# Patient Record
Sex: MALE | Race: WHITE | NOT HISPANIC OR LATINO | Employment: FULL TIME | URBAN - METROPOLITAN AREA
[De-identification: names, ages, dates, MRNs, and addresses within clinical notes are randomized per-mention and may not be internally consistent; named-entity substitution may affect disease eponyms.]

---

## 2019-06-18 ENCOUNTER — NURSE TRIAGE (OUTPATIENT)
Dept: FAMILY MEDICINE | Facility: CLINIC | Age: 49
End: 2019-06-18

## 2019-06-18 NOTE — TELEPHONE ENCOUNTER
"Patient is scheduled to see RITO Johns PA-C, on 6/20/19 for \"chest pains.\"    Triage asked to contact patient regarding symptoms.    Patient's last office visit: 3/23/15.    Left message to call back and ask to speak with an available triage nurse.  LENNOX BernalN, RN            "

## 2019-07-05 NOTE — PROGRESS NOTES
"Subjective     Fito Frye is a 48 year old male who presents to clinic today for the following health issues:    HPI   Chest Pain      Onset: x 2 months    Description (location/character/radiation/duration): right upper chest, intermittent pains last 1 minute, squeezing feeling    Accompanying signs and symptoms:        Shortness of breath: YES - with sense of fatigue but SOB does not occur with CP episodes       Sweating: no        Nausea/vomitting: no        Palpitations: no        Other (fevers/chills/cough/heartburn/lightheadedness): YES- fatigue    History (similar episodes/previous evaluation): Patient states he had a similar pain years ago related to overuse of right arm at work (no longer has this job) so he thinks it could be muscular    Precipitating or alleviating factors:       Worse with exertion: no        Worse with breathing: no        Related to eating: no        Better with burping: no     Therapies tried and outcome: None      He describes this as a \"gripping\" pain   Often occurs at rest - often when lying in bed  Occurs many times per day - up to a dozen times  He is very active; he exercises a lot and 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  He swims across [facility].  Use of his upper extremities does not exacerbate his pain.  No neck pain.  Occasional blurry vision.    Denies dizziness but states he feels very tired - almost to the point of dizzines  No syncope.  He notes a lot of stress at home.  His 15-year-old son has been having behavior issues.  Answers for HPI/ROS submitted by the patient on 7/11/2019   If you checked off any problems, how difficult have these problems made it for you to do your work, take care of things at home, or get along with other people?: Somewhat difficult  PHQ9 TOTAL SCORE: 4  HORTENSIA 7 TOTAL SCORE: 0    He notes that his blood pressure has been slightly elevated the last few times he's had it taken.    He had " normal stress echo in 2007.    CV Risks:   + FHX, male gender, elevated BP  Unknown lipid status  Nonsmoker (never smoker)    BP Readings from Last 3 Encounters:   07/11/19 (!) 141/96   04/24/15 134/87   03/27/15 137/81    Wt Readings from Last 3 Encounters:   07/11/19 100.7 kg (222 lb)   04/24/15 95.8 kg (211 lb 3.2 oz)   03/27/15 93.2 kg (205 lb 6.4 oz)               Reviewed and updated as needed this visit by Provider  Meds  Problems         Review of Systems   ROS COMP: Constitutional, HEENT, cardiovascular, pulmonary, GI, , musculoskeletal, neuro, skin, endocrine and psych systems are negative, except as otherwise noted.      Objective    BP (!) 141/96 (BP Location: Right arm, Patient Position: Sitting, Cuff Size: Adult Large)   Pulse 51   Temp 98.3  F (36.8  C) (Oral)   Resp 12   Wt 100.7 kg (222 lb)   SpO2 99%   BMI 26.85 kg/m    Body mass index is 26.85 kg/m .  Physical Exam   GEN:  no apparent distress   EYES: sclerae and conjunctivae clear with no discharge  NECK:  Supple without adenopathy, mass, or thyromegaly  LUNGS:  normal respiratory effort, and lungs clear to auscultation bilaterally - no rales, rhonchi or wheezes  CV: regular rate and rhythm, normal S1 S2, no S3 or S4 and no murmur, click or rub  ABD:  soft, nontender, no mass, no hepatosplenomegaly, no hernias   SHOULDER:  ROM of right shoulder is full and painless.        Diagnostic Test Results:  EKG: appears normal, NSR with normal axis, incomplete RBBB, LVH, no acute ST/T changes c/w ischemia by my interpretation     Results for orders placed or performed in visit on 07/11/19 (from the past 24 hour(s))   CBC with platelets differential   Result Value Ref Range    WBC 4.1 4.0 - 11.0 10e9/L    RBC Count 5.10 4.4 - 5.9 10e12/L    Hemoglobin 15.4 13.3 - 17.7 g/dL    Hematocrit 46.4 40.0 - 53.0 %    MCV 91 78 - 100 fl    MCH 30.2 26.5 - 33.0 pg    MCHC 33.2 31.5 - 36.5 g/dL    RDW 13.0 10.0 - 15.0 %    Platelet Count 250 150 - 450 10e9/L     % Neutrophils 48.8 %    % Lymphocytes 30.5 %    % Monocytes 11.7 %    % Eosinophils 8.3 %    % Basophils 0.7 %    Absolute Neutrophil 2.0 1.6 - 8.3 10e9/L    Absolute Lymphocytes 1.3 0.8 - 5.3 10e9/L    Absolute Monocytes 0.5 0.0 - 1.3 10e9/L    Absolute Eosinophils 0.3 0.0 - 0.7 10e9/L    Absolute Basophils 0.0 0.0 - 0.2 10e9/L    Diff Method Automated Method            Assessment & Plan     1. Fatigue, unspecified type  2. Right-sided chest pain  Unclear etiology/diagnosis with uncertain prognosis requiring further workup.  Ddx considered includes: coronary disease, valve disease (aortic stenosis or insufficiency), cardiomyopathy, pericarditis, pulmonary hypertension, aortic dissection, PE, pneumonia, pleuritis, ptx, lung tumor, GERD, esophageal spasm or rupture, PUD, Inna-Mendez tear, pancreatitis, biliary disease, costochondritis, thoracic radiculopathy, zoster, and anxiety. Also considered anemia, sleep disorder, mood disorder, endocrine disease (thyroid, diabetes), liver disease, kidney disease.   His chest pain is very atypical in terms of location, nonexertional pattern, and brief duration.  MSK etiology does seem somewhat likely given the extent of his upper extremity use (lifting carts at work, swimming, etc) although he reports no shoulder pain. Stress/anxiety may also be contributing.  His family history of CV disease is worrisome - I recommended lipid testing today for further risk stratification and he is fasting.    - Lipid panel reflex to direct LDL Fasting  - Comprehensive metabolic panel  - CBC with platelets differential  - TSH with free T4 reflex  - EKG 12-lead complete w/read - Clinics        3. Elevated blood pressure reading without diagnosis of hypertension  Given his report of elevated BP readings outside of clinic and the LVH on EKG he likely has HTN.  Discussed relevant anatomy, pathophysiology, and sequelae of this condition.  Discussed the importance of controlling blood pressure to  prevent chronic damage to arteries and subsequent arterial disease including risk of heart attack and stroke.  Gave patient education handout on hypertension and dietary recommendations for BP control.  He does not want to start medication at this time; rather he'd prefer to work on diet.     I also recommended formal eye exam.      Patient Instructions     Let us know if your chest pains worsen or become associated with exertion or other symptoms (like shortness of breath, dizziness, sweats, nausea).    For any chest pain that lasts more than 5-10 minutes go to the ER.        Return in about 2 months (around 9/11/2019) for follow-up elevated blood pressure and chest pain.    Mona Ervin MD  [facility]

## 2019-07-11 ENCOUNTER — OFFICE VISIT (OUTPATIENT)
Dept: FAMILY MEDICINE | Facility: CLINIC | Age: 49
End: 2019-07-11

## 2019-07-11 VITALS
DIASTOLIC BLOOD PRESSURE: 96 MMHG | HEART RATE: 51 BPM | WEIGHT: 222 LBS | SYSTOLIC BLOOD PRESSURE: 141 MMHG | OXYGEN SATURATION: 99 % | BODY MASS INDEX: 26.85 KG/M2 | RESPIRATION RATE: 12 BRPM | TEMPERATURE: 98.3 F

## 2019-07-11 DIAGNOSIS — R03.0 ELEVATED BLOOD PRESSURE READING WITHOUT DIAGNOSIS OF HYPERTENSION: ICD-10-CM

## 2019-07-11 DIAGNOSIS — R53.83 FATIGUE, UNSPECIFIED TYPE: Primary | ICD-10-CM

## 2019-07-11 DIAGNOSIS — R07.9 RIGHT-SIDED CHEST PAIN: ICD-10-CM

## 2019-07-11 LAB
ALBUMIN SERPL-MCNC: 4 G/DL (ref 3.4–5)
ALP SERPL-CCNC: 75 U/L (ref 40–150)
ALT SERPL W P-5'-P-CCNC: 27 U/L (ref 0–70)
ANION GAP SERPL CALCULATED.3IONS-SCNC: 5 MMOL/L (ref 3–14)
AST SERPL W P-5'-P-CCNC: 16 U/L (ref 0–45)
BASOPHILS # BLD AUTO: 0 10E9/L (ref 0–0.2)
BASOPHILS NFR BLD AUTO: 0.7 %
BILIRUB SERPL-MCNC: 0.8 MG/DL (ref 0.2–1.3)
BUN SERPL-MCNC: 17 MG/DL (ref 7–30)
CALCIUM SERPL-MCNC: 8.9 MG/DL (ref 8.5–10.1)
CHLORIDE SERPL-SCNC: 107 MMOL/L (ref 94–109)
CHOLEST SERPL-MCNC: 209 MG/DL
CO2 SERPL-SCNC: 28 MMOL/L (ref 20–32)
CREAT SERPL-MCNC: 0.89 MG/DL (ref 0.66–1.25)
DIFFERENTIAL METHOD BLD: NORMAL
EOSINOPHIL # BLD AUTO: 0.3 10E9/L (ref 0–0.7)
EOSINOPHIL NFR BLD AUTO: 8.3 %
ERYTHROCYTE [DISTWIDTH] IN BLOOD BY AUTOMATED COUNT: 13 % (ref 10–15)
GFR SERPL CREATININE-BSD FRML MDRD: >90 ML/MIN/{1.73_M2}
GLUCOSE SERPL-MCNC: 90 MG/DL (ref 70–99)
HCT VFR BLD AUTO: 46.4 % (ref 40–53)
HDLC SERPL-MCNC: 64 MG/DL
HGB BLD-MCNC: 15.4 G/DL (ref 13.3–17.7)
LDLC SERPL CALC-MCNC: 128 MG/DL
LYMPHOCYTES # BLD AUTO: 1.3 10E9/L (ref 0.8–5.3)
LYMPHOCYTES NFR BLD AUTO: 30.5 %
MCH RBC QN AUTO: 30.2 PG (ref 26.5–33)
MCHC RBC AUTO-ENTMCNC: 33.2 G/DL (ref 31.5–36.5)
MCV RBC AUTO: 91 FL (ref 78–100)
MONOCYTES # BLD AUTO: 0.5 10E9/L (ref 0–1.3)
MONOCYTES NFR BLD AUTO: 11.7 %
NEUTROPHILS # BLD AUTO: 2 10E9/L (ref 1.6–8.3)
NEUTROPHILS NFR BLD AUTO: 48.8 %
NONHDLC SERPL-MCNC: 145 MG/DL
PLATELET # BLD AUTO: 250 10E9/L (ref 150–450)
POTASSIUM SERPL-SCNC: 4.9 MMOL/L (ref 3.4–5.3)
PROT SERPL-MCNC: 7.3 G/DL (ref 6.8–8.8)
RBC # BLD AUTO: 5.1 10E12/L (ref 4.4–5.9)
SODIUM SERPL-SCNC: 140 MMOL/L (ref 133–144)
TRIGL SERPL-MCNC: 97 MG/DL
TSH SERPL DL<=0.005 MIU/L-ACNC: 1.9 MU/L (ref 0.4–4)
WBC # BLD AUTO: 4.1 10E9/L (ref 4–11)

## 2019-07-11 PROCEDURE — 93000 ELECTROCARDIOGRAM COMPLETE: CPT | Performed by: FAMILY MEDICINE

## 2019-07-11 PROCEDURE — 80053 COMPREHEN METABOLIC PANEL: CPT | Performed by: FAMILY MEDICINE

## 2019-07-11 PROCEDURE — 99214 OFFICE O/P EST MOD 30 MIN: CPT | Performed by: FAMILY MEDICINE

## 2019-07-11 PROCEDURE — 85025 COMPLETE CBC W/AUTO DIFF WBC: CPT | Performed by: FAMILY MEDICINE

## 2019-07-11 PROCEDURE — 84443 ASSAY THYROID STIM HORMONE: CPT | Performed by: FAMILY MEDICINE

## 2019-07-11 PROCEDURE — 80061 LIPID PANEL: CPT | Performed by: FAMILY MEDICINE

## 2019-07-11 PROCEDURE — 36415 COLL VENOUS BLD VENIPUNCTURE: CPT | Performed by: FAMILY MEDICINE

## 2019-07-11 ASSESSMENT — ANXIETY QUESTIONNAIRES
5. BEING SO RESTLESS THAT IT IS HARD TO SIT STILL: NOT AT ALL
7. FEELING AFRAID AS IF SOMETHING AWFUL MIGHT HAPPEN: NOT AT ALL
6. BECOMING EASILY ANNOYED OR IRRITABLE: NOT AT ALL
GAD7 TOTAL SCORE: 0
4. TROUBLE RELAXING: NOT AT ALL
GAD7 TOTAL SCORE: 0
1. FEELING NERVOUS, ANXIOUS, OR ON EDGE: NOT AT ALL
7. FEELING AFRAID AS IF SOMETHING AWFUL MIGHT HAPPEN: NOT AT ALL
3. WORRYING TOO MUCH ABOUT DIFFERENT THINGS: NOT AT ALL
2. NOT BEING ABLE TO STOP OR CONTROL WORRYING: NOT AT ALL
GAD7 TOTAL SCORE: 0

## 2019-07-11 ASSESSMENT — PATIENT HEALTH QUESTIONNAIRE - PHQ9
SUM OF ALL RESPONSES TO PHQ QUESTIONS 1-9: 4
SUM OF ALL RESPONSES TO PHQ QUESTIONS 1-9: 4
10. IF YOU CHECKED OFF ANY PROBLEMS, HOW DIFFICULT HAVE THESE PROBLEMS MADE IT FOR YOU TO DO YOUR WORK, TAKE CARE OF THINGS AT HOME, OR GET ALONG WITH OTHER PEOPLE: SOMEWHAT DIFFICULT

## 2019-07-11 NOTE — PATIENT INSTRUCTIONS
Let us know if your chest pains worsen or become associated with exertion or other symptoms (like shortness of breath, dizziness, sweats, nausea).    For any chest pain that lasts more than 5-10 minutes go to the ER.      Patient Education     What is High Blood Pressure?  High blood pressure (also called hypertension) is known as the  silent killer.  This is because most of the time it doesn t cause symptoms. In fact, many people don t know they have it until other problems develop. In most cases, high blood pressure often requires lifelong treatment.  Understanding blood pressure  The circulatory system is made up of the heart and blood vessels that carry blood through the body. Your heart is the pump for this system. With each heartbeat (contraction), the heart sends blood out through large blood vessels called arteries. Blood pressure is a measure of how hard the moving blood pushes against the walls of the arteries.  High blood pressure can harm your health  In a healthy blood vessel, the blood moves smoothly through the vessel and puts normal pressure on the vessel walls.       High blood pressure occurs when blood pushes too hard against artery walls. This causes damage to the artery walls and then the formation of scar tissue as it heals. This makes the arteries stiff and weak. Plaque sticks to the scarred tissue narrowing and hardening the arteries. High blood pressure also causes your heart to work harder to get blood out to the body. High blood pressure raises your risk of heart attack, also known as acute myocardial infarction, or AMI, heart failure, and stroke. It can also lead to kidney disease, and blindness. In general, if you have high blood pressure, keeping your blood pressure below 130/80 mmHg may help prevent these problems. Your healthcare provider may prescribe medicine to help control blood pressure if lifestyle changes are not enough.  It's important to know your blood pressure numbers.  Blood pressure measurements are given as 2 numbers. Systolic blood pressure is the upper number. This is the pressure when the heart contracts. Diastolic blood pressure is the lower number. This is the pressure when the heart relaxes between beats.  Blood pressure is categorized as normal, elevated, or stage 1 or stage 2 high blood pressure:    Normal blood pressure is systolic of less than 120 and diastolic of less than 80 (120/80)    Elevated blood pressure is systolic of 120 to 129 and diastolic less than 80    Stage 1 high blood pressure is systolic is 130 to 139 or diastolic between 80 to 89    Stage 2 high blood pressure is when systolic is 140 or higher or the diastolic is 90 or higher  High blood pressure is diagnosed when multiple, separate readings show blood pressure above 130/80 mmHg. Talk with your healthcare provider if you have questions or concerns about your blood pressure readings.  Measuring blood pressure  An example of a blood pressure measurement is 120/70 (120 over 70). The top number is the pressure of blood against the artery walls during a heartbeat (systolic). The bottom number is the pressure of blood against artery walls between heartbeats (diastolic). Talk with your healthcare provider to find out what your blood pressure goals should be.   Controlling blood pressure  If your blood pressure is too high, work with your doctor on a plan for lowering it. Below are steps you can take that will help lower your blood pressure.    Choose heart-healthy foods. Eating healthier meals helps you control your blood pressure. Ask your doctor about the DASH eating plan. This plan helps reduce blood pressure by limiting the amount of sodium (salt) you have in your diet. DASH also encourages eating plenty of fruits and vegetables, low-fat or non-fat dairy, whole-grains, and foods high in fiber, and low in fat. This also provides an enhanced amount of potassium which can also help lower blood  "pressure.    Reduce sodium. Reducing sodium in your diet reduces fluid retention. Fluid retention caused by too much salt increases blood volume and blood pressure. The American Heart Association s (AHA) \"ideal\" sodium intake recommendation is 1,500 milligrams per day.  However, since American's eat so much salt, the AHA says a positive change can occur by cutting back to even 2,400 milligrams of sodium a day.    Maintain a healthy weight. Being overweight makes you more likely to have high blood pressure. Losing excess weight helps lower blood pressure.    Exercise regularly. Daily exercise helps your heart and blood vessels work better and stay healthier. It can help lower your blood pressure.    Stop smoking. Smoking increases blood pressure and damages blood vessels.    Limit alcohol. Drinking too much alcohol can raise blood pressure. Men should have no more than 2 drinks a day. Women should have no more than 1. (A drink is equal to 1 beer, or a small glass of wine, or a shot of liquor.)    Control stress. Stress makes your heart work harder and beat faster. Controlling stress helps you control your blood pressure.  Facts about high blood pressure    Feeling OK does not mean that blood pressure is under control. Likewise, feeling bad doesn t mean it s out of control. The only way to know for sure is to check your pressure regularly.    Medicine is only one part of controlling high blood pressure. You also need to manage your weight, get regular exercise, and adjust your eating habits.    High blood pressure is usually a lifelong problem. But it can be controlled with healthy lifestyle changes and medicine.    Hypertension is not the same as stress. Although stress may be a factor in high blood pressure, it s only one part of the story.    Blood pressure medicines need to be taken every day. Stopping suddenly may cause a dangerous increase in pressure.   Date Last Reviewed: 4/27/2016 2000-2018 [employer] " [employer]. [address] [place] [id number]. All rights reserved. This information is not intended as a substitute for professional medical care. Always follow your healthcare professional's instructions.           Patient Education     Eating Heart-Healthy Food: Using the DASH Plan    Eating for your heart doesn t have to be hard or boring. You just need to know how to make healthier choices. The DASH eating plan has been developed to help you do just that. DASH stands for Dietary Approaches to Stop Hypertension. It is a plan that has been proven to be healthier for your heart and to lower your risk for high blood pressure. It can also help lower your risk for cancer, heart disease, osteoporosis, and diabetes.  Choosing from each food group  Choose foods from each of the food groups below each day. Try to get the recommended number of servings for each food group. The serving numbers are based on a diet of 2,000 calories a day. Talk to your doctor if you re unsure about your calorie needs. Along with getting the correct servings, the DASH plan also recommends a sodium intake less than 2,300 mg per day.        Grains  Servings: 6 to 8 a day  A serving is:    1 slice bread    1 ounce dry cereal    Half a cup cooked rice, pasta or cereal  Best choices: Whole grains and any grains high in fiber. Vegetables  Servings: 4 to 5 a day  A serving is:    1 cup raw leafy vegetable    Half a cup cut-up raw or cooked vegetable    Half a cup vegetable juice  Best choices: Fresh or frozen vegetables prepared without added salt or fat.   Fruits  Servings: 4 to 5 a day  A serving is:    1 medium fruit    One-quarter cup dried fruit    Half a cup fresh, frozen, or canned fruit    Half a cup of 100% fruit juices  Best choices: A variety of fresh fruits of different colors. Whole fruits are a better choice than fruit juices. Low-fat or fat-free dairy  Servings: 2 to 3 a day  A serving is:    1 cup milk    1 cup  yogurt    One and a half ounces cheese  Best choices: Skim or 1% milk, low-fat or fat-free yogurt or buttermilk, and low-fat cheeses.         Lean meats, poultry, fish  Servings: 6 or fewer a day  A serving is:    1 ounce cooked meats, poultry, or fish    1 egg  Best choices: Lean poultry and fish. Trim away visible fat. Broil, grill, roast, or boil instead of frying. Remove skin from poultry before eating. Limit how much red meat you eat.  Nuts, seeds, beans  Servings: 4 to 5 a week  A serving is:    One-third cup nuts (one and a half ounces)    2 tablespoons nut butter or seeds    Half a cup cooked dry beans or legumes  Best choices: Dry roasted nuts with no salt added, lentils, kidney beans, garbanzo beans, and whole spann beans.   Fats and oils  Servings: 2 to 3 a day  A serving is:    1 teaspoon vegetable oil    1 teaspoon soft margarine    1 tablespoon mayonnaise    2 tablespoons salad dressing  Best choices: Nut and vegetable oils (nontropical vegetable oils), such as olive and canola oil. Sweets  Servings: 5 a week or fewer  A serving is:    1 tablespoon sugar, maple syrup, or honey    1 tablespoon jam or jelly    1 half-ounce jelly beans (about 15)    1 cup lemonade  Best choices: Dried fruit can be a satisfying sweet. Choose low-fat sweets. And watch your serving sizes!      For more on the DASH eating plan, visit:  www.nhlbi.nih.gov/health/health-topics/topics/dash   Date Last Reviewed: 6/1/2016 2000-2018 [employer]. [address] [place] [address], [place] [address]. All rights reserved. This information is not intended as a substitute for professional medical care. Always follow your healthcare professional's instructions.

## 2019-07-12 ASSESSMENT — PATIENT HEALTH QUESTIONNAIRE - PHQ9: SUM OF ALL RESPONSES TO PHQ QUESTIONS 1-9: 4

## 2019-07-12 ASSESSMENT — ANXIETY QUESTIONNAIRES: GAD7 TOTAL SCORE: 0

## 2019-07-12 NOTE — RESULT ENCOUNTER NOTE
Luis Manuel Payton,  Your lipid panel (cholesterol) results show mild elevation of your total cholesterol and LDL (bad) cholesterol.  Your complete blood counts, TSH (thyroid function test) and comprehensive metabolic panel results (liver function, kidney function, blood sugar, and blood salts) are all normal.      As you may know, an elevated cholesterol is one factor that increases your risk for heart disease and stroke.  You can improve your cholesterol by controlling the amount and type of fat you eat.    Here are some ways to improve your nutrition:      *   Eat less fat (such as butter) and no trans fats (such as margarine and Crisco).  Use polyunsaturated fats (such as olive oil) instead.      *   Buy lean cuts of meat and replace red meat with fish and seafood.      *   Eat more fruits and vegetables.      *   Eat a handful of tree nuts (such as almonds, walnuts, or cashews) every day.       *   Avoid fried or fast foods that are high in fat      *   Replace processed starches with whole grains (such as brown rice or whole grain breads, pastas, cereals) and legumes.    Please schedule a follow-up in 2 months and let us know sooner if your fatigue or chest pain episodes are worsening.    Mona Ervin MD

## 2020-03-30 ENCOUNTER — TELEPHONE (OUTPATIENT)
Dept: FAMILY MEDICINE | Facility: CLINIC | Age: 50
End: 2020-03-30

## 2020-03-30 ENCOUNTER — VIRTUAL VISIT (OUTPATIENT)
Dept: FAMILY MEDICINE | Facility: CLINIC | Age: 50
End: 2020-03-30

## 2020-03-30 DIAGNOSIS — R51.9 NONINTRACTABLE EPISODIC HEADACHE, UNSPECIFIED HEADACHE TYPE: Primary | ICD-10-CM

## 2020-03-30 PROCEDURE — 99213 OFFICE O/P EST LOW 20 MIN: CPT | Mod: TEL | Performed by: FAMILY MEDICINE

## 2020-03-30 NOTE — TELEPHONE ENCOUNTER
Triage -- please call pt to find out if he is having an acute severe headache or acute vision changes. If so, he needs to go directly do the ER and should not wait for a telephone visit with me at 2pm. I see that his last bp was elevated. It is possible that he is having hypertensive urgency or stroke. He is not on any anti-hypertensive medication.I also see that he had headaches with photophobia and dizziness/lightheadedness in 2013 and was sent for an MRI of the brain, which was normal at that time. I am listed as his PCP, but have not seen him since 2015. Mona Szymanski M.D.

## 2020-03-30 NOTE — TELEPHONE ENCOUNTER
Patient thinks that it may be sinus issues.  Has HA in the back of the head.  States that vision is not really changing.  Not having any issues with talking or movements.  Patient feels he is OK to wait for phone visit.  Shagufta had called to make appointment and patient is at work.  Not together when appointment was made.  Maria Rg RN

## 2020-03-30 NOTE — PROGRESS NOTES
"Subjective     Fito Frye is a 49 year old male who is being evaluated via a billable telephone visit.      The patient has been notified of following:     \"This telephone visit will be conducted via a call between you and your physician/provider. We have found that certain health care needs can be provided without the need for a physical exam.  This service lets us provide the care you need with a short phone conversation.  If a prescription is necessary we can send it directly to your pharmacy.  If lab work is needed we can place an order for that and you can then stop by our lab to have the test done at a later time.    If during the course of the call the physician/provider feels a telephone visit is not appropriate, you will not be charged for this service.\"     Physician has received verbal consent for a Telephone Visit from the patient? Yes    Fito Frye complains of   Chief Complaint   Patient presents with     Headache     Derm Problem           ALLERGIES  No known drug allergies and Seasonal allergies    Headache  Onset: 4 days    Description:   Location: left back side of head   Character: pressure feeling, sharp pain  Frequency: Every day for the past 4 days   Duration:  Off and on throughout the day and when looking up     Intensity: moderate    Progression of Symptoms:  same    Accompanying Signs & Symptoms:  Stiff neck: no  Neck or upper back pain: YES  Fever: no  Sinus pressure: YES  Nausea or vomiting: no  Dizziness: no  Numbness: no  Weakness: no  Visual changes: YES see below    History:   Head trauma: no  Family history of migraines: no  Previous tests for headaches: no  Neurologist evaluations: no  Able to do daily activities: YES  Wake with a headaches: YES notices upon awakening  Do headaches wake you up: no  Daily pain medication use: YES  Work/school stressors/changes: no    Precipitating factors:   Does light make it worse: sometimes  Does sound make it worse: " "no    Alleviating factors:  Does sleep help: no    Therapies Tried and outcome: Ibuprofen (Advil, Motrin) and Tylenol- does not help     He has had a headache for 4 days and wonders if it sounds worrisome.  He was sore on Friday, in general, but also his head.   Saturday woke up with headache at the back of his head.   Sunday same thing and throughout the day  Feels the pain on the skull itself on one particular spot.   Hurts more when he moves, moving his head up or down gets a sharp shooting pain.   He does not have any neck pain really.   For awhile felt like a lot of pressure. Pretty much stayed the same. Dulls a little after he takes OTC pain medication. Does not go away.   Has not had a HA like this before.   Thinks he might have two things. Face also some pressure yesterday. Has had sinus headaches in the past. Wonders if this could be a variation of the \"sinus headache' he sometimes gets.   He has a little bit of a tender spot in the fleshy part of the back of his head. No swelling.   He was not doing anything in particular when it started. Noticed it upon awakening. It does not awaken him during sleep.   If he stays motionless, has a sensation of pressure but no pain.   If he turns his head to the right or up or down has shooting pain.   Regarding his vision, he says that it has been bothering him for a bit and does not really seem related. Not blurry. Maybe a little light sensitiviuty. Has had worse when he has had allergy headaches in the past.   No fever or cough. Otherwise feeling well.        He was last seen in July by Dr. Ervin for the complaints of fatigue and chest pain.  He did have an elevated blood pressure at that time.  He had a normal EKG and his labs, aside from mild elevation of total cholesterol and LDL, were normal.  He had declined medication at that time for his blood pressure.    He is very active, exercising regularly and has a physical job.  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  He swims across [facility].  At that time, he did also complain of occasional blurry vision.  He also complained of a sensation of feeling very tired, almost to the point of dizziness.  He had reported that he had a lot of stress at home with a 15-year-old son having behavioral issues.  He was recommended to have a formal eye exam given his complaint of blurry vision.  It was also recommended that he follow-up in September for his elevated blood pressure and complaint of chest pain. He has not been seen in clinic since July.        Patient Active Problem List   Diagnosis     CARDIOVASCULAR SCREENING; LDL GOAL LESS THAN 160     Porokeratosis plantaris discreta     Pain in joint involving ankle and foot     Past Surgical History:   Procedure Laterality Date     [facility] ECHO HEART XTHORACIC, STRESS/REST  7/2007    Negative       Social History     Tobacco Use     Smoking status: Never Smoker     Smokeless tobacco: Never Used   Substance Use Topics     Alcohol use: Yes     Comment: 6 drinks weekly     Family History   Problem Relation Age of Onset     Myocardial Infarction Mother 70     Coronary Artery Disease Father      Diabetes Type 2  Father          Current Outpatient Medications   Medication Sig Dispense Refill     IBUPROFEN PO Take 200 mg by mouth       Psyllium (METAMUCIL PO) Take 17 g by mouth daily       VITAMIN D OR 1 tablet daily       Allergies   Allergen Reactions     No Known Drug Allergies      Seasonal Allergies      Recent Labs   Lab Test 07/11/19  1005 04/14/14  1418 04/25/13  1756   *  --   --    HDL 64  --   --    TRIG 97  --   --    ALT 27 22 31   CR 0.89 0.78 0.82   GFRESTIMATED >90 >90 >90   GFRESTBLACK >90 >90 >90   POTASSIUM 4.9 3.6 4.2   TSH 1.90  --  2.84      BP Readings from Last 3 Encounters:   07/11/19 (!) 141/96   04/24/15 134/87   03/27/15 137/81    Wt Readings from Last 3 Encounters:   07/11/19 100.7 kg (222 lb)   04/24/15 95.8 kg (211 lb 3.2  oz)   03/27/15 93.2 kg (205 lb 6.4 oz)                    Reviewed and updated as needed this visit by Provider         Review of Systems   ROS COMP: Constitutional, HEENT, cardiovascular, pulmonary, gi and gu systems are negative, except as otherwise noted.       Objective   Reported vitals:  There were no vitals taken for this visit.      Psych: Alert and oriented times 3; coherent speech, normal   rate and volume, able to articulate logical thoughts, able   to abstract reason, no tangential thoughts, no hallucinations   or delusions      Diagnostic Test Results:  Labs reviewed in Epic         Assessment/Plan:  1. Nonintractable episodic headache, unspecified headache type  Likely tension type headache.  No red flag symptoms. I recommended applying hot or cold packs to the area depending on what she feels better.  I recommended stretching the neck and massage of the area with a foam roller.  He can continue to use Tylenol and/or ibuprofen as needed for discomfort.  For now, will monitor symptoms.  If any additional symptoms arise or if the headache becomes more severe he will let us know right away.  He prefers to avoid any clinic visits at this time.          Phone call duration:  18 minutes    Mona Szymanski MD

## 2020-07-14 ENCOUNTER — VIRTUAL VISIT (OUTPATIENT)
Dept: FAMILY MEDICINE | Facility: CLINIC | Age: 50
End: 2020-07-14

## 2020-07-14 DIAGNOSIS — R53.83 FATIGUE, UNSPECIFIED TYPE: ICD-10-CM

## 2020-07-14 DIAGNOSIS — M79.10 MUSCLE ACHE: ICD-10-CM

## 2020-07-14 DIAGNOSIS — R51.9 NONINTRACTABLE EPISODIC HEADACHE, UNSPECIFIED HEADACHE TYPE: Primary | ICD-10-CM

## 2020-07-14 DIAGNOSIS — R40.0 DAYTIME SOMNOLENCE: ICD-10-CM

## 2020-07-14 PROCEDURE — 99214 OFFICE O/P EST MOD 30 MIN: CPT | Mod: 95 | Performed by: FAMILY MEDICINE

## 2020-07-14 RX ORDER — SUMATRIPTAN 25 MG/1
25 TABLET, FILM COATED ORAL
COMMUNITY
End: 2020-07-30

## 2020-07-14 RX ORDER — METHYLPREDNISOLONE 4 MG/1
4 TABLET ORAL DAILY
COMMUNITY
End: 2020-07-30

## 2020-07-14 NOTE — PATIENT INSTRUCTIONS
"  Patient Education     Self-Care for Headaches    Most headaches aren't serious and can be relieved with self-care. But some headaches may be a sign of another health problem like eye trouble or high blood pressure. To find the best treatment, learn what kind of headaches you get. For tension headaches, self-care will usually help. To treat migraines, ask your healthcare provider for advice. It is also possible to get both tension and migraine headaches. Self-care involves relieving the pain and avoiding headache  triggers  if you can.  Ways to reduce pain and tension  Try these steps:    Apply a cold compress or ice pack to the pain site.    Drink fluids. If nausea makes it hard to drink, try sucking on ice.    Rest. Protect yourself from bright light and loud noises.    Calm your emotions by imagining a peaceful scene.    Massage tight neck, shoulder, and head muscles.    To relax muscles, soak in a hot bath or use a hot shower.  Use medicines  Aspirin or other over-the-counter pain medicines, such as ibuprofen and acetaminophen, can relieve headache. Remember: Never give aspirin to anyone 18 years old or younger because of the risk of developing Reye syndrome. Use pain medicines only when needed. Certain prescription medicines, if taken too often, can lead to rebound headaches. Check with your healthcare provider or pharmacist about your medicines.  Track your headaches  Keeping a headache diary can help you and your healthcare provider identify what's causing your headaches:    Note when each headache happens.    Identify your activities and the foods you've eaten 6 to 8 hours before the headache began.    Look for any trends or \"triggers.\"  Signs of tension headache  Any of the following can be signs:    Dull pain or feeling of pressure in a tight band around your head    Pain in your neck or shoulders    Headache without a definite beginning or end    Headache after an activity such as driving or working on a " "computer  Signs of migraine  Any of the following can be signs:    Throbbing pain on one or both sides of your head    Nausea or vomiting    Extreme sensitivity to light, sound, and smells    Bright spots, flashes, or other visual changes    Pain or nausea so severe that you can't continue your daily activities  Call your healthcare provider   If you have any of the following symptoms, contact your healthcare provider:    A headache that lingers after a recent injury or bump to the head.    A fever with a stiff neck or pain when you bend your head toward your chest.    A headache along with slurred speech, changes in your vision, or numbness or weakness in your arms or legs.    A headache for longer than 3 days.    Frequent headaches, especially in the morning.    Headaches with seizures     Seek immediate medical attention if you have a headache that you would call \"the worst headache you have ever had.\"  Date Last Reviewed: 3/1/2018    4405-5889 [employer]. [address] [place] [address], [place] [address]. All rights reserved. This information is not intended as a substitute for professional medical care. Always follow your healthcare professional's instructions.           "

## 2020-07-14 NOTE — PROGRESS NOTES
"Fito Frye is a 49 year old male who is being evaluated via a billable telephone visit.      The patient has been notified of following:     \"This telephone visit will be conducted via a call between you and your physician/provider. We have found that certain health care needs can be provided without the need for a physical exam.  This service lets us provide the care you need with a short phone conversation.  If a prescription is necessary we can send it directly to your pharmacy.  If lab work is needed we can place an order for that and you can then stop by our lab to have the test done at a later time.    Telephone visits are billed at different rates depending on your insurance coverage. During this emergency period, for some insurers they may be billed the same as an in-person visit.  Please reach out to your insurance provider with any questions.    If during the course of the call the physician/provider feels a telephone visit is not appropriate, you will not be charged for this service.\"    Patient has given verbal consent for Telephone visit?  Yes    What phone number would you like to be contacted at? [phone]    How would you like to obtain your AVS? [facility]    Subjective     Fito Frye is a 49 year old male who presents via phone visit today for the following health issues:    HPI     He continues to have headaches, though they have become less frequent.  The symptoms are about the same he [facility], but did not feel they were effective.     He says that in March he was having headaches and fatigue. He worried he had a brain tumor or COVID. He started trying to do massage and taking ibuprofen and it got a little better. The pain got better, but did not go away. [facility]. Was having headaches, fatigue, muscle aches. He was given migraine medicine and it helped a little bit, but not a big difference. He has had a sense of soreness " "and fatigue. Headache pain is still present. Feels sore and exhausted and wanted to get a COVID test. Has never had a fever or a cough. Sometimes feels dizzy, exhausted, muscle soreness. Takes ibuprofen and tylenol for symptoms.     He was in clinic last year with fatigue too. With a sense of feeling very tired, almost to the point of dizziness.     He stopped biking this fall because he was just too tired. Denies any shortness of breath.     His sleep is not very good. His wife has complained for years that he is restless through the night. He feels sore and fatigued when he wakes up in the morning. Has coffee and moves around and feels better, then in the middle of the day feels achy and tired again. Does not feel rested when he awakens. Does not really have difficulty falling asleep. Does feel sore and takes tylenol pm to help sleep.     Sometimes has some gas and mild stomach pain. Regular bowel movements. Eats well. Eats a lot of veggies except at work.     Muscle aches are all over his body. No areas of swelling or erythema. On and off through the day. Comes in waves. Does have joint pains sometimes, but that does not seem out of the ordinary to him.     Does note he has had periods of depression in the past. Never felt he needed to take medication. Stress at home with son who is 16 and had some behavioral trouble and legal issues. Does not feel depressed.     He notices he feels colder more often. Sometimes will feel suddenly hot.     Sometimes notices shaky hands, loses his  sometimes. Sometimes feels difficulty balancing.         From 3/30/20 virtual visit:   \"Headache  Onset: 4 days    Description:   Location: left back side of head   Character: pressure feeling, sharp pain  Frequency: Every day for the past 4 days   Duration:  Off and on throughout the day and when looking up     Intensity: moderate    Progression of Symptoms:  same    Accompanying Signs & Symptoms:  Stiff neck: no  Neck or upper back " "pain: YES  Fever: no  Sinus pressure: YES  Nausea or vomiting: no  Dizziness: no  Numbness: no  Weakness: no  Visual changes: YES see below    History:   Head trauma: no  Family history of migraines: no  Previous tests for headaches: no  Neurologist evaluations: no  Able to do daily activities: YES  Wake with a headaches: YES notices upon awakening  Do headaches wake you up: no  Daily pain medication use: YES  Work/school stressors/changes: no    Precipitating factors:   Does light make it worse: sometimes  Does sound make it worse: no    Alleviating factors:  Does sleep help: no     Therapies Tried and outcome: Ibuprofen (Advil, Motrin) and Tylenol- does not help      He has had a headache for 4 days and wonders if it sounds worrisome.  He was sore on Friday, in general, but also his head.   Saturday woke up with headache at the back of his head.   Sunday same thing and throughout the day  Feels the pain on the skull itself on one particular spot.   Hurts more when he moves, moving his head up or down gets a sharp shooting pain.   He does not have any neck pain really.   For awhile felt like a lot of pressure. Pretty much stayed the same. Dulls a little after he takes OTC pain medication. Does not go away.   Has not had a HA like this before.   Thinks he might have two things. Face also some pressure yesterday. Has had sinus headaches in the past. Wonders if this could be a variation of the \"sinus headache' he sometimes gets.   He has a little bit of a tender spot in the fleshy part of the back of his head. No swelling.   He was not doing anything in particular when it started. Noticed it upon awakening. It does not awaken him during sleep.   If he stays motionless, has a sensation of pressure but no pain.   If he turns his head to the right or up or down has shooting pain.   Regarding his vision, he says that it has been bothering him for a bit and does not really seem related. Not blurry. Maybe a little light " "sensitiviuty. Has had worse when he has had allergy headaches in the past.   No fever or cough. Otherwise feeling well.         He was last seen in July by Dr. Ervin for the complaints of fatigue and chest pain.  He did have an elevated blood pressure at that time.  He had a normal EKG and his labs, aside from mild elevation of total cholesterol and LDL, were normal.  He had declined medication at that time for his blood pressure.     He is very active, exercising regularly and has a physical job.  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  He swims across [facility].  At that time, he did also complain of occasional blurry vision.  He also complained of a sensation of feeling very tired, almost to the point of dizziness.  He had reported that he had a lot of stress at home with a 15-year-old son having behavioral issues.  He was recommended to have a formal eye exam given his complaint of blurry vision.  It was also recommended that he follow-up in September for his elevated blood pressure and complaint of chest pain. He has not been seen in clinic since July.       \"    Patient Active Problem List   Diagnosis     CARDIOVASCULAR SCREENING; LDL GOAL LESS THAN 160     Porokeratosis plantaris discreta     Pain in joint involving ankle and foot     Past Surgical History:   Procedure Laterality Date      ECHO HEART XTHORACIC, STRESS/REST  7/2007    Negative       Social History     Tobacco Use     Smoking status: Never Smoker     Smokeless tobacco: Never Used   Substance Use Topics     Alcohol use: Yes     Comment: 6 drinks weekly     Family History   Problem Relation Age of Onset     Myocardial Infarction Mother 70     Coronary Artery Disease Father      Diabetes Type 2  Father          Current Outpatient Medications   Medication Sig Dispense Refill     IBUPROFEN PO Take 200 mg by mouth       methylPREDNISolone (MEDROL) 4 MG tablet Take 4 mg by mouth daily       SUMAtriptan (IMITREX) 25 " MG tablet Take 25 mg by mouth at onset of headache for migraine       Acetaminophen (TYLENOL PO)        Psyllium (METAMUCIL PO) Take 17 g by mouth daily       VITAMIN D OR 1 tablet daily       Allergies   Allergen Reactions     No Known Drug Allergies      Seasonal Allergies      Recent Labs   Lab Test 07/11/19  1005 04/14/14  1418 04/25/13  1756   *  --   --    HDL 64  --   --    TRIG 97  --   --    ALT 27 22 31   CR 0.89 0.78 0.82   GFRESTIMATED >90 >90 >90   GFRESTBLACK >90 >90 >90   POTASSIUM 4.9 3.6 4.2   TSH 1.90  --  2.84      BP Readings from Last 3 Encounters:   07/11/19 (!) 141/96   04/24/15 134/87   03/27/15 137/81    Wt Readings from Last 3 Encounters:   07/11/19 100.7 kg (222 lb)   04/24/15 95.8 kg (211 lb 3.2 oz)   03/27/15 93.2 kg (205 lb 6.4 oz)                    Reviewed and updated as needed this visit by Provider         Review of Systems   Constitutional, HEENT, cardiovascular, pulmonary, gi and gu systems are negative, except as otherwise noted.       Objective   Reported vitals:  There were no vitals taken for this visit.   healthy, alert and no distress  PSYCH: Alert and oriented times 3; coherent speech, normal   rate and volume, able to articulate logical thoughts, able   to abstract reason, no tangential thoughts, no hallucinations   or delusions  His affect is normal  RESP: No cough, no audible wheezing, able to talk in full sentences  Remainder of exam unable to be completed due to telephone visits    Diagnostic Test Results:  Labs reviewed in Epic        Assessment/Plan:  1. Nonintractable episodic headache, unspecified headache type    Also notes some difficulty with balance at times and shakiness  Headaches persistent for months now.   - NEUROLOGY ADULT REFERRAL    2. Daytime somnolence  3. Fatigue, unspecified type  4. Muscle ache   symptoms for the past year. Does not feel he gets restful sleep. Wife notices he moves a lot during the night. He will schedule with sleep  consultant.   Evaluated for fatigue last year and had normal TSH, CBC, CMP, EKG. Symptoms have remained mostly unchanged except for onset of headaches in the spring.   - SLEEP EVALUATION & MANAGEMENT REFERRAL - ADULT -[facility] [place] [phone] (Age 15 and up); Future            No follow-ups on file.      Phone call duration:  25 minutes    Mona Szymanski MD

## 2020-07-23 ENCOUNTER — MYC MEDICAL ADVICE (OUTPATIENT)
Dept: FAMILY MEDICINE | Facility: CLINIC | Age: 50
End: 2020-07-23

## 2020-07-23 DIAGNOSIS — R53.83 FATIGUE: Primary | ICD-10-CM

## 2020-07-27 NOTE — TELEPHONE ENCOUNTER
It is something to consider. Did he have a deer tick exposure? Was he in a tick-infested area prior to symptoms beginning? Did he have a target-like rash?  I would recommend completing his visits with the sleep clinic this week and with neurology and find out their recommendations. I could place an order for a lyme test if he still wishes to do so. Mona Szymanski M.D.

## 2020-07-31 ENCOUNTER — OFFICE VISIT (OUTPATIENT)
Dept: NEUROLOGY | Facility: CLINIC | Age: 50
End: 2020-07-31
Payer: COMMERCIAL

## 2020-07-31 VITALS
TEMPERATURE: 98.6 F | HEART RATE: 73 BPM | SYSTOLIC BLOOD PRESSURE: 110 MMHG | BODY MASS INDEX: 28.74 KG/M2 | HEIGHT: 76 IN | WEIGHT: 236 LBS | DIASTOLIC BLOOD PRESSURE: 74 MMHG | OXYGEN SATURATION: 97 %

## 2020-07-31 DIAGNOSIS — R51.9 HEADACHE DISORDER: Primary | ICD-10-CM

## 2020-07-31 DIAGNOSIS — M79.10 GENERALIZED MUSCLE ACHE: ICD-10-CM

## 2020-07-31 DIAGNOSIS — R53.83 FATIGUE: ICD-10-CM

## 2020-07-31 DIAGNOSIS — R53.83 FATIGUE, UNSPECIFIED TYPE: ICD-10-CM

## 2020-07-31 PROCEDURE — 86617 LYME DISEASE ANTIBODY: CPT | Mod: 90 | Performed by: FAMILY MEDICINE

## 2020-07-31 PROCEDURE — 99000 SPECIMEN HANDLING OFFICE-LAB: CPT | Performed by: FAMILY MEDICINE

## 2020-07-31 PROCEDURE — 36415 COLL VENOUS BLD VENIPUNCTURE: CPT | Performed by: FAMILY MEDICINE

## 2020-07-31 PROCEDURE — 99205 OFFICE O/P NEW HI 60 MIN: CPT | Performed by: PSYCHIATRY & NEUROLOGY

## 2020-07-31 ASSESSMENT — MIFFLIN-ST. JEOR: SCORE: 2026.99

## 2020-07-31 NOTE — PATIENT INSTRUCTIONS
AFTER VISIT SUMMARY (AVS):    At today's visit we thoroughly discussed your symptoms.  I discussed that your localized left occipital headaches might be due to occipital neuralgia.  We decided to try a new pillow and to adjust your positioning in sleep, but if your symptoms persist, please come back for referral for the occipital nerve block.    I do not see any additional neurological conditions to explain your other symptoms and do not think that any neurological investigations are warranted at the present time.  However, if you develop new neurological symptoms, please come back for additional evaluation.    No new medications.    Next follow-up appointment is on as needed basis.    Please do not hesitate to call me with any questions or concerns.    Thanks.

## 2020-07-31 NOTE — LETTER
"    7/31/2020         RE: Fito Frye  [address]  [facility] [address]        Dear Colleague,    Thank you for referring your patient, Fito Frye, to the [facility]. Please see a copy of my visit note below.    INITIAL NEUROLOGY CONSULTATION    DATE OF VISIT: 7/31/2020  CLINIC LOCATION: [facility]  MRN: [id number]  PATIENT NAME: Fito Frye  YOB: 1970    PRIMARY CARE PROVIDER: Mona Szymanski MD.     REASON FOR VISIT:   Chief Complaint   Patient presents with     Headache     fatigue, vision change, light headed     HISTORY OF PRESENT ILLNESS:                                                    Mr. Fito Frye is 49 year old right handed male patient without significant past medical history, who was seen in consultation today requested by Mona Szymanski MD, for headache.    Per patient's report, in March 2020 he developed intermittent headaches, fatigue, dizziness/lightheadedness and diffuse muscle aches.  [facility] \"migraine medicine\" (sumatriptan), which was not helpful.    The patient reports that he feels the worst in the morning when he wakes up describing as he has \"been run over\".  Every muscle in the body hurts, and he feels lightheaded.  After 2 hours feels better.  Around lunch feels bad again, but not as bad as around 4 PM or in the evening.  Feels like physical activity or gardening make his symptoms worse.  Sleeping or laying flat and hot shower are helpful.  No other aggravating alleviating factors.  Tried hot and cold packs without significant help.  Currently takes Tylenol and ibuprofen several times daily and in the middle of the night for headaches and muscle discomfort.    Patient's intermittent left occipital pressure 6-7/10 pain/headache starts in the morning and occurs intermittently throughout the day with occasional brief sharp episodes.  It could be triggered when he " looks up.  Denies any other associated symptoms.  No other aggravating or alleviating factors.  No focal neurological complaints.    No recent labs.  Denies prior history of significant head injuries, seizures, or CNS infections.    Brain MRI with and without contrast from 5/2/2013, performed for a month history of headaches, dizziness, and near syncopal episode with blurred vision and weakness, was normal.    No additional useful information is available in Care Everywhere, which was reviewed.    Review of Systems - the patient endorses multiple chronic complaints on 14-point comprehensive review of systems of the Patient Health History Form, that were reviewed and will be scanned into his electronic medical record at the end of this encounter. His primary care and other medical providers are aware and addressing all of them.  PAST MEDICAL/SURGICAL HISTORY:                                                    I personally reviewed patient's past medical and surgical history with the patient at today's visit.  Patient Active Problem List   Diagnosis     CARDIOVASCULAR SCREENING; LDL GOAL LESS THAN 160     Porokeratosis plantaris discreta     Pain in joint involving ankle and foot     Past Medical History:   Diagnosis Date     NO ACTIVE PROBLEMS      Past Surgical History:   Procedure Laterality Date     HC ECHO HEART XTHORACIC, STRESS/REST  7/2007    Negative     MEDICATIONS:                                                    I personally reviewed patient's medications and allergies with the patient at today's visit.  Acetaminophen (TYLENOL PO),   IBUPROFEN PO, Take 200 mg by mouth  Ibuprofen-diphenhydrAMINE HCl (IBUPROFEN PM) 200-25 MG CAPS, Take 1 capsule by mouth at bedtime as needed, may repeat once  VITAMIN D OR, 1 tablet daily  Psyllium (METAMUCIL PO), Take 17 g by mouth daily    No current facility-administered medications on file prior to visit.     ALLERGIES:                                                   "    Allergies   Allergen Reactions     No Known Drug Allergies      Seasonal Allergies      FAMILY/SOCIAL HISTORY:                                                    Family and social history was reviewed with the patient at today's visit.  No family history of neurological disorders.  Never smoker.  2 beers/alcohol drinks daily.  Denies current recreational drug use.  , lives with his family.  Works full-time.  REVIEW OF SYSTEMS:                                                    Patient has completed a [facility] Patient Health History, including a 14-system review, which was personally reviewed, and pertinent positives are listed in HPI. He denies any additional problems on the further questioning.  EXAM:                                                    VITAL SIGNS:   BP (!) 146/99   Pulse 73   Temp 98.6  F (37  C) (Oral)   Ht 1.93 m (6' 4\")   Wt 107 kg (236 lb)   SpO2 97%   BMI 28.73 kg/m     Repeat vital signs: /74 (BP Location: Left arm, Patient Position: Sitting, Cuff Size: Adult Regular)   Pulse 73   Temp 98.6  F (37  C) (Oral)   Ht 1.93 m (6' 4\")   Wt 107 kg (236 lb)   SpO2 97%   BMI 28.73 kg/m  .  Mini-Cog Assessment:  Mini Cog Assessment  Clock Draw Score: 2 Normal  3 Item Recall: 2 objects recalled  Mini Cog Total Score: 4    General: pt is in NAD, cooperative.  Skin: normal turgor, moist mucous membranes, no lesions/rashes noticed.  HEENT: ATNC, EOMI, PERRL, white sclera, normal conjunctiva, no nystagmus or ptosis. No carotid bruits bilaterally.  Respiratory: lung sounds clear to auscultation bilaterally, no crackles, wheezes, rhonchi. Symmetric lung excursion, no accessory respiratory muscle use.  Cardiovascular: normal S1/S2, no murmurs/rubs/gallops.   Abdomen: Not distended.  : deferred.    Neurological:  Mental: alert, follows commands, Mini Cog Total Score: 4/5 with 2/3 on memory recall, no aphasia or dysarthria. Fund of knowledge is appropriate for " age.  Cranial Nerves:  CN II: visual acuity - able to accurately count fingers with each eye. Visual fields intact, fundi: discs sharp, no papilledema and normal vessels bilaterally.  CN III, IV, VI: EOM intact, pupils equal and reactive.  No Cogan's twitch after 45 seconds upgaze.  CN V: facial sensation nl  CN VII: face symmetric, no facial droop  CN VIII: hearing normal  CN IX: palate elevation symmetric, uvula at midline  CN XI SCM normal, shoulder shrug nl  CN XII: tongue midline  Motor: Strength: 5/5 in all major groups of all extremities. Normal tone. No abnormal movements. No pronator drift b/l.  No drift of outstretched abducted arms after 90 seconds.  Reflexes: Triceps, biceps, brachioradialis, patellar, and achilles reflexes are normal and symmetric. No clonus noted. Toes are down-going b/l.   Sensory: temperature, light touch, pinprick, and vibration intact, except patchy area of reduced sensation on the left foot (chronic, related to history of several fractures and surgery). Romberg: negative.  Coordination: FNF and heel-shin tests intact b/l.  Gait:  Normal, able to tandem, toe, and heel walk.  Tenderness to palpation over the lesser occipital nerve on the left side.  DATA:   LABS/IMAGING/OTHER STUDIES: I reviewed pertinent medical records, including of brain MRI images and Care Everywhere, as detailed in the history of present illness.  ASSESSMENT AND PLAN:      ASSESSMENT: Fito Frye is a 49 year old male patient with listed above past medical history, who presents with left occipital headache and several other nonspecific symptoms.    We had a detailed discussion with the patient regarding his presenting complaints.  The neurological exam today is non-focal.  He has tenderness to palpation over the left lesser occipital nerve that might indicate left occipital neuralgia.  Given his excessive analgesic use, there could be a component of rebound/medication overuse headaches.  We reviewed these  diagnoses, available treatment options, and the plan.  He wants to adjust his position in sleep and try a new pillow that he just bought before proceeding with occipital nerve block, which I think is reasonable.  He will cut down on the use of over-the-counter analgesics.    We reviewed that I do not see any other neurological conditions to explain his ongoing fatigue and muscle aches.  I advised him to return to his primary care provider to continue his evaluation.    Fito to follow up with Primary Care provider regarding elevated blood pressure.     DIAGNOSES:    ICD-10-CM    1. Headache disorder  R51    2. Fatigue, unspecified type  R53.83    3. Generalized muscle ache  M79.10      PLAN: At today's visit we thoroughly discussed patient's symptoms.  His localized left occipital headaches might be due to occipital neuralgia.  We decided to try a new pillow and to adjust his positioning in sleep, but he was advised to come back for referral for the occipital nerve block if symptoms persist.    I do not see any additional neurological conditions to explain his other symptoms and do not think that any neurological investigations are warranted at the present time.  However, if he develops new neurological symptoms, he was asked to come back for additional evaluation.    No new medications.    Next follow-up appointment is on as needed basis.    Total Time:  81 minutes with > 50% spent counseling the patient on stated above assessment and recommendations, including nature of the diagnosis, available treatment options, and proposed plan.  Additional time was used to answer questions regarding patient's symptoms, my recommendations, and the plan.    Humphrey Logan MD  [facility]  [place]  (Chart documentation was completed in part with Dragon voice-recognition software. Even though reviewed, some grammatical, spelling, and word errors may remain.)            Again, thank you for allowing me to  participate in the care of your patient.        Sincerely,        Humphrey Logan MD

## 2020-07-31 NOTE — PROGRESS NOTES
"INITIAL NEUROLOGY CONSULTATION    DATE OF VISIT: 7/31/2020  CLINIC LOCATION: [facility]  MRN: [id number]  PATIENT NAME: Fito Frye  YOB: 1970    PRIMARY CARE PROVIDER: Mona Szymanski MD.     REASON FOR VISIT:   Chief Complaint   Patient presents with     Headache     fatigue, vision change, light headed     HISTORY OF PRESENT ILLNESS:                                                    Mr. Fito Frye is 49 year old right handed male patient without significant past medical history, who was seen in consultation today requested by Mona Szymanski MD, for headache.    Per patient's report, in March 2020 he developed intermittent headaches, fatigue, dizziness/lightheadedness and diffuse muscle aches.  [facility] \"mig[facility] medicine\" (sumatriptan), which was not helpful.    The patient reports that he feels the worst in the morning when he wakes up describing as he has \"been run over\".  Every muscle in the body hurts, and he feels lightheaded.  After 2 hours feels better.  Around lunch feels bad again, but not as bad as around 4 PM or in the evening.  Feels like physical activity or gardening make his symptoms worse.  Sleeping or laying flat and hot shower are helpful.  No other aggravating alleviating factors.  Tried hot and cold packs without significant help.  Currently takes Tylenol and ibuprofen several times daily and in the middle of the night for headaches and muscle discomfort.    Patient's intermittent left occipital pressure 6-7/10 pain/headache starts in the morning and occurs intermittently throughout the day with occasional brief sharp episodes.  It could be triggered when he looks up.  Denies any other associated symptoms.  No other aggravating or alleviating factors.  No focal neurological complaints.    No recent labs.  Denies prior history of significant head injuries, seizures, or CNS infections.    Brain MRI with and " without contrast from 5/2/2013, performed for a month history of headaches, dizziness, and near syncopal episode with blurred vision and weakness, was normal.    No additional useful information is available in Care Everywhere, which was reviewed.    Review of Systems - the patient endorses multiple chronic complaints on 14-point comprehensive review of systems of the Patient Health History Form, that were reviewed and will be scanned into his electronic medical record at the end of this encounter. His primary care and other medical providers are aware and addressing all of them.  PAST MEDICAL/SURGICAL HISTORY:                                                    I personally reviewed patient's past medical and surgical history with the patient at today's visit.  Patient Active Problem List   Diagnosis     CARDIOVASCULAR SCREENING; LDL GOAL LESS THAN 160     Porokeratosis plantaris discreta     Pain in joint involving ankle and foot     Past Medical History:   Diagnosis Date     NO ACTIVE PROBLEMS      Past Surgical History:   Procedure Laterality Date     HC ECHO HEART XTHORACIC, STRESS/REST  7/2007    Negative     MEDICATIONS:                                                    I personally reviewed patient's medications and allergies with the patient at today's visit.  Acetaminophen (TYLENOL PO),   IBUPROFEN PO, Take 200 mg by mouth  Ibuprofen-diphenhydrAMINE HCl (IBUPROFEN PM) 200-25 MG CAPS, Take 1 capsule by mouth at bedtime as needed, may repeat once  VITAMIN D OR, 1 tablet daily  Psyllium (METAMUCIL PO), Take 17 g by mouth daily    No current facility-administered medications on file prior to visit.     ALLERGIES:                                                      Allergies   Allergen Reactions     No Known Drug Allergies      Seasonal Allergies      FAMILY/SOCIAL HISTORY:                                                    Family and social history was reviewed with the patient at today's visit.  No family  "history of neurological disorders.  Never smoker.  2 beers/alcohol drinks daily.  Denies current recreational drug use.  , lives with his family.  Works full-time.  REVIEW OF SYSTEMS:                                                    Patient has completed a [facility] Patient Health History, including a 14-system review, which was personally reviewed, and pertinent positives are listed in HPI. He denies any additional problems on the further questioning.  EXAM:                                                    VITAL SIGNS:   BP (!) 146/99   Pulse 73   Temp 98.6  F (37  C) (Oral)   Ht 1.93 m (6' 4\")   Wt 107 kg (236 lb)   SpO2 97%   BMI 28.73 kg/m     Repeat vital signs: /74 (BP Location: Left arm, Patient Position: Sitting, Cuff Size: Adult Regular)   Pulse 73   Temp 98.6  F (37  C) (Oral)   Ht 1.93 m (6' 4\")   Wt 107 kg (236 lb)   SpO2 97%   BMI 28.73 kg/m  .  Mini-Cog Assessment:  Mini Cog Assessment  Clock Draw Score: 2 Normal  3 Item Recall: 2 objects recalled  Mini Cog Total Score: 4    General: pt is in NAD, cooperative.  Skin: normal turgor, moist mucous membranes, no lesions/rashes noticed.  HEENT: ATNC, EOMI, PERRL, white sclera, normal conjunctiva, no nystagmus or ptosis. No carotid bruits bilaterally.  Respiratory: lung sounds clear to auscultation bilaterally, no crackles, wheezes, rhonchi. Symmetric lung excursion, no accessory respiratory muscle use.  Cardiovascular: normal S1/S2, no murmurs/rubs/gallops.   Abdomen: Not distended.  : deferred.    Neurological:  Mental: alert, follows commands, Mini Cog Total Score: 4/5 with 2/3 on memory recall, no aphasia or dysarthria. Fund of knowledge is appropriate for age.  Cranial Nerves:  CN II: visual acuity - able to accurately count fingers with each eye. Visual fields intact, fundi: discs sharp, no papilledema and normal vessels bilaterally.  CN III, IV, VI: EOM intact, pupils equal and reactive.  No Cogan's twitch " after 45 seconds upgaze.  CN V: facial sensation nl  CN VII: face symmetric, no facial droop  CN VIII: hearing normal  CN IX: palate elevation symmetric, uvula at midline  CN XI SCM normal, shoulder shrug nl  CN XII: tongue midline  Motor: Strength: 5/5 in all major groups of all extremities. Normal tone. No abnormal movements. No pronator drift b/l.  No drift of outstretched abducted arms after 90 seconds.  Reflexes: Triceps, biceps, brachioradialis, patellar, and achilles reflexes are normal and symmetric. No clonus noted. Toes are down-going b/l.   Sensory: temperature, light touch, pinprick, and vibration intact, except patchy area of reduced sensation on the left foot (chronic, related to history of several fractures and surgery). Romberg: negative.  Coordination: FNF and heel-shin tests intact b/l.  Gait:  Normal, able to tandem, toe, and heel walk.  Tenderness to palpation over the lesser occipital nerve on the left side.  DATA:   LABS/IMAGING/OTHER STUDIES: I reviewed pertinent medical records, including of brain MRI images and Care Everywhere, as detailed in the history of present illness.  ASSESSMENT AND PLAN:      ASSESSMENT: Fito Frye is a 49 year old male patient with listed above past medical history, who presents with left occipital headache and several other nonspecific symptoms.    We had a detailed discussion with the patient regarding his presenting complaints.  The neurological exam today is non-focal.  He has tenderness to palpation over the left lesser occipital nerve that might indicate left occipital neuralgia.  Given his excessive analgesic use, there could be a component of rebound/medication overuse headaches.  We reviewed these diagnoses, available treatment options, and the plan.  He wants to adjust his position in sleep and try a new pillow that he just bought before proceeding with occipital nerve block, which I think is reasonable.  He will cut down on the use of  over-the-counter analgesics.    We reviewed that I do not see any other neurological conditions to explain his ongoing fatigue and muscle aches.  I advised him to return to his primary care provider to continue his evaluation.    Fito to follow up with Primary Care provider regarding elevated blood pressure.     DIAGNOSES:    ICD-10-CM    1. Headache disorder  R51    2. Fatigue, unspecified type  R53.83    3. Generalized muscle ache  M79.10      PLAN: At today's visit we thoroughly discussed patient's symptoms.  His localized left occipital headaches might be due to occipital neuralgia.  We decided to try a new pillow and to adjust his positioning in sleep, but he was advised to come back for referral for the occipital nerve block if symptoms persist.    I do not see any additional neurological conditions to explain his other symptoms and do not think that any neurological investigations are warranted at the present time.  However, if he develops new neurological symptoms, he was asked to come back for additional evaluation.    No new medications.    Next follow-up appointment is on as needed basis.    Total Time:  81 minutes with > 50% spent counseling the patient on stated above assessment and recommendations, including nature of the diagnosis, available treatment options, and proposed plan.  Additional time was used to answer questions regarding patient's symptoms, my recommendations, and the plan.    Humphrey Logan MD  [facility]  [place]  (Chart documentation was completed in part with Dragon voice-recognition software. Even though reviewed, some grammatical, spelling, and word errors may remain.)

## 2020-08-02 LAB — B BURGDOR IGG SER QL IB: NEGATIVE

## 2020-08-03 NOTE — RESULT ENCOUNTER NOTE
Excellent! Please call or sent a Un[employer] message if you have any questions. Mona Szymanski M.D.

## 2020-08-06 ENCOUNTER — TELEPHONE (OUTPATIENT)
Dept: PALLIATIVE MEDICINE | Facility: CLINIC | Age: 50
End: 2020-08-06

## 2020-08-06 NOTE — TELEPHONE ENCOUNTER
Pt scheduled 8/10 for Peripheral nerve block: Occipital.    Clary VOSS    [facility]

## 2020-08-10 ENCOUNTER — OFFICE VISIT (OUTPATIENT)
Dept: PALLIATIVE MEDICINE | Facility: CLINIC | Age: 50
End: 2020-08-10
Payer: COMMERCIAL

## 2020-08-10 VITALS — SYSTOLIC BLOOD PRESSURE: 124 MMHG | OXYGEN SATURATION: 98 % | DIASTOLIC BLOOD PRESSURE: 87 MMHG | HEART RATE: 87 BPM

## 2020-08-10 DIAGNOSIS — M54.81 OCCIPITAL NEURALGIA OF LEFT SIDE: Primary | ICD-10-CM

## 2020-08-10 PROCEDURE — 64405 NJX AA&/STRD GR OCPL NRV: CPT | Mod: LT | Performed by: PHYSICAL MEDICINE & REHABILITATION

## 2020-08-10 RX ORDER — BUPIVACAINE HYDROCHLORIDE 5 MG/ML
1 INJECTION, SOLUTION EPIDURAL; INTRACAUDAL ONCE
Status: COMPLETED | OUTPATIENT
Start: 2020-08-10 | End: 2020-08-10

## 2020-08-10 RX ORDER — TRIAMCINOLONE ACETONIDE 40 MG/ML
40 INJECTION, SUSPENSION INTRA-ARTICULAR; INTRAMUSCULAR ONCE
Status: COMPLETED | OUTPATIENT
Start: 2020-08-10 | End: 2020-08-10

## 2020-08-10 RX ADMIN — BUPIVACAINE HYDROCHLORIDE 5 MG: 5 INJECTION, SOLUTION EPIDURAL; INTRACAUDAL at 16:31

## 2020-08-10 RX ADMIN — TRIAMCINOLONE ACETONIDE 40 MG: 40 INJECTION, SUSPENSION INTRA-ARTICULAR; INTRAMUSCULAR at 16:31

## 2020-08-10 ASSESSMENT — PAIN SCALES - GENERAL: PAINLEVEL: EXTREME PAIN (8)

## 2020-08-10 NOTE — PATIENT INSTRUCTIONS
[facility]   Post Procedure Instructions    Today you had:     occipital nerve block         Medications used:  lidocaine   bupivicaine   kenolog   dexamethasone          Go to the emergency room if you develop any shortness of breath    Monitor the injection sites for signs and symptoms of infection-fever, chills, redness, swelling, warmth, or drainage to areas.    You may have soreness at injection sites for up to 24 hours.    You may apply ice to the painful areas to help minimize the discomfort of the needle pokes.    Do not apply heat to sites for at least 12 hours.    You may use anti-inflammatory medications or Tylenol for pain control if necessary    Pain Clinic phone number during work hours Monday-Friday:  [phone]    After hours provider line: [phone]

## 2020-08-10 NOTE — PROGRESS NOTES
Pre procedure Diagnosis: occipital neuralgia   Post procedure Diagnosis: Same  Procedure performed: Left occipital nerve block  Anesthesia: none  Complications: none  Operators: Tala Gillis MD     Indications:   Fito Frye is a 49 year old male with a history of left sided headaches which start in posterior head and radiate anteriorly to the left eye.  Exam shows tenderness over left suboccipital region and they have tried conservative treatment including medications.    Options/alternatives, benefits and risks were discussed with the patient including bleeding, infection, hematoma, nerve damage, and stroke, Questions were answered to his satisfaction and he agrees to proceed. Voluntary informed consent was obtained and signed.     Vitals were reviewed: Yes  Allergies were reviewed:  Yes   Medications were reviewed:  Yes   Pre-procedure pain score: 7/10    Procedure:  After getting informed consent, a Pause for the Cause was performed.    The occipital ridge, occipital protuberance, and mastoid process were palpated on the left side.  The location of maximal tenderness which was consistent with the location of the greater occipital nerve was palpated.  The area was cleaned.    Palpation for a pulse was completed, with no pulse noted at the site of the injection.  A 25G, 1.5 inch needle was introduced, aimed cephalad, in the superficial tissues at this area of tenderness.  The injection was completed at this location, fanning in 3 different directions.  Aspiration for heme was negative before all injections.    In total, 1 ml of 0.5% bupivacaine, 1ml of 1% lidocaine and 40 mg was injected.     The patient tolerated the procedure well, hemostasis was achieved.      Post-procedure pain score: 0/10 in left eye (this is compared to pre-procedure pain of 5/10 in the left eye. He states it is difficult to rate pain in left posterior head)    Follow-up includes:   -f/u with referring provider    MD ANABEL Tripathi  [facility]

## 2020-10-28 ENCOUNTER — NURSE TRIAGE (OUTPATIENT)
Dept: FAMILY MEDICINE | Facility: CLINIC | Age: 50
End: 2020-10-28

## 2020-10-28 NOTE — TELEPHONE ENCOUNTER
See 10/27/20 MyChart encounter.  Patient report of chest pain.    Left message to call back and ask to speak with an available triage nurse.  LENNOX BernalN, RN

## 2020-11-05 ENCOUNTER — OFFICE VISIT (OUTPATIENT)
Dept: NEUROLOGY | Facility: CLINIC | Age: 50
End: 2020-11-05
Attending: FAMILY MEDICINE
Payer: COMMERCIAL

## 2020-11-05 VITALS
WEIGHT: 243 LBS | TEMPERATURE: 97.7 F | HEART RATE: 75 BPM | SYSTOLIC BLOOD PRESSURE: 136 MMHG | OXYGEN SATURATION: 96 % | BODY MASS INDEX: 29.58 KG/M2 | DIASTOLIC BLOOD PRESSURE: 87 MMHG

## 2020-11-05 DIAGNOSIS — M54.2 CERVICALGIA: ICD-10-CM

## 2020-11-05 DIAGNOSIS — R51.9 HEADACHE DISORDER: Primary | ICD-10-CM

## 2020-11-05 PROCEDURE — 99354 PR PROLONGED SERV,OFFICE,1ST HR: CPT | Performed by: PSYCHIATRY & NEUROLOGY

## 2020-11-05 PROCEDURE — 99215 OFFICE O/P EST HI 40 MIN: CPT | Performed by: PSYCHIATRY & NEUROLOGY

## 2020-11-05 RX ORDER — NORTRIPTYLINE HCL 10 MG
10 CAPSULE ORAL AT BEDTIME
Qty: 31 CAPSULE | Refills: 3 | Status: SHIPPED | OUTPATIENT
Start: 2020-11-20 | End: 2021-03-25

## 2020-11-05 NOTE — PROGRESS NOTES
"ESTABLISHED PATIENT NEUROLOGY NOTE    DATE OF VISIT: 11/5/2020  CLINIC LOCATION: [facility]  MRN: [id number]  PATIENT NAME: Fito Frye  YOB: 1970    PCP: Mona Szymanski MD.    REASON FOR VISIT:   Chief Complaint   Patient presents with     Headache     x 1 month     SUBJECTIVE:                                                      HISTORY OF PRESENT ILLNESS: Patient, previously seen once for left occipital neuralgia, presents again on the referral of his primary care provider for headache recurrence and to discuss a new symptom, \"altered head sensations\". Please refer to my initial note from 7/31/2020 for further information.    Since the last visit, the patient reports that after the left occipital block on 8/10/2020 he felt fine for approximately a month.  He was able to sleep well and stopped using over-the-counter analgesics.  Then, the pain returned, but feels different at the present time.  He does not feel significant pressure in the left occipital area with occasional brief sharp episodes.    Instead, he reports intermittent pressure up to 6/10 headache that affects both frontotemporal and bioccipital areas in headband fashion almost daily in the second part of the day, especially when he is physically tired after work.  Additional associated symptoms include \"brain freeze\", dizziness, fatigue, and diffuse muscle tenderness.  Headaches affect his ability to sleep.  He denies any phonophobia, photophobia, nausea, vomiting, or intolerance of physical activity.  No additional aggravating or alleviating factors.  He restarted taking ibuprofen and Tylenol almost daily over the last month.    In addition, the patient reports altered sensations in his head.  It feels to him at times that his jaw is out of alignment and occasionally \"cracks\".  He denies any facial paresthesias or numbness.  He also denies any new focal neurological symptoms.    In 2013, he " experienced very similar symptoms of headaches, dizziness, fatigue, blurry vision, and weakness.  Brain MRI from 5/2/2013 was normal.    On review of systems, patient endorses no other active complaints. Medications, allergies, family and social history were also reviewed. There are no changes reported by patient.  REVIEW OF SYSTEMS:                                                    10-system review was completed. Pertinent positives are included in HPI. The remainder of ROS is negative.  EXAM:                                                    Physical Exam:   Vitals: /87 (BP Location: Right arm)   Pulse 75   Temp 97.7  F (36.5  C) (Oral)   Wt 110.2 kg (243 lb)   SpO2 96%   BMI 29.58 kg/m      General: pt is in NAD, cooperative.  Skin: normal turgor, moist mucous membranes, no lesions/rashes noticed.  HEENT: ATNC, white sclera, normal conjunctiva.  Respiratory: Symmetric lung excursion, no accessory respiratory muscle use.  Abdomen: Non distended.  Neurological: awake, cooperative, follows commands, no aphasia or dysarthria noted, cranial nerves II-XII: no ptosis, face is symmetric, equally moves all extremities, no dysmetria bilaterally, casual gait is normal.  No tenderness to palpation over the greater or lesser occipital nerves bilaterally.  ASSESSMENT AND PLAN:                                                    Assessment: 49-year-old male patient previously seen once for left occipital neuralgia presents with headache recurrence and to discuss recent onset of altered head sensation (mainly his jaw feels out of alignment) on advice from his primary care provider.  His last MRI with and without contrast from May 2013.  It was normal at that time.  His neurological exam today basically unchanged.  There is no tenderness to palpation over the occipital nerves.  There is no need to repeat brain MRI at the present time, but we could consider it in the future if the patient develops new focal  neurological symptoms, or his headaches worsen/become unresponsive to treatment.    I had a prolonged discussion with the patient regarding his presenting complaints, suspected diagnosis, available treatment options, and the plan.  At the present time, his clinical presentation would be most consistent with tension headaches with additional possible components of medication overuse, cervicogenic headaches, and possibly TMJ.  We discussed the role of physical therapy and preventive medications.  I also advised him to try naproxen instead of Tylenol and ibuprofen due to longer duration of action and also limit use to less than 15 days/month to avoid rebound headaches.  The rest of our discussion and the plan are summarized below.    Diagnoses:    ICD-10-CM    1. Headache disorder  R51.9 [facility] PT, HAND, AND CHIROPRACTIC REFERRAL     nortriptyline (PAMELOR) 10 MG capsule   2. Cervicalgia  M54.2 [facility] PT, HAND, AND CHIROPRACTIC REFERRAL     Plan: At today's visit we thoroughly discussed various diagnostic possibilities for patient's symptoms, available treatment options, and the plan, which includes:  Orders Placed This Encounter   Procedures     [facility] PT[facility] HAND, AND CHIR[facility]RACTIC REFERRAL     For headache prevention we decided to try nortriptyline.  I advised him to take 10 mg at bedtime for the next 3 months.  I counseled the patient to contact my clinic if he develops any significant side effects and let me know how he is doing in about 4 to 6 weeks from now via My Chart.  We might further increase nortriptyline dose at that time if no effect, and it is tolerated well.    For acute headache therapy he will try naproxen at 500 mg daily for intolerable headaches.  I counseled the patient to take it with food and limit use to less than 15 days/month.    I advised to keep the headache diary and bring it to the next follow-up visit or upload via My Chart.    Next follow-up appointment is in the next 3 months or earlier if  needed.    Total Time: 71 minutes with > 50% spent counseling the patient on stated above assessment and recommendations.  Additional time was spent on counseling regarding patient's symptoms, suspected diagnosis, treatment options, and the proposed plan.    Humphrey Logan MD  HP/ Neurology

## 2020-11-05 NOTE — LETTER
"    11/5/2020         RE: Fito Frye  [address]  [facility] [address]        Dear Colleague,    Thank you for referring your patient, Fito Frye, to the [facility] [place]. Please see a copy of my visit note below.    ESTABLISHED PATIENT NEUROLOGY NOTE    DATE OF VISIT: 11/5/2020  CLINIC LOCATION: [facility]  MRN: [id number]  PATIENT NAME: Fito Frye  YOB: 1970    PCP: Mona Szymanski MD.    REASON FOR VISIT:   Chief Complaint   Patient presents with     Headache     x 1 month     SUBJECTIVE:                                                      HISTORY OF PRESENT ILLNESS: Patient, previously seen once for left occipital neuralgia, presents again on the referral of his primary care provider for headache recurrence and to discuss a new symptom, \"altered head sensations\". Please refer to my initial note from 7/31/2020 for further information.    Since the last visit, the patient reports that after the left occipital block on 8/10/2020 he felt fine for approximately a month.  He was able to sleep well and stopped using over-the-counter analgesics.  Then, the pain returned, but feels different at the present time.  He does not feel significant pressure in the left occipital area with occasional brief sharp episodes.    Instead, he reports intermittent pressure up to 6/10 headache that affects both frontotemporal and bioccipital areas in headband fashion almost daily in the second part of the day, especially when he is physically tired after work.  Additional associated symptoms include \"brain freeze\", dizziness, fatigue, and diffuse muscle tenderness.  Headaches affect his ability to sleep.  He denies any phonophobia, photophobia, nausea, vomiting, or intolerance of physical activity.  No additional aggravating or alleviating factors.  He restarted taking ibuprofen and Tylenol almost daily over the last month.    In " "addition, the patient reports altered sensations in his head.  It feels to him at times that his jaw is out of alignment and occasionally \"cracks\".  He denies any facial paresthesias or numbness.  He also denies any new focal neurological symptoms.    In 2013, he experienced very similar symptoms of headaches, dizziness, fatigue, blurry vision, and weakness.  Brain MRI from 5/2/2013 was normal.    On review of systems, patient endorses no other active complaints. Medications, allergies, family and social history were also reviewed. There are no changes reported by patient.  REVIEW OF SYSTEMS:                                                    10-system review was completed. Pertinent positives are included in HPI. The remainder of ROS is negative.  EXAM:                                                    Physical Exam:   Vitals: /87 (BP Location: Right arm)   Pulse 75   Temp 97.7  F (36.5  C) (Oral)   Wt 110.2 kg (243 lb)   SpO2 96%   BMI 29.58 kg/m      General: pt is in NAD, cooperative.  Skin: normal turgor, moist mucous membranes, no lesions/rashes noticed.  HEENT: ATNC, white sclera, normal conjunctiva.  Respiratory: Symmetric lung excursion, no accessory respiratory muscle use.  Abdomen: Non distended.  Neurological: awake, cooperative, follows commands, no aphasia or dysarthria noted, cranial nerves II-XII: no ptosis, face is symmetric, equally moves all extremities, no dysmetria bilaterally, casual gait is normal.  No tenderness to palpation over the greater or lesser occipital nerves bilaterally.  ASSESSMENT AND PLAN:                                                    Assessment: 49-year-old male patient previously seen once for left occipital neuralgia presents with headache recurrence and to discuss recent onset of altered head sensation (mainly his jaw feels out of alignment) on advice from his primary care provider.  His last MRI with and without contrast from May 2013.  It was normal at that " time.  His neurological exam today basically unchanged.  There is no tenderness to palpation over the occipital nerves.  There is no need to repeat brain MRI at the present time, but we could consider it in the future if the patient develops new focal neurological symptoms, or his headaches worsen/become unresponsive to treatment.    I had a prolonged discussion with the patient regarding his presenting complaints, suspected diagnosis, available treatment options, and the plan.  At the present time, his clinical presentation would be most consistent with tension headaches with additional possible components of medication overuse, cervicogenic headaches, and possibly TMJ.  We discussed the role of physical therapy and preventive medications.  I also advised him to try naproxen instead of Tylenol and ibuprofen due to longer duration of action and also limit use to less than 15 days/month to avoid rebound headaches.  The rest of our discussion and the plan are summarized below.    Diagnoses:    ICD-10-CM    1. Headache disorder  R51.9 [facility] PT, HAND, AND CHIROPRACTIC REFERRAL     nortriptyline (PAMELOR) 10 MG capsule   2. Cervicalgia  M54.2 [facility] PT[facility] HAND, AND CHIROPRACTIC REFERRAL     Plan: At today's visit we thoroughly discussed various diagnostic possibilities for patient's symptoms, available treatment options, and the plan, which includes:  Orders Placed This Encounter   Procedures     [facility] PT[facility] HAND, AND CHIROPRACTIC REFERRAL     For headache prevention we decided to try nortriptyline.  I advised him to take 10 mg at bedtime for the next 3 months.  I counseled the patient to contact my clinic if he develops any significant side effects and let me know how he is doing in about 4 to 6 weeks from now via My Chart.  We might further increase nortriptyline dose at that time if no effect, and it is tolerated well.    For acute headache therapy he will try naproxen at 500 mg daily for intolerable headaches.  I counseled the  patient to take it with food and limit use to less than 15 days/month.    I advised to keep the headache diary and bring it to the next follow-up visit or upload via My Chart.    Next follow-up appointment is in the next 3 months or earlier if needed.    Total Time: 71 minutes with > 50% spent counseling the patient on stated above assessment and recommendations.  Additional time was spent on counseling regarding patient's symptoms, suspected diagnosis, treatment options, and the proposed plan.    Humphrey Logan MD  / Neurology        Again, thank you for allowing me to participate in the care of your patient.        Sincerely,        Humphrey Logan MD

## 2020-11-05 NOTE — PATIENT INSTRUCTIONS
AFTER VISIT SUMMARY (AVS):    At today's visit we thoroughly discussed various diagnostic possibilities for your symptoms, treatment options, and the plan, which includes:  Orders Placed This Encounter   Procedures     [facility] PT, HAND, AND CHIROPRACTIC REFERRAL     For headache prevention we decided to try nortriptyline.  Please take 10 mg at bedtime for the next 3 months.  Please contact my clinic if you develop any significant side effects and let me know how you doing in about 4 to 6 weeks from now via My Chart.    For acute headache therapy please try naproxen at 500 mg daily if your headache is intolerable.  Please take it with food and limit use to less than 15 days/month.    Please keep the headache diary and bring it to the next follow-up visit or upload via My Chart.    Next follow-up appointment is in the next 3 months or earlier if needed.    Please do not hesitate to call me with any questions or concerns.    Thanks.

## 2020-11-16 ENCOUNTER — HEALTH MAINTENANCE LETTER (OUTPATIENT)
Age: 50
End: 2020-11-16

## 2020-12-03 ENCOUNTER — THERAPY VISIT (OUTPATIENT)
Dept: PHYSICAL THERAPY | Facility: CLINIC | Age: 50
End: 2020-12-03
Attending: PSYCHIATRY & NEUROLOGY
Payer: COMMERCIAL

## 2020-12-03 DIAGNOSIS — R51.9 HEADACHE DISORDER: ICD-10-CM

## 2020-12-03 DIAGNOSIS — M54.2 CERVICALGIA: ICD-10-CM

## 2020-12-03 PROCEDURE — 97530 THERAPEUTIC ACTIVITIES: CPT | Mod: GP | Performed by: PHYSICAL THERAPIST

## 2020-12-03 PROCEDURE — 97161 PT EVAL LOW COMPLEX 20 MIN: CPT | Mod: GP | Performed by: PHYSICAL THERAPIST

## 2020-12-03 PROCEDURE — 97110 THERAPEUTIC EXERCISES: CPT | Mod: GP | Performed by: PHYSICAL THERAPIST

## 2020-12-03 NOTE — PROGRESS NOTES
[facility] Initial Evaluation  Subjective:  The history is provided by the patient.   Therapist Generated HPI Evaluation  Problem details: Pt reports onset of L sided neck pain and H/A symptoms since March of this last year. Has gotten a thorough workup with general med dr and with neurologist. Has tried combinations of medication, injection and other measure to help with symptoms that have gotten him marginal results. Did have good relief from injection in August, but since then symptoms have started to slowly reappear. Currently describes pain in the suboccipital area, worse on the L vs the R. Also notes H/A symptoms that come about 5/7 days during the week that last a couple of hours. Also notes pain around the angle of his jaw, equal on both sides as well as slight numbness in both fingers. Describes pain as achy. Neck pain worse in the AM when wakes up and at night before bed. H/As and jaw pain will come simultaneously and are worse at the end of the day. Pain better with new medication neurologist has given him of nortriptyline. 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 .                                    Patient Health History             Pertinent medical history includes: depression, history of fractures, migraines/headaches and overweight.            Current medications:  Pain medication.                                           Objective:  System              Cervical/Thoracic Evaluation  Arom wnl cervical: Baseine no pain.     AROM:  AROM Cervical:    Flexion:            100% ROM, no sxs  Extension:       Extension 75% ROM, no pain  Rotation:         Left: 100% ROM, 2-3/10 pain on L neck     Right: 100% ROM, 2/10 pain in R neck  Side Bend:      Left: 100% ROM, 3/10 neck pain     Right:  100% ROM, 3/10 neck pain    Strength: 7/10 RPE with deep neck flexor endurace hold AG for 30 sec; 6/10 RPE with extensor endurance for 30  sec  Headaches: cervical  Cervical Myotomes:  normal                      Cervical Dermatomes:  normal                    Cervical Palpation:  : SCM at superior attachment.  Tenderness present at Left:    Sternocleidomstoid and Suboccipitals  Tenderness present at Right:    Sternocleidomstoid and Suboccipitals                                                  General     ROS    Assessment/Plan:    Patient is a 50 year old male with cervical complaints.    Patient has the following significant findings with corresponding treatment plan.                Diagnosis 1:  Headaches  Pain -  hot/cold therapy, US, electric stimulation, mechanical traction, manual therapy, splint/taping/bracing/orthotics, self management, education, directional preference exercise and home program  Decreased ROM/flexibility - manual therapy, therapeutic exercise, therapeutic activity and home program  Decreased joint mobility - manual therapy, therapeutic exercise, therapeutic activity and home program  Decreased strength - therapeutic exercise, therapeutic activities and home program  Impaired muscle performance - neuro re-education and home program  Decreased function - therapeutic activities and home program  Impaired posture - neuro re-education, therapeutic activities and home program  Diagnosis 2:  Neck Pain   Pain -  hot/cold therapy, US, electric stimulation, mechanical traction, manual therapy, splint/taping/bracing/orthotics, self management, education, directional preference exercise and home program  Decreased ROM/flexibility - manual therapy, therapeutic exercise, therapeutic activity and home program  Decreased joint mobility - manual therapy, therapeutic exercise, therapeutic activity and home program  Decreased strength - therapeutic exercise, therapeutic activities and home program  Impaired muscle performance - neuro re-education and home program  Decreased function - therapeutic activities and home program  Impaired posture -  neuro re-education, therapeutic activities and home program    Therapy Evaluation Codes:   1) History comprised of:   Personal factors that impact the plan of care:      None.    Comorbidity factors that impact the plan of care are:      Depression, Migraines/headaches and Overweight.     Medications impacting care: Pain.  2) Examination of Body Systems comprised of:   Body structures and functions that impact the plan of care:      Cervical spine.   Activity limitations that impact the plan of care are:      Cooking, Driving, Reading/Computer work, Sitting and Sleeping.  3) Clinical presentation characteristics are:   Stable/Uncomplicated.  4) Decision-Making    Low complexity using standardized patient assessment instrument and/or measureable assessment of functional outcome.  Cumulative Therapy Evaluation is: Low complexity.    Previous and current functional limitations:  (See Goal Flow Sheet for this information)    Short term and Long term goals: (See Goal Flow Sheet for this information)     Communication ability:  Patient appears to be able to clearly communicate and understand verbal and written communication and follow directions correctly.  Treatment Explanation - The following has been discussed with the patient:   RX ordered/plan of care  Anticipated outcomes  Possible risks and side effects  This patient would benefit from PT intervention to resume normal activities.   Rehab potential is good.    Frequency:  1 X week, once daily  Duration:  for 8 weeks  Discharge Plan:  Achieve all LTG.  Independent in home treatment program.  Reach maximal therapeutic benefit.    Please refer to the daily flowsheet for treatment today, total treatment time and time spent performing 1:1 timed codes.

## 2020-12-10 ENCOUNTER — THERAPY VISIT (OUTPATIENT)
Dept: PHYSICAL THERAPY | Facility: CLINIC | Age: 50
End: 2020-12-10
Payer: COMMERCIAL

## 2020-12-10 DIAGNOSIS — M54.2 NECK PAIN: ICD-10-CM

## 2020-12-10 DIAGNOSIS — R51.9 CEPHALALGIA: ICD-10-CM

## 2020-12-10 PROCEDURE — 97140 MANUAL THERAPY 1/> REGIONS: CPT | Mod: GP | Performed by: PHYSICAL THERAPIST

## 2020-12-10 PROCEDURE — 97110 THERAPEUTIC EXERCISES: CPT | Mod: GP | Performed by: PHYSICAL THERAPIST

## 2020-12-10 PROCEDURE — 97112 NEUROMUSCULAR REEDUCATION: CPT | Mod: GP | Performed by: PHYSICAL THERAPIST

## 2020-12-17 ENCOUNTER — THERAPY VISIT (OUTPATIENT)
Dept: PHYSICAL THERAPY | Facility: CLINIC | Age: 50
End: 2020-12-17
Payer: COMMERCIAL

## 2020-12-17 DIAGNOSIS — M54.2 NECK PAIN: ICD-10-CM

## 2020-12-17 DIAGNOSIS — R51.9 CEPHALALGIA: ICD-10-CM

## 2020-12-17 PROCEDURE — 97140 MANUAL THERAPY 1/> REGIONS: CPT | Mod: GP | Performed by: PHYSICAL THERAPIST

## 2020-12-17 PROCEDURE — 97110 THERAPEUTIC EXERCISES: CPT | Mod: GP | Performed by: PHYSICAL THERAPIST

## 2021-03-24 NOTE — PATIENT INSTRUCTIONS
AFTER VISIT SUMMARY (AVS):    At today's visit we thoroughly discussed current symptoms, available treatment options, and the plan.  I am pleased to hear that your headaches are well controlled on current therapy.    We decided to continue nortriptyline at 10 mg at bedtime and use naproxen on a as needed basis for acute therapy.    Please keep the headache diary and bring it to the next follow-up visit or upload via My Chart.    Next follow-up appointment is in the next 6 months or earlier if needed.    Please do not hesitate to call me with any questions or concerns.    Thanks.

## 2021-03-24 NOTE — PROGRESS NOTES
"ESTABLISHED NEUROLOGY TELEMEDICINE VISIT NOTE.    DATE OF VISIT: 3/25/2021  MRN: [id number]  PATIENT NAME: Fito Frye  YOB: 1970    Fito Frye is a 50 year old male who is being evaluated via a billable video visit due to COVID-19 precautions.      The patient has been notified of following:     \"This video visit will be conducted via a call between you and your physician/provider. We have found that certain health care needs can be provided without the need for an in-person physical exam.  This service lets us provide the care you need with a video conversation.  If a prescription is necessary we can send it directly to your pharmacy.  If lab work is needed we can place an order for that and you can then stop by our lab to have the test done at a later time.    Video visits are billed at different rates depending on your insurance coverage.  Please reach out to your insurance provider with any questions.    If during the course of the call the physician/provider feels a video visit is not appropriate, you will not be charged for this service.\"    Patient has given verbal consent for Video visit? Yes    Will anyone else be joining your video visit? No  {If patient encounters technical issues they should call [phone].    I have reviewed and updated the patient's Past Medical History, Social History, Family History and Medication List.    Aime Burger, SADIE on 3/25/2021 at 7:58 AM   (MA signature)    Additional provider notes:    This is a telemedicine visit that was initiated by the patient and performed with the originating site at patient's home and the distant location, as specified below.  Verbal consent to participate in video visit was obtained.  This visit occurred during the Coronavirus (COVID-19) Public Health Emergency.  I discussed with the patient the nature of our telemedicine visits, that:  - I would evaluate the patient and recommend diagnostics and treatments " based on my assessment  - Our sessions are not being recorded and that personal health information is protected  - Our team would provide follow-up care in person if/when the patient needs it.    REASON FOR VISIT:   Chief Complaint   Patient presents with     Headache     headache follow up     HISTORY OF PRESENT ILLNESS:                                                    Mr. Ftio Frye is 50 year old male patient, who was evaluated today by telemedicine visit for headache, likely multifactorial.  The last visit was on 11/5/2020.  At that time we started nortriptyline and he was referred for physical therapy.  Please refer to my initial/other prior notes for further information.    Since the last visit, the patient reports complete control of his headache and neck pain while he was on nortriptyline 10 mg at bedtime well without noticeable side effects.  Unfortunately, he did not schedule recommended follow-up and ran out of the medication approximately 2 weeks ago.  He noticed that his headaches are coming back.  He feels that naproxen is working well for acute therapy.  It provides noticeable relief in 1 hour.  Denies interval development of new focal neurological symptoms.    On review of systems, patient endorses no additional active complaints. Medications, allergies, family and social history were also reviewed. There are no changes reported by patient.  EXAM:                                                    General: pt is in NAD, cooperative.  Skin: no lesions/rashes noticed.  HEENT: ATNC.  Neurological:  awake, cooperative, follows commands, no aphasia or dysarthria noted, cranial nerves II-XII: no ptosis, face is symmetric, equally moves all extremities,  the patient reports that sensation to the light touch is intact bilaterally, no dysmetria bilaterally, gait is not tested today.  ASSESSMENT AND PLAN:      ASSESSMENT: Fito Frye is a 50 year old male patient, who due to COVID-19 precautions  is evaluated via a telemedicine follow-up visit for likely multifactorial headaches (medication overuse, tension, and possibly cervicogenic).  He reports complete control of headaches and neck pain after starting nortriptyline.  He also tried naproxen for acute therapy and feels that it is very effective.    We had a detailed discussion with the patient regarding his presenting complaints, available treatment options, and the plan.  I advised the patient to restart nortriptyline.  I will send renewed prescription today.  We reviewed that nortriptyline dose could be further increased if tolerated well.  Alternative treatment options include Effexor, Topamax, Depakote, gabapentin, propranolol, and verapamil.  No changes in acute therapy.    DIAGNOSES:    ICD-10-CM    1. Headache disorder  R51.9    2. Cervicalgia  M54.2      PLAN: At today's visit we thoroughly discussed current symptoms, available treatment options, and the plan.  I am pleased to hear that his headaches are well controlled on current therapy.    We decided to continue nortriptyline at 10 mg at bedtime and use naproxen on a as needed basis for acute therapy.    I advised the patient to keep the headache diary and bring it to the next follow-up visit or upload via My Chart.    Next follow-up appointment is in the next 6 months or earlier if needed.    Video-Visit Details    Type of service:  Video Visit    Video Start Time: 8:09 AM.    Video End Time (time video stopped): 8:23 AM.    Originating Location (pt. Location): Home    Distant Location (provider location):  [facility] [place]     Mode of Communication:  Video Conference via [employer]    Total Time: 30 minutes.  14 minutes were spent in video call and the rest for chart review in preparation for this visit and documentation.    Humphrey Logan MD    [facility] Neurology    (Chart documentation was completed in part with Dragon voice-recognition software.  Even though reviewed, some grammatical, spelling, and word errors may remain.)

## 2021-03-25 ENCOUNTER — VIRTUAL VISIT (OUTPATIENT)
Dept: NEUROLOGY | Facility: CLINIC | Age: 51
End: 2021-03-25
Attending: PSYCHIATRY & NEUROLOGY
Payer: COMMERCIAL

## 2021-03-25 VITALS — HEIGHT: 76 IN | WEIGHT: 250 LBS | BODY MASS INDEX: 30.44 KG/M2

## 2021-03-25 DIAGNOSIS — R51.9 HEADACHE DISORDER: Primary | ICD-10-CM

## 2021-03-25 DIAGNOSIS — M54.2 CERVICALGIA: ICD-10-CM

## 2021-03-25 PROCEDURE — 99214 OFFICE O/P EST MOD 30 MIN: CPT | Mod: GT | Performed by: PSYCHIATRY & NEUROLOGY

## 2021-03-25 RX ORDER — NORTRIPTYLINE HCL 10 MG
10 CAPSULE ORAL AT BEDTIME
Qty: 91 CAPSULE | Refills: 1 | Status: SHIPPED | OUTPATIENT
Start: 2021-03-25 | End: 2022-03-03

## 2021-03-25 ASSESSMENT — MIFFLIN-ST. JEOR: SCORE: 2085.49

## 2021-03-25 NOTE — LETTER
"    3/25/2021         RE: Fito Frye  [address] [place] [facility] [address]        Dear Colleague,    Thank you for referring your patient, Fito Frye, to the [facility]UROLOG[facility] [place]. Please see a copy of my visit note below.    ESTABLISHED NEUROLOGY TELEMEDICINE VISIT NOTE.    DATE OF VISIT: 3/25/2021  MRN: [id number]  PATIENT NAME: Fito Frye  YOB: 1970    Fito Frye is a 50 year old male who is being evaluated via a billable video visit due to COVID-19 precautions.      The patient has been notified of following:     \"This video visit will be conducted via a call between you and your physician/provider. We have found that certain health care needs can be provided without the need for an in-person physical exam.  This service lets us provide the care you need with a video conversation.  If a prescription is necessary we can send it directly to your pharmacy.  If lab work is needed we can place an order for that and you can then stop by our lab to have the test done at a later time.    Video visits are billed at different rates depending on your insurance coverage.  Please reach out to your insurance provider with any questions.    If during the course of the call the physician/provider feels a video visit is not appropriate, you will not be charged for this service.\"    Patient has given verbal consent for Video visit? Yes    Will anyone else be joining your video visit? No  {If patient encounters technical issues they should call [phone].    I have reviewed and updated the patient's Past Medical History, Social History, Family History and Medication List.    Aime Burger CMA on 3/25/2021 at 7:58 AM   (MA signature)    Additional provider notes:    This is a telemedicine visit that was initiated by the patient and performed with the originating site at patient's home and the distant location, as specified below.  Verbal consent to " participate in video visit was obtained.  This visit occurred during the Coronavirus (COVID-19) Public Health Emergency.  I discussed with the patient the nature of our telemedicine visits, that:  - I would evaluate the patient and recommend diagnostics and treatments based on my assessment  - Our sessions are not being recorded and that personal health information is protected  - Our team would provide follow-up care in person if/when the patient needs it.    REASON FOR VISIT:   Chief Complaint   Patient presents with     Headache     headache follow up     HISTORY OF PRESENT ILLNESS:                                                    Mr. Fito Frye is 50 year old male patient, who was evaluated today by telemedicine visit for headache, likely multifactorial.  The last visit was on 11/5/2020.  At that time we started nortriptyline and he was referred for physical therapy.  Please refer to my initial/other prior notes for further information.    Since the last visit, the patient reports complete control of his headache and neck pain while he was on nortriptyline 10 mg at bedtime well without noticeable side effects.  Unfortunately, he did not schedule recommended follow-up and ran out of the medication approximately 2 weeks ago.  He noticed that his headaches are coming back.  He feels that naproxen is working well for acute therapy.  It provides noticeable relief in 1 hour.  Denies interval development of new focal neurological symptoms.    On review of systems, patient endorses no additional active complaints. Medications, allergies, family and social history were also reviewed. There are no changes reported by patient.  EXAM:                                                    General: pt is in NAD, cooperative.  Skin: no lesions/rashes noticed.  HEENT: ATNC.  Neurological:  awake, cooperative, follows commands, no aphasia or dysarthria noted, cranial nerves II-XII: no ptosis, face is symmetric, equally  moves all extremities,  the patient reports that sensation to the light touch is intact bilaterally, no dysmetria bilaterally, gait is not tested today.  ASSESSMENT AND PLAN:      ASSESSMENT: Fito Frye is a 50 year old male patient, who due to COVID-19 precautions is evaluated via a telemedicine follow-up visit for likely multifactorial headaches (medication overuse, tension, and possibly cervicogenic).  He reports complete control of headaches and neck pain after starting nortriptyline.  He also tried naproxen for acute therapy and feels that it is very effective.    We had a detailed discussion with the patient regarding his presenting complaints, available treatment options, and the plan.  I advised the patient to restart nortriptyline.  I will send renewed prescription today.  We reviewed that nortriptyline dose could be further increased if tolerated well.  Alternative treatment options include Effexor, Topamax, Depakote, gabapentin, propranolol, and verapamil.  No changes in acute therapy.    DIAGNOSES:    ICD-10-CM    1. Headache disorder  R51.9    2. Cervicalgia  M54.2      PLAN: At today's visit we thoroughly discussed current symptoms, available treatment options, and the plan.  I am pleased to hear that his headaches are well controlled on current therapy.    We decided to continue nortriptyline at 10 mg at bedtime and use naproxen on a as needed basis for acute therapy.    I advised the patient to keep the headache diary and bring it to the next follow-up visit or upload via My Chart.    Next follow-up appointment is in the next 6 months or earlier if needed.    Video-Visit Details    Type of service:  Video Visit    Video Start Time: 8:09 AM.    Video End Time (time video stopped): 8:23 AM.    Originating Location (pt. Location): Home    Distant Location (provider location):  [facility]UROLOG[facility] [place]     Mode of Communication:  Video Conference via [employer]    Total Time: 30  minutes.  14 minutes were spent in video call and the rest for chart review in preparation for this visit and documentation.    Humphrey Logan MD    [facility] Neurology    (Chart documentation was completed in part with Dragon voice-recognition software. Even though reviewed, some grammatical, spelling, and word errors may remain.)      Again, thank you for allowing me to participate in the care of your patient.        Sincerely,        Humphrey Logan MD

## 2021-03-27 ENCOUNTER — IMMUNIZATION (OUTPATIENT)
Dept: NURSING | Facility: CLINIC | Age: 51
End: 2021-03-27
Payer: COMMERCIAL

## 2021-03-27 PROCEDURE — 91301 PR COVID VAC MODERNA 100 MCG/0.5 ML IM: CPT

## 2021-03-27 PROCEDURE — 0011A PR COVID VAC MODERNA 100 MCG/0.5 ML IM: CPT

## 2021-04-24 ENCOUNTER — IMMUNIZATION (OUTPATIENT)
Dept: NURSING | Facility: CLINIC | Age: 51
End: 2021-04-24
Attending: INTERNAL MEDICINE
Payer: COMMERCIAL

## 2021-04-24 PROCEDURE — 0012A PR COVID VAC MODERNA 100 MCG/0.5 ML IM: CPT

## 2021-04-24 PROCEDURE — 91301 PR COVID VAC MODERNA 100 MCG/0.5 ML IM: CPT

## 2021-05-17 PROBLEM — R51.9 CEPHALALGIA: Status: RESOLVED | Noted: 2020-12-10 | Resolved: 2021-05-17

## 2021-05-17 PROBLEM — M54.2 NECK PAIN: Status: RESOLVED | Noted: 2020-12-10 | Resolved: 2021-05-17

## 2021-05-17 NOTE — PROGRESS NOTES
Discharge Note    Progress reporting period is from initial eval to Dec 17, 2020.     Fito failed to return for next follow up visit and current status is unknown.  Please see information below for last relevant information on current status.  Patient seen for 3 visits.    SUBJECTIVE  Subjective changes noted by patient:  Pt overall feels like he is making some improvements. States his H/A frequency is down to only 2 days/week. Neck pain is a little less but overall about the same. HEP going ok, not as consistent as he knows he should be. Tired when he gets home from work and lacks energy to do exercises.  .  Current pain level is 4/10.     Previous pain level was  7/10.   Changes in function:  Yes (See Goal flowsheet attached for changes in current functional level)  Adverse reaction to treatment or activity: None    OBJECTIVE  Changes noted in objective findings: Cervical ROM: Extension 100% ROM 5/10 pain, R rot 100% ROM 6/10 pain, L rot 100% ROM 4/10 pain, flexion 100% ROM no increase in sxs      ASSESSMENT/PLAN  Diagnosis: Tension H/As and Neck Pain   DIAGP:  Diagnoses of Neck pain and Cephalalgia were pertinent to this visit.  STG/LTGs have been met or progress has been made towards goals:  Yes, please see goal flowsheet for most current information  Assessment of Progress: current status is unknown.    Last current status: Pt is progressing well   Self Management Plans:  HEP  I have re-evaluated this patient and find that the nature, scope, duration and intensity of the therapy is appropriate for the medical condition of the patient.  Fito continues to require the following intervention to meet STG and LTG's:  HEP.    Recommendations:  Discharge with current home program.  Patient to follow up with MD as needed.    Please refer to the daily flowsheet for treatment today, total treatment time and time spent performing 1:1 timed codes.

## 2021-09-18 ENCOUNTER — HEALTH MAINTENANCE LETTER (OUTPATIENT)
Age: 51
End: 2021-09-18

## 2021-10-18 ENCOUNTER — TRANSFERRED RECORDS (OUTPATIENT)
Dept: HEALTH INFORMATION MANAGEMENT | Facility: CLINIC | Age: 51
End: 2021-10-18

## 2021-12-02 ENCOUNTER — IMMUNIZATION (OUTPATIENT)
Dept: NURSING | Facility: CLINIC | Age: 51
End: 2021-12-02
Payer: COMMERCIAL

## 2021-12-02 PROCEDURE — 0064A COVID-19,PF,MODERNA (18+ YRS BOOSTER .25ML): CPT

## 2021-12-02 PROCEDURE — 91306 COVID-19,PF,MODERNA (18+ YRS BOOSTER .25ML): CPT

## 2022-01-08 ENCOUNTER — HEALTH MAINTENANCE LETTER (OUTPATIENT)
Age: 52
End: 2022-01-08

## 2022-03-03 ENCOUNTER — OFFICE VISIT (OUTPATIENT)
Dept: NEUROLOGY | Facility: CLINIC | Age: 52
End: 2022-03-03
Payer: COMMERCIAL

## 2022-03-03 VITALS
DIASTOLIC BLOOD PRESSURE: 90 MMHG | BODY MASS INDEX: 30.66 KG/M2 | HEIGHT: 76 IN | WEIGHT: 251.8 LBS | OXYGEN SATURATION: 98 % | HEART RATE: 76 BPM | SYSTOLIC BLOOD PRESSURE: 149 MMHG

## 2022-03-03 DIAGNOSIS — M54.2 CERVICALGIA: ICD-10-CM

## 2022-03-03 DIAGNOSIS — G44.209 TENSION HEADACHE: Primary | ICD-10-CM

## 2022-03-03 DIAGNOSIS — G44.40 MEDICATION OVERUSE HEADACHE: ICD-10-CM

## 2022-03-03 DIAGNOSIS — M79.10 MUSCLE PAIN: ICD-10-CM

## 2022-03-03 PROCEDURE — 99214 OFFICE O/P EST MOD 30 MIN: CPT | Performed by: PSYCHIATRY & NEUROLOGY

## 2022-03-03 RX ORDER — NORTRIPTYLINE HCL 10 MG
10 CAPSULE ORAL AT BEDTIME
Qty: 91 CAPSULE | Refills: 1 | Status: SHIPPED | OUTPATIENT
Start: 2022-03-03 | End: 2022-07-28

## 2022-03-03 RX ORDER — NAPROXEN SODIUM 220 MG
220 TABLET ORAL DAILY PRN
COMMUNITY
End: 2022-06-30

## 2022-03-03 NOTE — LETTER
"    3/3/2022         RE: Fito Frye  [address]  [facility] [address]        Dear Colleague,    Thank you for referring your patient, Fito Frye, to the [facility] NEUROLOG[facility]. Please see a copy of my visit note below.    ESTABLISHED PATIENT NEUROLOGY NOTE    DATE OF VISIT: 3/3/2022  CLINIC LOCATION: [facility]  MRN: [id number]  PATIENT NAME: Fito Frye  YOB: 1970    REASON FOR VISIT:   Chief Complaint   Patient presents with     Headache     follow up patient is having daily headaches again since stopping meds      SUBJECTIVE:                                                      HISTORY OF PRESENT ILLNESS: Patient is here to follow up regarding headache disorder.  The last visit was done virtually in March 2021.  The plan was to follow-up in 6 months, but the patient did not come until now.  Please refer to my initial/other prior notes for further information.    Since the last visit, the patient reports that his headaches recurred after he ran out of nortriptyline in January 2022.  Now, he has daily headaches and takes naproxen and other over-the-counter analgesics almost every day.  He feels that his neck pain is stable.  Denies interval development of new focal neurological symptoms.    At the same time, the patient reports bilateral lower extremity muscle pain.  He states that he sustained work-related injury of the right knee, and was discovered to have quite advanced bilateral knee arthritis.  Now, when he walks for a prolonged time, he feels bilateral muscle pain.  He is unsure if it is related to excessive tensing of his muscles due to knee pain or other conditions.  EXAM:                                                    Physical Exam:   Vitals: BP (!) 149/90   Pulse 76   Ht 1.93 m (6' 4\")   Wt 114.2 kg (251 lb 12.8 oz)   SpO2 98%   BMI 30.65 kg/m      General: pt is in NAD, cooperative.  Skin: normal turgor, " moist mucous membranes, no lesions/rashes noticed.  HEENT: ATNC, white sclera, normal conjunctiva.  Respiratory: Symmetric lung excursion, no accessory respiratory muscle use.  Abdomen: Non distended.  Neurological: awake, cooperative, follows commands, no aphasia or dysarthria noted, cranial nerves II-XII: no ptosis, face is symmetric, equally moves all extremities, deep tendon reflexes of both lower extremities are equal, pinprick sensation is normal in both lower extremities, no dysmetria bilaterally, casual gait is normal.  ASSESSMENT AND PLAN:                                                    Assessment: 51 year old male patient presents for follow-up of multifactorial headaches.      He reports daily headache recurrence after running out of nortriptyline, which I think we need to restart.  I provided him a new prescription.  We discussed that he might have a component of medication overuse headache and possibly cervicogenic headaches.  We talked about importance of regular follow-up to prevent headache recurrence.  Previously we reviewed that nortriptyline dose could be further increased.  Additional future treatment options include Effexor, Topamax, Depakote, gabapentin, propranolol, and verapamil.    Neck pain is stable.  No changes in management.    Muscle pain could be result of knee arthritis, but if it continues or worsens in the future, we discussed additional work-up that would include CK/LDH/aldolase and possibly other labs in addition to EMG.    Fito to follow up with Primary Care provider regarding elevated blood pressure.     Diagnoses:    ICD-10-CM    1. Tension headache  G44.209 nortriptyline (PAMELOR) 10 MG capsule   2. Cervicalgia  M54.2    3. Medication overuse headache  G44.40    4. Muscle pain  M79.10      Plan: At today's visit we thoroughly discussed current symptoms, available treatment options, and the plan.    We decided to restart nortriptyline at 10 mg every evening to help his  "headaches.  We also discussed that it is important to reduce the use of over-the-counter analgesics to less than 15 days/month to prevent rebound headaches.    Regarding muscle aches in lower extremities, it is possible that they are related to his improper posture/knee arthritis, but if symptoms keep progressing, we could check muscle enzymes and perform EMG to evaluate more.    Advised the patient to keep the headache diary and bring it to the next follow-up visit or upload via My Chart.    Next follow-up appointment is in the next 6 months or earlier if needed.    Total Time: 31 minutes spent on the date of the encounter doing chart review, history and exam, documentation and further activities per the note.    Humphrey Logan MD  [facility] Neurology  (Chart documentation was completed in part with Dragon voice-recognition software. Even though reviewed, some grammatical, spelling, and word errors may remain.)      Fito Frye is a 51 year old male who presents for:  Chief Complaint   Patient presents with     Headache     follow up patient is having daily headaches again since stopping meds         Initial Vitals:  BP (!) 145/94   Pulse 79   Ht 1.93 m (6' 4\")   Wt 114.2 kg (251 lb 12.8 oz)   SpO2 95%   BMI 30.65 kg/m   Estimated body mass index is 30.65 kg/m  as calculated from the following:    Height as of this encounter: 1.93 m (6' 4\").    Weight as of this encounter: 114.2 kg (251 lb 12.8 oz).. Body surface area is 2.47 meters squared. BP completed using cuff size: regular    Nursing Comments: 2nd Set of Vitals: BP- 149/90, Pulse- 76, O2 98%    Patient was advised to follow up with PCP on elevated BP readings     Clary Finch      Again, thank you for allowing me to participate in the care of your patient.        Sincerely,        Humphrey Logan MD    "

## 2022-03-03 NOTE — PROGRESS NOTES
"Fito Frye is a 51 year old male who presents for:  Chief Complaint   Patient presents with     Headache     follow up patient is having daily headaches again since stopping meds         Initial Vitals:  BP (!) 145/94   Pulse 79   Ht 1.93 m (6' 4\")   Wt 114.2 kg (251 lb 12.8 oz)   SpO2 95%   BMI 30.65 kg/m   Estimated body mass index is 30.65 kg/m  as calculated from the following:    Height as of this encounter: 1.93 m (6' 4\").    Weight as of this encounter: 114.2 kg (251 lb 12.8 oz).. Body surface area is 2.47 meters squared. BP completed using cuff size: regular    Nursing Comments: 2nd Set of Vitals: BP- 149/90, Pulse- 76, O2 98%    Patient was advised to follow up with PCP on elevated BP readings     Clary Finch  "

## 2022-03-03 NOTE — PATIENT INSTRUCTIONS
AFTER VISIT SUMMARY (AVS):    At today's visit we thoroughly discussed current symptoms, available treatment options, and the plan.    We decided to restart nortriptyline at 10 mg every evening to help your headaches.  We also discussed that it is important to reduce the use of over-the-counter analgesics to less than 15 days/month to prevent rebound headaches.    Regarding your muscle aches in lower extremities, it is possible that they are related to your improper posture/knee arthritis, but if symptoms keep progressing, we could check your muscle enzymes and perform EMG to evaluate more.    Please keep the headache diary and bring it to the next follow-up visit or upload via My Chart.    Next follow-up appointment is in the next 6 months or earlier if needed.    Please do not hesitate to call me with any questions or concerns.    Thanks.

## 2022-03-03 NOTE — PROGRESS NOTES
"ESTABLISHED PATIENT NEUROLOGY NOTE    DATE OF VISIT: 3/3/2022  CLINIC LOCATION: [facility]  MRN: [id number]  PATIENT NAME: Fito Frye  YOB: 1970    REASON FOR VISIT:   Chief Complaint   Patient presents with     Headache     follow up patient is having daily headaches again since stopping meds      SUBJECTIVE:                                                      HISTORY OF PRESENT ILLNESS: Patient is here to follow up regarding headache disorder.  The last visit was done virtually in March 2021.  The plan was to follow-up in 6 months, but the patient did not come until now.  Please refer to my initial/other prior notes for further information.    Since the last visit, the patient reports that his headaches recurred after he ran out of nortriptyline in January 2022.  Now, he has daily headaches and takes naproxen and other over-the-counter analgesics almost every day.  He feels that his neck pain is stable.  Denies interval development of new focal neurological symptoms.    At the same time, the patient reports bilateral lower extremity muscle pain.  He states that he sustained work-related injury of the right knee, and was discovered to have quite advanced bilateral knee arthritis.  Now, when he walks for a prolonged time, he feels bilateral muscle pain.  He is unsure if it is related to excessive tensing of his muscles due to knee pain or other conditions.  EXAM:                                                    Physical Exam:   Vitals: BP (!) 149/90   Pulse 76   Ht 1.93 m (6' 4\")   Wt 114.2 kg (251 lb 12.8 oz)   SpO2 98%   BMI 30.65 kg/m      General: pt is in NAD, cooperative.  Skin: normal turgor, moist mucous membranes, no lesions/rashes noticed.  HEENT: ATNC, white sclera, normal conjunctiva.  Respiratory: Symmetric lung excursion, no accessory respiratory muscle use.  Abdomen: Non distended.  Neurological: awake, cooperative, follows commands, no aphasia or " dysarthria noted, cranial nerves II-XII: no ptosis, face is symmetric, equally moves all extremities, deep tendon reflexes of both lower extremities are equal, pinprick sensation is normal in both lower extremities, no dysmetria bilaterally, casual gait is normal.  ASSESSMENT AND PLAN:                                                    Assessment: 51 year old male patient presents for follow-up of multifactorial headaches.      He reports daily headache recurrence after running out of nortriptyline, which I think we need to restart.  I provided him a new prescription.  We discussed that he might have a component of medication overuse headache and possibly cervicogenic headaches.  We talked about importance of regular follow-up to prevent headache recurrence.  Previously we reviewed that nortriptyline dose could be further increased.  Additional future treatment options include Effexor, Topamax, Depakote, gabapentin, propranolol, and verapamil.    Neck pain is stable.  No changes in management.    Muscle pain could be result of knee arthritis, but if it continues or worsens in the future, we discussed additional work-up that would include CK/LDH/aldolase and possibly other labs in addition to EMG.    Fito to follow up with Primary Care provider regarding elevated blood pressure.     Diagnoses:    ICD-10-CM    1. Tension headache  G44.209 nortriptyline (PAMELOR) 10 MG capsule   2. Cervicalgia  M54.2    3. Medication overuse headache  G44.40    4. Muscle pain  M79.10      Plan: At today's visit we thoroughly discussed current symptoms, available treatment options, and the plan.    We decided to restart nortriptyline at 10 mg every evening to help his headaches.  We also discussed that it is important to reduce the use of over-the-counter analgesics to less than 15 days/month to prevent rebound headaches.    Regarding muscle aches in lower extremities, it is possible that they are related to his improper posture/knee  arthritis, but if symptoms keep progressing, we could check muscle enzymes and perform EMG to evaluate more.    Advised the patient to keep the headache diary and bring it to the next follow-up visit or upload via My Chart.    Next follow-up appointment is in the next 6 months or earlier if needed.    Total Time: 31 minutes spent on the date of the encounter doing chart review, history and exam, documentation and further activities per the note.    Humphrey Logan MD  [facility]  (Chart documentation was completed in part with Dragon voice-recognition software. Even though reviewed, some grammatical, spelling, and word errors may remain.)

## 2022-05-19 ENCOUNTER — MYC MEDICAL ADVICE (OUTPATIENT)
Dept: FAMILY MEDICINE | Facility: CLINIC | Age: 52
End: 2022-05-19
Payer: COMMERCIAL

## 2022-05-19 NOTE — TELEPHONE ENCOUNTER
"Dr. Szymanski-   1. Please advise if patient's hypertension can be addressed in a virtual visit?  Otherwise, patient will be asked to schedule an office visit with any available medical provider, given time sensitive nature   2. Patient's last visit with you was a virtual visit on 7/14/20  3. You last evaluated patient in an office visit on 3/23/15    \"Hi Dr Szymanski I had a question about my blood pressure right now it's been 148 over 90 and it's been that way for a while. 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  LENNOX BernalN, RN  [facility]    "

## 2022-05-23 NOTE — TELEPHONE ENCOUNTER
Writer responded via [employer].    LENNOX BernalN, RN  [facility]

## 2022-06-02 ENCOUNTER — VIRTUAL VISIT (OUTPATIENT)
Dept: FAMILY MEDICINE | Facility: CLINIC | Age: 52
End: 2022-06-02
Payer: COMMERCIAL

## 2022-06-02 DIAGNOSIS — Z13.220 LIPID SCREENING: ICD-10-CM

## 2022-06-02 DIAGNOSIS — I10 BENIGN ESSENTIAL HYPERTENSION: Primary | ICD-10-CM

## 2022-06-02 PROCEDURE — 99214 OFFICE O/P EST MOD 30 MIN: CPT | Mod: GT | Performed by: FAMILY MEDICINE

## 2022-06-02 RX ORDER — HYDROCHLOROTHIAZIDE 12.5 MG/1
12.5 TABLET ORAL DAILY
Qty: 30 TABLET | Refills: 1 | Status: SHIPPED | OUTPATIENT
Start: 2022-06-02 | End: 2022-07-28

## 2022-06-02 NOTE — PROGRESS NOTES
"Fito is a 51 year old who is being evaluated via a billable video visit.       Video Start Time: 11:34 AM    Assessment & Plan     Benign essential hypertension  Labs as below to evaluate for secondary hypertension or sequela tension.  We will plan on EKG when comes in for preventive visit    We will start hydrochlorothiazide 12.5 mg daily.  He will follow-up with me in 2 weeks after an additional lab appointment about 2 to 3 days before that.  He will continue to monitor home blood pressures and record those so we can discuss at his next visit  - Comprehensive metabolic panel (BMP + Alb, Alk Phos, ALT, AST, Total. Bili, TP)  - CBC with platelets  - UA with Microscopic - lab collect  - Albumin Random Urine Quantitative with Creat Ratio  - TSH with free T4 reflex  - hydrochlorothiazide (HYDRODIURIL) 12.5 MG tablet  Dispense: 30 tablet; Refill: 1  - **Basic metabolic panel FUTURE 14d    Lipid screening     - Lipid panel reflex to direct LDL Fasting            BMI:   Estimated body mass index is 30.65 kg/m  as calculated from the following:    Height as of 3/3/22: 1.93 m (6' 4\").    Weight as of 3/3/22: 114.2 kg (251 lb 12.8 oz).        Patient Instructions     1) Schedule lab visit  2) Schedule preventive visit with me in a few months.   3) start the hydrochlorothiazide 12.5mg daily right away and continue to measure your home blood pressure  4) Schedule another lab visit in two weeks with a video visit with me 2-3 days later.  Let me know if any concerns arise in the meantime.       Dietary Approaches to Stop Hypertension (The DASH Diet)   What is hypertension?   Hypertension is the term for blood pressure that is consistently higher than normal. Blood pressure is the force of blood against artery walls. Blood pressure can be unhealthy if it is above 120/80. The higher your blood pressure, the greater the health risk.     High blood pressure can be controlled if you take these steps:     Maintain a healthy weight. "     Be physically active.     Follow a healthy eating plan, which includes foods lower in salt and sodium.     If you drink alcoholic beverages, do so in moderation.   As noted in this list, diet affects high blood pressure. Following the DASH diet and reducing the amount of sodium in your diet will help lower your blood pressure. It will also help prevent high blood pressure.     What is the DASH diet?   Dietary Approaches to Stop Hypertension (DASH) is a diet that is low in saturated fat, cholesterol, and total fat. It emphasizes fruits, vegetables, and low-fat dairy foods. The DASH diet also includes whole-grain products, fish, poultry, and nuts. It encourages fewer servings of red meat, sweets, and sugar-containing beverages. It is rich in magnesium, potassium, and calcium, as well as protein and fiber.     How do I get started on the DASH diet?   The DASH diet requires no special foods and has no hard-to-follow recipes. Start by seeing how DASH compares with your current eating habits.  The DASH eating plan shown is based on 2,000 calories a day. Your health care provider or a dietitian can help you determine how many calories a day you need. Most adults need somewhere between 1600 and 2800 calories a day. Serving sizes will vary between 1/2 cup and 1 1/4 cups.     Check the product's nutrition label to determine serving sizes of particular products.    Food Group   Number of Servings   Examples of Serving Size    Grains and grain products   7 to 8   1 slice of bread    1 cup ready-to-eat cold cereal    1/2 cup cooked rice, pasta, or   cereal    Vegetables   4 to 5   1 cup raw leafy vegetable    1/2 cup cooked vegetable    6 oz. vegetable juice    Fruits   4 to 5   1 medium fruit       1/4 cup dried fruit    1/2 cup fresh, frozen, or canned fruit    6 oz fruit juice    Low-fat or fat-free dairy foods   2 to 3   8 oz milk    1 cup yogurt    1 1/2 oz cheese    Lean meats, poultry, or fish   2 or fewer   3 oz  cooked lean meat, skinless poultry, or fish    Nuts, seeds, and dry beans   4 to 5 per week   1/3 cup or 1 1/2 oz nuts    1 tablespoon or 1/2 oz seeds    1/2 cup cooked dry beans     Fats and oils   2 to 3   1 teaspoon soft margarine    1 tablespoon low-fat mayonnaise    2 tablespoons light salad dressing    1 teaspoon vegetable oil   Sweets   5 per week   1 tablespoon sugar              8 oz lemonade    1 tablespoon jelly or jam     1/2 oz jelly beans     Make changes gradually. Here are some suggestions that might help:       If you now eat 1 or 2 servings of vegetables a day, add a serving at lunch and another at dinner.     If you don't eat fruit now or have only juice at breakfast, add a serving to your meals or have it as a snack.     Drink milk or water with lunch or dinner instead of soda, sugar-sweetened tea, or alcohol. Choose low-fat (1%) or fat-free (skim) dairy products to reduce how much saturated fat, total fat, cholesterol, and calories you eat. If you have trouble digesting dairy products, try taking lactase enzyme pills or drops (available at drugstores and groceries) with the dairy foods. Or buy lactose-free milk or milk with lactase enzyme added to it.     Read food labels on margarines and salad dressings to choose products lowest in fat.     If you now eat large portions of meat, cut back gradually--by a half or a third at each meal. Limit meat to 6 ounces a day (2 servings). Three to four ounces is about the size of a deck of cards.     Have 2 or more vegetarian-style (meatless) meals each week. Increase servings of vegetables, rice, pasta, and beans in all meals. Try casseroles and pasta, and stir-steinberg dishes, which have less meat and more vegetables, grains, and beans.     Use fruits canned in their own juice. Fresh fruits require little or no preparation. Dried fruits are a good choice to carry with you or to have ready in the car.     Try these snacks ideas: unsalted pretzels or nuts mixed  with raisins, carol crackers, low-fat and fat-free yogurt and frozen yogurt, popcorn with no salt or butter added, and raw vegetables.     Choose whole grain foods to get more nutrients, including minerals and fiber. For example, choose whole-wheat bread or whole-grain cereals.     Use fresh, frozen, or no-salt-added canned vegetables.     Remember to also reduce the salt and sodium in your diet. Try to have no more than 2000 milligrams (mg) of sodium per day, with a goal of further reducing the sodium to 1500 mg per day. Three important ways to reduce sodium are:       Use reduced-sodium or no-salt-added food products.     Use less salt when you prepare foods and do not add salt to your food at the table.     Read fool labels. Aim for foods that are less than 5 percent of the daily value of sodium.     The DASH eating plan was not designed for weight loss. But it contains many lower calorie foods, such as fruits and vegetables. You can make it lower in calories by replacing higher calorie foods with more fruits and vegetables. Some ideas to increase fruits and vegetables and decrease calories include:       Eat a medium apple instead of four shortbread cookies. You'll save 80 calories.     Eat 1/4 cup of dried apricots instead of a 2-ounce bag of pork rinds. You'll save 230 calories.     Have a hamburger that's 3 ounces instead of 6 ounces. Add a 1/2 cup serving of carrots and a 1/2 cup serving of spinach. You'll save more than 200 calories.     Instead of 5 ounces of chicken, have a stir steinberg with 2 ounces of chicken and 1 and 1/2 cups of raw vegetables. Use a small amount of vegetable oil. You'll save 50 calories.     Have a 1/2 cup serving of low-fat frozen yogurt instead of a 1 and 1/2 ounce milk chocolate bar. You'll save about 110 calories.     Use low-fat or fat-free condiments, such as fat free salad dressings.     Eat smaller portions--cut back gradually.     Use food labels to compare fat content in  packaged foods. Items marked low-fat or fat-free may be lower in fat without being lower in calories than their regular versions.     Limit foods with lots of added sugar, such as pies, flavored yogurts, candy bars, ice cream, sherbet, regular soft drinks, and fruit drinks.     Drink water or club soda instead of cola or other soda drinks.     Based on National Institutes of Health Guidelines. Published by [employer].   Copyright   2004 [employer] and/or one of its subsidiaries. All Rights Reserved.         Return in about 2 weeks (around 6/16/2022) for  , Follow up, using a video visit .    Mona Szymanski MD   [facility]    Berkley Payton is a 51 year old who presents for the following health issues     History of Present Illness       Reason for visit:  High blood pressure  Symptom onset:  More than a month  Symptoms include:  Not sure  Symptom intensity:  Mild  Symptom progression:  Staying the same  Had these symptoms before:  No  What makes it worse:  No  What makes it better:  No    He eats 2-3 servings of fruits and vegetables daily.He consumes 0 sweetened beverage(s) daily.He exercises with enough effort to increase his heart rate 9 or less minutes per day.  He exercises with enough effort to increase his heart rate 3 or less days per week. He is missing 1 dose(s) of medications per week.  He is not taking prescribed medications regularly due to remembering to take.     He sent a [email or web address] message on 5/19/2022 noting that his home blood pressure was 148/90 and its been high file.  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    Checks his bp every couple of months. It is an arm cuff. He sees 80s-90 diastolic and the systolic is 140s.     Of note, he has been having significant knee pain with knee osteoarthritis and has been taking ibuprofen or naproxen but about twice a week or less.      Snores a little, but not significant. Denies witnessed apnea. Feels refreshed when he awakens if his knees haven't bothered him at night.     Has fhx HTN in his dad for years.      Review of Systems    ROS:  E: NEGATIVE for vision changes   R: NEGATIVE for SOB  CV: NEGATIVE for chest pain   N: NEGATIVE for weakness, dizziness or paresthesias or headache       Objective       BP Readings from Last 6 Encounters:   03/03/22 (!) 149/90   11/05/20 136/87   08/10/20 124/87   07/31/20 110/74   07/11/19 (!) 141/96   04/24/15 134/87         Vitals:  No vitals were obtained today due to virtual visit.    Physical Exam   GENERAL: Healthy, alert and no distress  EYES: Eyes grossly normal to inspection.  No discharge or erythema, or obvious scleral/conjunctival abnormalities.  RESP: No audible wheeze, cough, or visible cyanosis.  No visible retractions or increased work of breathing.    SKIN: Visible skin clear. No significant rash, abnormal pigmentation or lesions.  NEURO: Cranial nerves grossly intact.  Mentation and speech appropriate for age.  PSYCH: Mentation appears normal, affect normal/bright, judgement and insight intact, normal speech and appearance well-groomed.                  Video-Visit Details    Type of service:  Video Visit    Video End Time:11:51 AM    Originating Location (pt. Location): Home    Distant Location (provider location):  [facility]     Platform used for Video Visit: [employer]

## 2022-06-02 NOTE — PATIENT INSTRUCTIONS
1) Schedule lab visit  2) Schedule preventive visit with me in a few months.   3) start the hydrochlorothiazide 12.5mg daily right away and continue to measure your home blood pressure  4) Schedule another lab visit in two weeks with a video visit with me 2-3 days later.  Let me know if any concerns arise in the meantime.       Dietary Approaches to Stop Hypertension (The DASH Diet)   What is hypertension?   Hypertension is the term for blood pressure that is consistently higher than normal. Blood pressure is the force of blood against artery walls. Blood pressure can be unhealthy if it is above 120/80. The higher your blood pressure, the greater the health risk.     High blood pressure can be controlled if you take these steps:   Maintain a healthy weight.   Be physically active.   Follow a healthy eating plan, which includes foods lower in salt and sodium.   If you drink alcoholic beverages, do so in moderation.   As noted in this list, diet affects high blood pressure. Following the DASH diet and reducing the amount of sodium in your diet will help lower your blood pressure. It will also help prevent high blood pressure.     What is the DASH diet?   Dietary Approaches to Stop Hypertension (DASH) is a diet that is low in saturated fat, cholesterol, and total fat. It emphasizes fruits, vegetables, and low-fat dairy foods. The DASH diet also includes whole-grain products, fish, poultry, and nuts. It encourages fewer servings of red meat, sweets, and sugar-containing beverages. It is rich in magnesium, potassium, and calcium, as well as protein and fiber.     How do I get started on the DASH diet?   The DASH diet requires no special foods and has no hard-to-follow recipes. Start by seeing how DASH compares with your current eating habits.  The DASH eating plan shown is based on 2,000 calories a day. Your health care provider or a dietitian can help you determine how many calories a day you need. Most adults need  somewhere between 1600 and 2800 calories a day. Serving sizes will vary between 1/2 cup and 1 1/4 cups.     Check the product's nutrition label to determine serving sizes of particular products.    Food Group   Number of Servings   Examples of Serving Size    Grains and grain products   7 to 8   1 slice of bread    1 cup ready-to-eat cold cereal    1/2 cup cooked rice, pasta, or   cereal    Vegetables   4 to 5   1 cup raw leafy vegetable    1/2 cup cooked vegetable    6 oz. vegetable juice    Fruits   4 to 5   1 medium fruit       1/4 cup dried fruit    1/2 cup fresh, frozen, or canned fruit    6 oz fruit juice    Low-fat or fat-free dairy foods   2 to 3   8 oz milk    1 cup yogurt    1 1/2 oz cheese    Lean meats, poultry, or fish   2 or fewer   3 oz cooked lean meat, skinless poultry, or fish    Nuts, seeds, and dry beans   4 to 5 per week   1/3 cup or 1 1/2 oz nuts    1 tablespoon or 1/2 oz seeds    1/2 cup cooked dry beans     Fats and oils   2 to 3   1 teaspoon soft margarine    1 tablespoon low-fat mayonnaise    2 tablespoons light salad dressing    1 teaspoon vegetable oil   Sweets   5 per week   1 tablespoon sugar              8 oz lemonade    1 tablespoon jelly or jam     1/2 oz jelly beans     Make changes gradually. Here are some suggestions that might help:     If you now eat 1 or 2 servings of vegetables a day, add a serving at lunch and another at dinner.   If you don't eat fruit now or have only juice at breakfast, add a serving to your meals or have it as a snack.   Drink milk or water with lunch or dinner instead of soda, sugar-sweetened tea, or alcohol. Choose low-fat (1%) or fat-free (skim) dairy products to reduce how much saturated fat, total fat, cholesterol, and calories you eat. If you have trouble digesting dairy products, try taking lactase enzyme pills or drops (available at drugstores and groceries) with the dairy foods. Or buy lactose-free milk or milk with lactase enzyme added to it.    Read food labels on margarines and salad dressings to choose products lowest in fat.   If you now eat large portions of meat, cut back gradually--by a half or a third at each meal. Limit meat to 6 ounces a day (2 servings). Three to four ounces is about the size of a deck of cards.   Have 2 or more vegetarian-style (meatless) meals each week. Increase servings of vegetables, rice, pasta, and beans in all meals. Try casseroles and pasta, and stir-steinberg dishes, which have less meat and more vegetables, grains, and beans.   Use fruits canned in their own juice. Fresh fruits require little or no preparation. Dried fruits are a good choice to carry with you or to have ready in the car.   Try these snacks ideas: unsalted pretzels or nuts mixed with raisins, carol crackers, low-fat and fat-free yogurt and frozen yogurt, popcorn with no salt or butter added, and raw vegetables.   Choose whole grain foods to get more nutrients, including minerals and fiber. For example, choose whole-wheat bread or whole-grain cereals.   Use fresh, frozen, or no-salt-added canned vegetables.     Remember to also reduce the salt and sodium in your diet. Try to have no more than 2000 milligrams (mg) of sodium per day, with a goal of further reducing the sodium to 1500 mg per day. Three important ways to reduce sodium are:     Use reduced-sodium or no-salt-added food products.   Use less salt when you prepare foods and do not add salt to your food at the table.   Read fool labels. Aim for foods that are less than 5 percent of the daily value of sodium.     The DASH eating plan was not designed for weight loss. But it contains many lower calorie foods, such as fruits and vegetables. You can make it lower in calories by replacing higher calorie foods with more fruits and vegetables. Some ideas to increase fruits and vegetables and decrease calories include:     Eat a medium apple instead of four shortbread cookies. You'll save 80 calories.   Eat  1/4 cup of dried apricots instead of a 2-ounce bag of pork rinds. You'll save 230 calories.   Have a hamburger that's 3 ounces instead of 6 ounces. Add a 1/2 cup serving of carrots and a 1/2 cup serving of spinach. You'll save more than 200 calories.   Instead of 5 ounces of chicken, have a stir steinberg with 2 ounces of chicken and 1 and 1/2 cups of raw vegetables. Use a small amount of vegetable oil. You'll save 50 calories.   Have a 1/2 cup serving of low-fat frozen yogurt instead of a 1 and 1/2 ounce milk chocolate bar. You'll save about 110 calories.   Use low-fat or fat-free condiments, such as fat free salad dressings.   Eat smaller portions--cut back gradually.   Use food labels to compare fat content in packaged foods. Items marked low-fat or fat-free may be lower in fat without being lower in calories than their regular versions.   Limit foods with lots of added sugar, such as pies, flavored yogurts, candy bars, ice cream, sherbet, regular soft drinks, and fruit drinks.   Drink water or club soda instead of cola or other soda drinks.     Based on National Institutes of Health Guidelines. Published by [employer].   Copyright   2004 [employer] and/or one of its subsidiaries. All Rights Reserved.

## 2022-06-09 ENCOUNTER — LAB (OUTPATIENT)
Dept: LAB | Facility: CLINIC | Age: 52
End: 2022-06-09
Payer: COMMERCIAL

## 2022-06-09 DIAGNOSIS — I10 BENIGN ESSENTIAL HYPERTENSION: ICD-10-CM

## 2022-06-09 DIAGNOSIS — Z13.220 LIPID SCREENING: ICD-10-CM

## 2022-06-09 LAB
ALBUMIN SERPL-MCNC: 3.9 G/DL (ref 3.4–5)
ALBUMIN UR-MCNC: NEGATIVE MG/DL
ALP SERPL-CCNC: 96 U/L (ref 40–150)
ALT SERPL W P-5'-P-CCNC: 34 U/L (ref 0–70)
ANION GAP SERPL CALCULATED.3IONS-SCNC: 6 MMOL/L (ref 3–14)
APPEARANCE UR: CLEAR
AST SERPL W P-5'-P-CCNC: 24 U/L (ref 0–45)
BACTERIA #/AREA URNS HPF: ABNORMAL /HPF
BILIRUB SERPL-MCNC: 0.7 MG/DL (ref 0.2–1.3)
BILIRUB UR QL STRIP: NEGATIVE
BUN SERPL-MCNC: 15 MG/DL (ref 7–30)
CALCIUM SERPL-MCNC: 9.2 MG/DL (ref 8.5–10.1)
CHLORIDE BLD-SCNC: 107 MMOL/L (ref 94–109)
CHOLEST SERPL-MCNC: 231 MG/DL
CO2 SERPL-SCNC: 27 MMOL/L (ref 20–32)
COLOR UR AUTO: YELLOW
CREAT SERPL-MCNC: 0.92 MG/DL (ref 0.66–1.25)
CREAT UR-MCNC: 279 MG/DL
ERYTHROCYTE [DISTWIDTH] IN BLOOD BY AUTOMATED COUNT: 11.8 % (ref 10–15)
FASTING STATUS PATIENT QL REPORTED: YES
GFR SERPL CREATININE-BSD FRML MDRD: >90 ML/MIN/1.73M2
GLUCOSE BLD-MCNC: 113 MG/DL (ref 70–99)
GLUCOSE UR STRIP-MCNC: NEGATIVE MG/DL
HCT VFR BLD AUTO: 45.4 % (ref 40–53)
HDLC SERPL-MCNC: 57 MG/DL
HGB BLD-MCNC: 15.2 G/DL (ref 13.3–17.7)
HGB UR QL STRIP: NEGATIVE
KETONES UR STRIP-MCNC: NEGATIVE MG/DL
LDLC SERPL CALC-MCNC: 152 MG/DL
LEUKOCYTE ESTERASE UR QL STRIP: NEGATIVE
MCH RBC QN AUTO: 28.9 PG (ref 26.5–33)
MCHC RBC AUTO-ENTMCNC: 33.5 G/DL (ref 31.5–36.5)
MCV RBC AUTO: 86 FL (ref 78–100)
MICROALBUMIN UR-MCNC: 18 MG/L
MICROALBUMIN/CREAT UR: 6.45 MG/G CR (ref 0–17)
NITRATE UR QL: NEGATIVE
NONHDLC SERPL-MCNC: 174 MG/DL
PH UR STRIP: 6 [PH] (ref 5–7)
PLATELET # BLD AUTO: 267 10E3/UL (ref 150–450)
POTASSIUM BLD-SCNC: 4.1 MMOL/L (ref 3.4–5.3)
PROT SERPL-MCNC: 7.7 G/DL (ref 6.8–8.8)
RBC # BLD AUTO: 5.26 10E6/UL (ref 4.4–5.9)
RBC #/AREA URNS AUTO: ABNORMAL /HPF
SODIUM SERPL-SCNC: 140 MMOL/L (ref 133–144)
SP GR UR STRIP: 1.01 (ref 1–1.03)
TRIGL SERPL-MCNC: 110 MG/DL
TSH SERPL DL<=0.005 MIU/L-ACNC: 1.27 MU/L (ref 0.4–4)
UROBILINOGEN UR STRIP-ACNC: 0.2 E.U./DL
WBC # BLD AUTO: 4.3 10E3/UL (ref 4–11)
WBC #/AREA URNS AUTO: ABNORMAL /HPF

## 2022-06-09 PROCEDURE — 81001 URINALYSIS AUTO W/SCOPE: CPT

## 2022-06-09 PROCEDURE — 36415 COLL VENOUS BLD VENIPUNCTURE: CPT

## 2022-06-09 PROCEDURE — 85027 COMPLETE CBC AUTOMATED: CPT

## 2022-06-09 PROCEDURE — 80061 LIPID PANEL: CPT

## 2022-06-09 PROCEDURE — 82043 UR ALBUMIN QUANTITATIVE: CPT

## 2022-06-09 PROCEDURE — 80053 COMPREHEN METABOLIC PANEL: CPT

## 2022-06-09 PROCEDURE — 84443 ASSAY THYROID STIM HORMONE: CPT

## 2022-06-09 NOTE — LETTER
July 1, 2022      Fito Frye  [address]  [facility] [address]        Dear ,      We are writing to inform you of your test results. The results of your recent lipid (cholesterol) profile were abnormal.     Here are the results:   Lab Results        Component                Value               Date                        CHOL                     231                 06/09/2022                  CHOL                     209                 07/11/2019             Lab Results        Component                Value               Date                        HDL                      57                  06/09/2022                  HDL                      64                  07/11/2019             Lab Results        Component                Value               Date                        LDL                      152                 06/09/2022                  LDL                      128                 07/11/2019             Lab Results        Component                Value               Date                        TRIG                     110                 06/09/2022                  TRIG                     97                  07/11/2019             No results found for: CHOLHDLRATIO     Desired or goal levels are:   CHOLESTEROL: Desirable is less than 200.   HDL (Good Cholesterol): Desirable is greater than 40 (for men) greater than 50 (for women).   LDL (Bad Cholesterol): Desirable is less than 130 (or less than 100 if you have heart disease or diabetes). Borderline 130-160.   TRIGLYCERIDES: Desirable is less than 150.  Borderline is 150-200.       To help lower your LDL (bad cholesterol) you could start adding ground flax seed to your diet. The recommended dose is 2 heaping tablespoonfuls daily, you may want to start with 1 tablespoonful and increase to 2 heaping tablespoonfuls. You can mix or add ground flax seed to hot cereals, yogurt, applesauce, cottage cheese or any sauce mixture  that is your portion. Ground flax seed can be found in the baking aisle near the flour.     As you may know, an elevated cholesterol is one factor that increases your risk for heart disease and stroke. You can improve your cholesterol by controlling the amount and type of fat you eat and by increasing your daily activity level.     Here are some ways to improve your nutrition:   Eat less fat (especially butter, Crisco and other saturated fats)   Buy lean cuts of meat, reduce your portions of red meat or substitute poultry or fish   Use skim milk and low-fat dairy products   Eat no more than 4 egg yolks per week   Avoid fried or fast foods that are high in fat   Eat more fruits and vegetables     Also consider starting or increasing your aerobic activity. Aerobic activity is the best way to improve HDL (good) cholesterol. If this would be new to you, please talk with me first about what activities are safe for you.     Other lab results:     Your blood glucose (blood sugar) was also slightly elevated. You do not have diabetes yet but we consider this a pre-diabetic state.   I would recommend rechecking your blood glucose in one year. Improving lifestyle habits such as regular exercise, good diet and weight loss will hopefully slow or stop the progression towards diabetes.     The rest of your lab results were in reasonable range.     Please remember to schedule a follow up lab visit for the hydrochlorothiazide and a virtual visit with me a few days after that.     Please feel free to contact us with any questions or if you would like more information.       Mona Szymanski M.D.       Resulted Orders   Comprehensive metabolic panel (BMP + Alb, Alk Phos, ALT, AST, Total. Bili, TP)   Result Value Ref Range    Sodium 140 133 - 144 mmol/L    Potassium 4.1 3.4 - 5.3 mmol/L    Chloride 107 94 - 109 mmol/L    Carbon Dioxide (CO2) 27 20 - 32 mmol/L    Anion Gap 6 3 - 14 mmol/L    Urea Nitrogen 15 7 - 30 mg/dL    Creatinine 0.92  0.66 - 1.25 mg/dL    Calcium 9.2 8.5 - 10.1 mg/dL    Glucose 113 (H) 70 - 99 mg/dL    Alkaline Phosphatase 96 40 - 150 U/L    AST 24 0 - 45 U/L    ALT 34 0 - 70 U/L    Protein Total 7.7 6.8 - 8.8 g/dL    Albumin 3.9 3.4 - 5.0 g/dL    Bilirubin Total 0.7 0.2 - 1.3 mg/dL    GFR Estimate >90 >60 mL/min/1.73m2      Comment:      Effective December 21, 2021 eGFRcr in adults is calculated using the 2021 CKD-EPI creatinine equation which includes age and gender (Garret et al., [place], DOI: 10.1056/LUXUni1750520)   CBC with platelets   Result Value Ref Range    WBC Count 4.3 4.0 - 11.0 10e3/uL    RBC Count 5.26 4.40 - 5.90 10e6/uL    Hemoglobin 15.2 13.3 - 17.7 g/dL    Hematocrit 45.4 40.0 - 53.0 %    MCV 86 78 - 100 fL    MCH 28.9 26.5 - 33.0 pg    MCHC 33.5 31.5 - 36.5 g/dL    RDW 11.8 10.0 - 15.0 %    Platelet Count 267 150 - 450 10e3/uL   UA with Microscopic - lab collect   Result Value Ref Range    Color Urine Yellow Colorless, Straw, Light Yellow, Yellow    Appearance Urine Clear Clear    Glucose Urine Negative Negative mg/dL    Bilirubin Urine Negative Negative    Ketones Urine Negative Negative mg/dL    Specific Gravity Urine 1.010 1.003 - 1.035    Blood Urine Negative Negative    pH Urine 6.0 5.0 - 7.0    Protein Albumin Urine Negative Negative mg/dL    Urobilinogen Urine 0.2 0.2, 1.0 E.U./dL    Nitrite Urine Negative Negative    Leukocyte Esterase Urine Negative Negative   Albumin Random Urine Quantitative with Creat Ratio   Result Value Ref Range    Creatinine Urine mg/dL 279 mg/dL    Albumin Urine mg/L 18 mg/L    Albumin Urine mg/g Cr 6.45 0.00 - 17.00 mg/g Cr   TSH with free T4 reflex   Result Value Ref Range    TSH 1.27 0.40 - 4.00 mU/L   Lipid panel reflex to direct LDL Fasting   Result Value Ref Range    Cholesterol 231 (H) <200 mg/dL    Triglycerides 110 <150 mg/dL    Direct Measure HDL 57 >=40 mg/dL    LDL Cholesterol Calculated 152 (H) <=100 mg/dL    Non HDL Cholesterol 174 (H) <130 mg/dL    Patient Fasting  > 8hrs? Yes     Narrative    Cholesterol  Desirable:  <200 mg/dL    Triglycerides  Normal:  Less than 150 mg/dL  Borderline High:  150-199 mg/dL  High:  200-499 mg/dL  Very High:  Greater than or equal to 500 mg/dL    Direct Measure HDL  Female:  Greater than or equal to 50 mg/dL   Male:  Greater than or equal to 40 mg/dL    LDL Cholesterol  Desirable:  <100mg/dL  Above Desirable:  100-129 mg/dL   Borderline High:  130-159 mg/dL   High:  160-189 mg/dL   Very High:  >= 190 mg/dL    Non HDL Cholesterol  Desirable:  130 mg/dL  Above Desirable:  130-159 mg/dL  Borderline High:  160-189 mg/dL  High:  190-219 mg/dL  Very High:  Greater than or equal to 220 mg/dL   Urine Microscopic Exam   Result Value Ref Range    Bacteria Urine Few (A) None Seen /HPF    RBC Urine None Seen 0-2 /HPF /HPF    WBC Urine None Seen 0-5 /HPF /HPF       If you have any questions or concerns, please call the clinic at the number listed above.       Sincerely,      Mona Szymanski MD

## 2022-06-21 NOTE — RESULT ENCOUNTER NOTE
The results of your recent lipid (cholesterol) profile were abnormal.    Here are the results:  Lab Results       Component                Value               Date                       CHOL                     231                 06/09/2022                 CHOL                     209                 07/11/2019            Lab Results       Component                Value               Date                       HDL                      57                  06/09/2022                 HDL                      64                  07/11/2019            Lab Results       Component                Value               Date                       LDL                      152                 06/09/2022                 LDL                      128                 07/11/2019            Lab Results       Component                Value               Date                       TRIG                     110                 06/09/2022                 TRIG                     97                  07/11/2019            No results found for: CHOLHDLRATIO    Desired or goal levels are:  CHOLESTEROL: Desirable is less than 200.   HDL (Good Cholesterol): Desirable is greater than 40 (for men) greater than 50 (for women).  LDL (Bad Cholesterol): Desirable is less than 130 (or less than 100 if you have heart disease or diabetes). Borderline 130-160.  TRIGLYCERIDES: Desirable is less than 150.  Borderline is 150-200.    To help lower your LDL (bad cholesterol) you could start adding ground flax seed to your diet. The recommended dose is 2 heaping tablespoonfuls daily, you may want to start with 1 tablespoonful and increase to 2 heaping tablespoonfuls. You can mix or add ground flax seed to hot cereals, yogurt, applesauce, cottage cheese or any sauce mixture that is your portion. Ground flax seed can be found in the baking aisle near the flour.   .    As you may know, an elevated cholesterol is one factor that increases your risk for heart  disease and stroke. You can improve your cholesterol by controlling the amount and type of fat you eat and by increasing your daily activity level.    Here are some ways to improve your nutrition:  Eat less fat (especially butter, Crisco and other saturated fats)  Buy lean cuts of meat, reduce your portions of red meat or substitute poultry or fish  Use skim milk and low-fat dairy products  Eat no more than 4 egg yolks per week  Avoid fried or fast foods that are high in fat  Eat more fruits and vegetables      Also consider starting or increasing your aerobic activity. Aerobic activity is the best way to improve HDL (good) cholesterol. If this would be new to you, please talk with me first about what activities are safe for you.      Other lab results:    Your blood glucose (blood sugar) was also slightly elevated. You do not have diabetes yet but we consider this a pre-diabetic state.  I would recommend rechecking your blood glucose in one year. Improving lifestyle habits such as regular exercise, good diet and weight loss will hopefully slow or stop the progression towards diabetes.     The rest of your lab results were in reasonable range.     Please remember to schedule a follow up lab visit for the hydrochlorothiazide and a virtual visit with me a few days after that.     Please feel free to contact us with any questions or if you would like more information.      Mona Szymanski M.D.

## 2022-06-30 ENCOUNTER — VIRTUAL VISIT (OUTPATIENT)
Dept: FAMILY MEDICINE | Facility: CLINIC | Age: 52
End: 2022-06-30
Payer: COMMERCIAL

## 2022-06-30 DIAGNOSIS — R07.89 ATYPICAL CHEST PAIN: ICD-10-CM

## 2022-06-30 DIAGNOSIS — I10 BENIGN ESSENTIAL HYPERTENSION: Primary | ICD-10-CM

## 2022-06-30 DIAGNOSIS — R06.02 SHORTNESS OF BREATH: ICD-10-CM

## 2022-06-30 PROCEDURE — 99214 OFFICE O/P EST MOD 30 MIN: CPT | Mod: GT | Performed by: FAMILY MEDICINE

## 2022-06-30 RX ORDER — ALBUTEROL SULFATE 90 UG/1
1-2 AEROSOL, METERED RESPIRATORY (INHALATION) EVERY 6 HOURS PRN
Qty: 18 G | Refills: 1 | Status: SHIPPED | OUTPATIENT
Start: 2022-06-30 | End: 2023-05-30

## 2022-06-30 NOTE — PROGRESS NOTES
Chief Complaint: Establish general cardiovascular care    HPI: Fito Frye is a 51 year old male with a past medical history of hypertension who was referred to me for evaluation of hypertension and atypical chest pain by Dr. Mona Szymanski.     Briefly, Mr. Frye follow-up with Dr. Szymanski, his primary care clinician for evaluation of exertional chest pain.  He notes that the first episode of this was in 2007, when he was 18 primary fine.  At the time, he was having chest tightness or squeezing and underwent a stress echocardiogram which was unremarkable.  Since then, he has had intermittent episodes both at rest and with activity. Mr. Frye is able to walk over a mile without symptoms. Mr. Frye also notes occasional peripheral edema and weight gain the last few months.    Mr. Frye visited Dr. Szymanski yesterday to follow-up his blood pressure.  Although his blood pressure exhibited better control, he  mentioned that he was having increasingly frequent chest tightness.  Today, he notes that this has been occurring daily for at least a year.  Prior to that, it was occurring less frequently but never really stopped since his after mentioned stress test.  The chest tightness has a central thoracic location with radiation to his arms.  However, there was no exertional component to the symptoms.  This has also been accompanied by exertional dyspnea and postural dizziness.    At the time of the consultation, he notes an absence of chest pain at rest or dyspnea at rest.  Mr. Frye said he drinks a total of 1 to 2 L of fluid daily with 1-2 beers.  He is not take any energy drinks/supplements.  He feels that his chief complaint is the postural dizziness that has been evident for the last few weeks during the heat wave.  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    A comprehensive ROS was done and the details are  included above in the HPI.    Past Medical History:  - Hypertension  - No prior history of  T2DM, dyslipidemia, CAD, TIA/stroke, vascular aneurysms, PAD  Past Medical History:   Diagnosis Date     NO ACTIVE PROBLEMS        Past Surgical History:    Past Surgical History:   Procedure Laterality Date     [facility] ECHO HEART XTHORACIC, STRESS/REST  2007    Negative       Drug History:  Home cardiac meds: Hydrochlorothiazide 12.5 mg once daily  Current Outpatient Medications   Medication Sig Dispense Refill     Acetaminophen (TYLENOL PO)        hydrochlorothiazide (HYDRODIURIL) 12.5 MG tablet Take 1 tablet (12.5 mg) by mouth daily 30 tablet 1     nortriptyline (PAMELOR) 10 MG capsule Take 1 capsule (10 mg) by mouth At Bedtime 91 capsule 1     VITAMIN D OR 1 tablet daily       albuterol (PROAIR HFA/PROVENTIL HFA/VENTOLIN HFA) 108 (90 Base) MCG/ACT inhaler Inhale 1-2 puffs into the lungs every 6 hours as needed for shortness of breath / dyspnea or wheezing (Patient not taking: Reported on 2022) 18 g 1         Family History:  - Brother with MI at age 55   - Mother  from MI ate ge 70   - Father with MI in 60s and PCI   - No family history of valvular disorders  Family History   Problem Relation Age of Onset     Myocardial Infarction Mother 70         of MI     Breast Cancer Mother 50     Coronary Artery Disease Father 65        in his 60s     Diabetes Type 2  Father      Sleep Apnea Father         diagnosed in mid 70's     Multiple myeloma Father 78     No Known Problems Sister      Myocardial Infarction Brother 55       Social History:  - 15 drinks/week   Social History     Tobacco Use     Smoking status: Never Smoker     Smokeless tobacco: Never Used   Substance Use Topics     Alcohol use: Yes     Comment: usually a couple beers after work daily       Allergies   Allergen Reactions     No Known Drug Allergies      Seasonal Allergies          Physical Examination:  Vitals: /86 (BP Location: Right arm,  "Patient Position: Chair, Cuff Size: Adult Regular)   Pulse 78   Ht 1.935 m (6' 4.2\")   Wt 108.5 kg (239 lb 1.6 oz)   SpO2 98%   BMI 28.95 kg/m    BMI= Body mass index is 28.95 kg/m .    GENERAL: Healthy, alert and no distress  Eyes: No xanthelasmas.  NECK: No palpable neck masses/goitre and no evidence of retrosternal goitre.   ENT: Moist mucosal membranes.  RESPIRATORY: No signs of resp distress. Lungs CTAB.  Chest pain is not reproducible upon palpation of chest wall.  CARDIOVASCULAR:  No JVD, regular, normal S1+S2 without added sounds or murmurs.  ABDOMEN: ND, soft, non-tender, normal bowel sounds.  EXTREMITIES: Warm, well-perfused, no edema.   NEUROLOGY: GCS 15/15, no focal deficits.  VASC: No carotid bruits. Carotid, radial, brachial, popliteal, dorsalis pedis and posterior tibialis pulses are normal in volumes and symmetric bilaterally.   SKIN: No ecchymoses, no rashes.  PSYCH: Cooperative, pleasant affect.       Investigations:  I personally viewed and interpreted the following investigations:    Labs:    CBC RESULTS:  Lab Results   Component Value Date    WBC 4.3 06/09/2022    WBC 4.1 07/11/2019    RBC 5.26 06/09/2022    RBC 5.10 07/11/2019    HGB 15.2 06/09/2022    HGB 15.4 07/11/2019    HCT 45.4 06/09/2022    HCT 46.4 07/11/2019    MCV 86 06/09/2022    MCV 91 07/11/2019    MCH 28.9 06/09/2022    MCH 30.2 07/11/2019    MCHC 33.5 06/09/2022    MCHC 33.2 07/11/2019    RDW 11.8 06/09/2022    RDW 13.0 07/11/2019     06/09/2022     07/11/2019       CMP RESULTS:  Lab Results   Component Value Date     06/09/2022     07/11/2019    POTASSIUM 4.1 06/09/2022    POTASSIUM 4.9 07/11/2019    CHLORIDE 107 06/09/2022    CHLORIDE 107 07/11/2019    CO2 27 06/09/2022    CO2 28 07/11/2019    ANIONGAP 6 06/09/2022    ANIONGAP 5 07/11/2019     (H) 06/09/2022    GLC 90 07/11/2019    BUN 15 06/09/2022    BUN 17 07/11/2019    CR 0.92 06/09/2022    CR 0.89 07/11/2019    GFRESTIMATED >90 " 06/09/2022    GFRESTIMATED >90 07/11/2019    GFRESTBLACK >90 07/11/2019    ANNELISE 9.2 06/09/2022    ANNELISE 8.9 07/11/2019    BILITOTAL 0.7 06/09/2022    BILITOTAL 0.8 07/11/2019    ALBUMIN 3.9 06/09/2022    ALBUMIN 4.0 07/11/2019    ALKPHOS 96 06/09/2022    ALKPHOS 75 07/11/2019    ALT 34 06/09/2022    ALT 27 07/11/2019    AST 24 06/09/2022    AST 16 07/11/2019          LIPIDS:  Lab Results   Component Value Date    CHOL 231 06/09/2022    CHOL 209 07/11/2019     Lab Results   Component Value Date    HDL 57 06/09/2022    HDL 64 07/11/2019     Lab Results   Component Value Date     06/09/2022     07/11/2019     Lab Results   Component Value Date    TRIG 110 06/09/2022    TRIG 97 07/11/2019       Recent Tests:    Electrocardiogram (07/01/2022):  Normal sinus rhythm, normal axis, normal intervals, no ischemic changes.    Exercise Stress Echocardiogram (07/09/2007):  No ECG evidence of ischemia.   No stress induced regional wall motion abnormalities.   Normal functional capacity.   Normal exercise echo without evidence of infarct or ischemia  Normal   functional capacity.       Stress Findings   No electrocardiographic evidence of ischemia.   Target Heart Rate was achieved.   The patient exhibited a hypertensive response with stress.       Baseline EKG/Symptoms   Nonspecific ST changes/flattening in V2-V3.         Assessment and Plan:   Fito Frye is a 51 year old male with a past medical history of hypertension and a family history of premature coronary disease presenting for evaluation of chest tightness, postural dizziness, and hypertension in July 2022.    I think that Mr. Frye's chief complaint of postural dizziness is likely exacerbated by inadequate fluid intake during the heat wave.  I talked about increasing the volume of fluid with particular attention to avoidance of artificial sweeteners and alcohol in an effort to improve this.    Mr. Frye's symptoms of chest tightness are  consistent with nonanginal chest pain.  However, given his family history of premature coronary disease and his history of hypertension, he is in the low to intermediate risk category.  I talked to him about this and offered him a transthoracic echocardiogram and coronary CTA to further evaluate his chest pain. I discussed the CTA procedure in detail with the patient and specifically outlined the need to lie still and hold one's breath for 15 seconds, as well as the associated risks of contrast exposure, radiation exposure, and the possibility of further investigations based on test results (e.g., invasive coronary angiography).  I advised him that he should pursue these tests in the coming weeks but he wished to wait until he increased his fluid intake to see what the effect of this might be on his chest pain and exertional dyspnea.  He will reach out to me in the next 2 weeks to let me know what the effect of the increase hydration was.      Problems:  1. Postural dizziness, likely related to dehydration  2. Nonanginal chest pain  3. Exertional dyspnea, MRC grade 1  4. Family history of premature coronary disease    Plan:  - Continue hydrochlorothiazide 12.5 mg once daily  - TTE  - Coronary CTA  - Evaluate need for aspirin/statin after coronary CTA      A total of 60 minutes were spent on the day of the visit for chart review, care coordination, face-to-face consultation with the patient, and documentation.       Jose Parisi [facility], [place]    Cardiovascular Division  Pager [phone]    CC  Patient Care Team:  Mona Szymanski MD as PCP - General (Family Practice)  Humphrey Logan MD as Assigned Neuroscience Provider  Mona Szymanski MD as Assigned PCP

## 2022-06-30 NOTE — PROGRESS NOTES
Ongoing SW/CM Assessment/Plan of Care Note     See SW/CM flowsheets for goals and other objective data.    Patient/Family discharge goal (s):             PT Recommendation:     Recommendation for Discharge: PT IL: Patient is appropriate for daily Physical Therapy    OT Recommendation:     Recommendations for Discharge: OT IL: Patient is appropriate for daily Occupational Therapy    SLP Recommendation:       Disposition:  Planned Discharge Destination: Location not determined at this time    Progress note:   Met with patient and spouse to answer questions regarding services available after hospital. Provided with transportation assistance resources. SW notified of patient request for in home assistance.        "Fito is a 51 year old who is being evaluated via a billable video visit.      How would you like to obtain your AVS? MyChart  If the video visit is dropped, the invitation should be resent by: Text to cell phone: [phone]  Will anyone else be joining your video visit? No          Assessment & Plan     Benign essential hypertension  Tolerating hydrochlorothiazide 12.5 mg daily well.  Blood pressure recheck today is improved.  I recommended checking blood pressure at least once daily for next week.  He will bring blood pressure readings to his cardiology visit.  Discussed cardiology may adjust medication at his visit, and I would anticipate further monitoring and adjustment with him following that visit.   - Adult Cardiology Eval  Referral  - Basic metabolic panel  (Ca, Cl, CO2, Creat, Gluc, K, Na, BUN)    Atypical chest pain  Years of episodes of chest tightness, SOB, fatigue, with some exertional component-inconsistent, eval in 2007 with echo stress test normal, more frequent and intense episodes this summer. Brother recently had MI age 54/55. Recommended evaluation with cardiology. He agrees to go to the ER immediately if he has an episode that does not resolve with rest or with severe episode.     - Adult Cardiology Eval  Referral     SOB  eval for cardiac cause as above  Also considering the possibility of asthma given humidity, heat and cold seem to be triggers, often notices symptoms about 10 minutes after exercise. Has hx of allergies as a child, no asthma and no allergy sx as an adult.   Will try albuterol as needed for episodes of SOB. Consider PFTs following cardiology eval.           BMI:   Estimated body mass index is 30.65 kg/m  as calculated from the following:    Height as of 3/3/22: 1.93 m (6' 4\").    Weight as of 3/3/22: 114.2 kg (251 lb 12.8 oz).             No follow-ups on file.    Mona Szymanski MD  [facility]    Berkley Payton is a 51 year old " , presenting for the following health issues:  Follow up bp and chest tightness  Video Visit (Follow up for BP medication, today's /98, patient states he is not feeling great, patient states he is experiencing fatigue -usually when the weather is extremely HOT, chest pressure-comes and goes throughout the day, chest pressure hasn't improved and getting worst, patient states he experiencing about x5 per week, no heart palpitations )    Regarding chest discomfort, he has been having episodes of chest discomfort since about 2007.  The first episode occurred when he is training for marathon.  He was taken by ambulance to the hospital and was told that his heart was working fine.  He has continued to have intermittent episodes.  These episodes feel like chest tightness or squeezing accompanied by some shortness of breath and fatigue.  He has episodes at rest and also episodes following activity.  He reports that the episodes seems delayed after activity, not usually during activity, however he did note that he was feeling short of breath during a long 3 mile walk, stopped to rest, recovered and walked home without incident.  The chest tightness episodes have changed a week and that they have become more pronounced and more frequent.  They continue to be a cluster of tightness with shortness of breath and fatigue.  No history of asthma.  No report of wheezing.  The episodes seem to happen more often and extremes of temperature.  He is a recycling truck.  He denies episodes during the process of pulling carts while on his route, but notices tightness afterward is to rest for a few minutes or more until resolution.  He notes that he has been gradually gaining weight over the past several months.  He denies any sudden weight gain.  He has some sock line swelling in his legs, but more prominent recent changes.  He has had episodes when he feels as a tightness or shortness of breath when he down, which gets better when  he sits up, however he has also had loads where he feels better when he is lying flat, and notices more discomfort when he sits up, so it is inconsistent.     He had an episode of chest tightness last week when he was working in very hot weather and his truck was 115 degrees. He felt lightheaded and ill and thinks he had heat illness. He gradually improved, but notes that heat does seem to exacerbate symptoms.     He reports that he has been seeing Dr. Logan for headaches and used to think that the fatigue was related to the headaches.  He has realized that jaw pain was related to headaches as both the headaches and jaw pain improved with nortriptyline, but the chest sensation and fatigue have not.    Allergies as a kid. Allergies have not bothered him as an adult    Retook his bp after resting and it was 134/88.  Has not been taking release and starting hydrochlorothiazide.             Review of Systems          Objective    Vitals - Patient Reported  Systolic (Patient Reported): (!) 146  Diastolic (Patient Reported): (!) 98  Pulse (Patient Reported): 90      Vitals:  No vitals were obtained today due to virtual visit.    Physical Exam   GENERAL: Healthy, alert and no distress  EYES: Eyes grossly normal to inspection.  No discharge or erythema, or obvious scleral/conjunctival abnormalities.  RESP: No audible wheeze, cough, or visible cyanosis.  No visible retractions or increased work of breathing.    SKIN: Visible skin clear. No significant rash, abnormal pigmentation or lesions.  NEURO: Cranial nerves grossly intact.  Mentation and speech appropriate for age.  PSYCH: Mentation appears normal, affect normal/bright, judgement and insight intact, normal speech and appearance well-groomed.    Lab on 06/09/2022   Component Date Value Ref Range Status     Sodium 06/09/2022 140  133 - 144 mmol/L Final     Potassium 06/09/2022 4.1  3.4 - 5.3 mmol/L Final     Chloride 06/09/2022 107  94 - 109 mmol/L Final     Carbon  Dioxide (CO2) 06/09/2022 27  20 - 32 mmol/L Final     Anion Gap 06/09/2022 6  3 - 14 mmol/L Final     Urea Nitrogen 06/09/2022 15  7 - 30 mg/dL Final     Creatinine 06/09/2022 0.92  0.66 - 1.25 mg/dL Final     Calcium 06/09/2022 9.2  8.5 - 10.1 mg/dL Final     Glucose 06/09/2022 113 (A) 70 - 99 mg/dL Final     Alkaline Phosphatase 06/09/2022 96  40 - 150 U/L Final     AST 06/09/2022 24  0 - 45 U/L Final     ALT 06/09/2022 34  0 - 70 U/L Final     Protein Total 06/09/2022 7.7  6.8 - 8.8 g/dL Final     Albumin 06/09/2022 3.9  3.4 - 5.0 g/dL Final     Bilirubin Total 06/09/2022 0.7  0.2 - 1.3 mg/dL Final     GFR Estimate 06/09/2022 >90  >60 mL/min/1.73m2 Final    Effective December 21, 2021 eGFRcr in adults is calculated using the 2021 CKD-EPI creatinine equation which includes age and gender (Garret et al., NE, DOI: 10.1056/ZTYPzh7292963)     WBC Count 06/09/2022 4.3  4.0 - 11.0 10e3/uL Final     RBC Count 06/09/2022 5.26  4.40 - 5.90 10e6/uL Final     Hemoglobin 06/09/2022 15.2  13.3 - 17.7 g/dL Final     Hematocrit 06/09/2022 45.4  40.0 - 53.0 % Final     MCV 06/09/2022 86  78 - 100 fL Final     MCH 06/09/2022 28.9  26.5 - 33.0 pg Final     MCHC 06/09/2022 33.5  31.5 - 36.5 g/dL Final     RDW 06/09/2022 11.8  10.0 - 15.0 % Final     Platelet Count 06/09/2022 267  150 - 450 10e3/uL Final     Color Urine 06/09/2022 Yellow  Colorless, Straw, Light Yellow, Yellow Final     Appearance Urine 06/09/2022 Clear  Clear Final     Glucose Urine 06/09/2022 Negative  Negative mg/dL Final     Bilirubin Urine 06/09/2022 Negative  Negative Final     Ketones Urine 06/09/2022 Negative  Negative mg/dL Final     Specific Gravity Urine 06/09/2022 1.010  1.003 - 1.035 Final     Blood Urine 06/09/2022 Negative  Negative Final     pH Urine 06/09/2022 6.0  5.0 - 7.0 Final     Protein Albumin Urine 06/09/2022 Negative  Negative mg/dL Final     Urobilinogen Urine 06/09/2022 0.2  0.2, 1.0 E.U./dL Final     Nitrite Urine 06/09/2022 Negative   Negative Final     Leukocyte Esterase Urine 06/09/2022 Negative  Negative Final     Creatinine Urine mg/dL 06/09/2022 279  mg/dL Final     Albumin Urine mg/L 06/09/2022 18  mg/L Final     Albumin Urine mg/g Cr 06/09/2022 6.45  0.00 - 17.00 mg/g Cr Final     TSH 06/09/2022 1.27  0.40 - 4.00 mU/L Final     Cholesterol 06/09/2022 231 (A) <200 mg/dL Final     Triglycerides 06/09/2022 110  <150 mg/dL Final     Direct Measure HDL 06/09/2022 57  >=40 mg/dL Final     LDL Cholesterol Calculated 06/09/2022 152 (A) <=100 mg/dL Final     Non HDL Cholesterol 06/09/2022 174 (A) <130 mg/dL Final     Patient Fasting > 8hrs? 06/09/2022 Yes   Final     Bacteria Urine 06/09/2022 Few (A) None Seen /HPF Final     RBC Urine 06/09/2022 None Seen  0-2 /HPF /HPF Final     WBC Urine 06/09/2022 None Seen  0-5 /HPF /HPF Final     XCELERA       Interpretation Summary   No ECG evidence of ischemia.   No stress induced regional wall motion abnormalities.   Normal functional capacity.   Normal exercise echo without evidence of infarct or ischemia  Normal   functional capacity.       Stress Findings   No electrocardiographic evidence of ischemia.   Target Heart Rate was achieved.   The patient exhibited a hypertensive response with stress.       Baseline EKG/Symptoms   Nonspecific ST changes/flattening in V2-V3.       Left Ventricle   Left ventricular systolic function is normal.   Ejection Fraction = >55%.   The left ventricle is normal in size.   There is normal left ventricular wall thickness.   No regional wall motion abnormalities noted.        Wall Motion Analysis   Stage 1:   No Wall Motion Abnormalities Defined   Stage 2:   No Wall Motion Abnormalities Defined       Aortic Valve   No significant aortic valvular disease noted on routine screening color flow   Doppler and pulsed Doppler examination.       Mitral Valve   There is trace mitral regurgitation.       Atria   The left atrial size is normal.       Tricuspid Valve   The tricuspid  valve is normal in structure and function.       Right Ventricle   The right ventricular systolic function is normal.       Pericardium   There is no pericardial effusion.       Procedure:   Stress Echo Bike Adult.   Contrast Definity 2ML.       Doppler Measurements & Calculations   TR Max P mmHg       Stress Results   Protocol:  Bike Stress Echo    Target HR: 155 bpm            Maximum Predicted HR: 182 bpm       Stage        Duration(mm:ss)  HR(bpm)   BP        DOSE  Comment          Stress Duration: 16:15 mm:ss  Recovery Time: 00:00 mm:ss    Maximum Stress HR: 169 bpm    METS: 225       Interpreting Physician:  Jai Zuñiga MD electronically signed on   2007 15:46:52                Video-Visit Details    Video Start Time: 12:40    Type of service:  Video Visit    Video End Time:12:56 PM    Originating Location (pt. Location): Home    Distant Location (provider location):  [facility]     Platform used for Video Visit: [employer]    Soco Wan.

## 2022-07-01 ENCOUNTER — OFFICE VISIT (OUTPATIENT)
Dept: CARDIOLOGY | Facility: CLINIC | Age: 52
End: 2022-07-01
Attending: STUDENT IN AN ORGANIZED HEALTH CARE EDUCATION/TRAINING PROGRAM
Payer: COMMERCIAL

## 2022-07-01 VITALS
DIASTOLIC BLOOD PRESSURE: 86 MMHG | WEIGHT: 239.1 LBS | OXYGEN SATURATION: 98 % | HEIGHT: 76 IN | HEART RATE: 78 BPM | BODY MASS INDEX: 29.11 KG/M2 | SYSTOLIC BLOOD PRESSURE: 127 MMHG

## 2022-07-01 DIAGNOSIS — R07.89 ATYPICAL CHEST PAIN: Primary | ICD-10-CM

## 2022-07-01 PROCEDURE — 99205 OFFICE O/P NEW HI 60 MIN: CPT | Performed by: STUDENT IN AN ORGANIZED HEALTH CARE EDUCATION/TRAINING PROGRAM

## 2022-07-01 PROCEDURE — 93005 ELECTROCARDIOGRAM TRACING: CPT

## 2022-07-01 PROCEDURE — G0463 HOSPITAL OUTPT CLINIC VISIT: HCPCS | Mod: 25

## 2022-07-01 ASSESSMENT — PAIN SCALES - GENERAL: PAINLEVEL: MODERATE PAIN (4)

## 2022-07-01 NOTE — LETTER
7/1/2022      RE: Fito Frye  [address]  [place] [address]       Dear Colleague,    Thank you for the opportunity to participate in the care of your patient, Fito Frye, at the [facility] [place] at [facility]. Please see a copy of my visit note below.            Chief Complaint: Establish general cardiovascular care    HPI: Fito Frye is a 51 year old male with a past medical history of hypertension who was referred to me for evaluation of hypertension and atypical chest pain by Dr. Mona Szymanski.     Briefly, Mr. Frye follow-up with Dr. Szymanski, his primary care clinician for evaluation of exertional chest pain.  He notes that the first episode of this was in 2007, when he was 18 primary fine.  At the time, he was having chest tightness or squeezing and underwent a stress echocardiogram which was unremarkable.  Since then, he has had intermittent episodes both at rest and with activity. Mr. Frye is able to walk over a mile without symptoms. Mr. Frye also notes occasional peripheral edema and weight gain the last few months.    Mr. Frye visited Dr. Szymanski yesterday to follow-up his blood pressure.  Although his blood pressure exhibited better control, he  mentioned that he was having increasingly frequent chest tightness.  Today, he notes that this has been occurring daily for at least a year.  Prior to that, it was occurring less frequently but never really stopped since his after mentioned stress test.  The chest tightness has a central thoracic location with radiation to his arms.  However, there was no exertional component to the symptoms.  This has also been accompanied by exertional dyspnea and postural dizziness.    At the time of the consultation, he notes an absence of chest pain at rest or dyspnea at rest.  Mr. Frye said he drinks a total of 1 to 2 L of fluid daily with 1-2 beers.   He is not take any energy drinks/supplements.  He feels that his chief complaint is the postural dizziness that has been evident for the last few weeks during the heat wave.  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    A comprehensive ROS was done and the details are included above in the HPI.    Past Medical History:  - Hypertension  - No prior history of  T2DM, dyslipidemia, CAD, TIA/stroke, vascular aneurysms, PAD  Past Medical History:   Diagnosis Date     NO ACTIVE PROBLEMS        Past Surgical History:    Past Surgical History:   Procedure Laterality Date     [facility] ECHO HEART XTHORACIC, STRESS/REST  2007    Negative       Drug History:  Home cardiac meds: Hydrochlorothiazide 12.5 mg once daily  Current Outpatient Medications   Medication Sig Dispense Refill     Acetaminophen (TYLENOL PO)        hydrochlorothiazide (HYDRODIURIL) 12.5 MG tablet Take 1 tablet (12.5 mg) by mouth daily 30 tablet 1     nortriptyline (PAMELOR) 10 MG capsule Take 1 capsule (10 mg) by mouth At Bedtime 91 capsule 1     VITAMIN D OR 1 tablet daily       albuterol (PROAIR HFA/PROVENTIL HFA/VENTOLIN HFA) 108 (90 Base) MCG/ACT inhaler Inhale 1-2 puffs into the lungs every 6 hours as needed for shortness of breath / dyspnea or wheezing (Patient not taking: Reported on 2022) 18 g 1         Family History:  - Brother with MI at age 55   - Mother  from MI ate ge 70   - Father with MI in 60s and PCI   - No family history of valvular disorders  Family History   Problem Relation Age of Onset     Myocardial Infarction Mother 70         of MI     Breast Cancer Mother 50     Coronary Artery Disease Father 65        in his 60s     Diabetes Type 2  Father      Sleep Apnea Father         diagnosed in mid 70's     Multiple myeloma Father 78     No Known Problems Sister      Myocardial Infarction Brother 55       Social History:  - 15 drinks/week   Social  "History     Tobacco Use     Smoking status: Never Smoker     Smokeless tobacco: Never Used   Substance Use Topics     Alcohol use: Yes     Comment: usually a couple beers after work daily       Allergies   Allergen Reactions     No Known Drug Allergies      Seasonal Allergies          Physical Examination:  Vitals: /86 (BP Location: Right arm, Patient Position: Chair, Cuff Size: Adult Regular)   Pulse 78   Ht 1.935 m (6' 4.2\")   Wt 108.5 kg (239 lb 1.6 oz)   SpO2 98%   BMI 28.95 kg/m    BMI= Body mass index is 28.95 kg/m .    GENERAL: Healthy, alert and no distress  Eyes: No xanthelasmas.  NECK: No palpable neck masses/goitre and no evidence of retrosternal goitre.   ENT: Moist mucosal membranes.  RESPIRATORY: No signs of resp distress. Lungs CTAB.  Chest pain is not reproducible upon palpation of chest wall.  CARDIOVASCULAR:  No JVD, regular, normal S1+S2 without added sounds or murmurs.  ABDOMEN: ND, soft, non-tender, normal bowel sounds.  EXTREMITIES: Warm, well-perfused, no edema.   NEUROLOGY: GCS 15/15, no focal deficits.  VASC: No carotid bruits. Carotid, radial, brachial, popliteal, dorsalis pedis and posterior tibialis pulses are normal in volumes and symmetric bilaterally.   SKIN: No ecchymoses, no rashes.  PSYCH: Cooperative, pleasant affect.       Investigations:  I personally viewed and interpreted the following investigations:    Labs:    CBC RESULTS:  Lab Results   Component Value Date    WBC 4.3 06/09/2022    WBC 4.1 07/11/2019    RBC 5.26 06/09/2022    RBC 5.10 07/11/2019    HGB 15.2 06/09/2022    HGB 15.4 07/11/2019    HCT 45.4 06/09/2022    HCT 46.4 07/11/2019    MCV 86 06/09/2022    MCV 91 07/11/2019    MCH 28.9 06/09/2022    MCH 30.2 07/11/2019    MCHC 33.5 06/09/2022    MCHC 33.2 07/11/2019    RDW 11.8 06/09/2022    RDW 13.0 07/11/2019     06/09/2022     07/11/2019       CMP RESULTS:  Lab Results   Component Value Date     06/09/2022     07/11/2019    " POTASSIUM 4.1 06/09/2022    POTASSIUM 4.9 07/11/2019    CHLORIDE 107 06/09/2022    CHLORIDE 107 07/11/2019    CO2 27 06/09/2022    CO2 28 07/11/2019    ANIONGAP 6 06/09/2022    ANIONGAP 5 07/11/2019     (H) 06/09/2022    GLC 90 07/11/2019    BUN 15 06/09/2022    BUN 17 07/11/2019    CR 0.92 06/09/2022    CR 0.89 07/11/2019    GFRESTIMATED >90 06/09/2022    GFRESTIMATED >90 07/11/2019    GFRESTBLACK >90 07/11/2019    ANNELISE 9.2 06/09/2022    ANNELISE 8.9 07/11/2019    BILITOTAL 0.7 06/09/2022    BILITOTAL 0.8 07/11/2019    ALBUMIN 3.9 06/09/2022    ALBUMIN 4.0 07/11/2019    ALKPHOS 96 06/09/2022    ALKPHOS 75 07/11/2019    ALT 34 06/09/2022    ALT 27 07/11/2019    AST 24 06/09/2022    AST 16 07/11/2019          LIPIDS:  Lab Results   Component Value Date    CHOL 231 06/09/2022    CHOL 209 07/11/2019     Lab Results   Component Value Date    HDL 57 06/09/2022    HDL 64 07/11/2019     Lab Results   Component Value Date     06/09/2022     07/11/2019     Lab Results   Component Value Date    TRIG 110 06/09/2022    TRIG 97 07/11/2019       Recent Tests:    Electrocardiogram (07/01/2022):  Normal sinus rhythm, normal axis, normal intervals, no ischemic changes.    Exercise Stress Echocardiogram (07/09/2007):  No ECG evidence of ischemia.   No stress induced regional wall motion abnormalities.   Normal functional capacity.   Normal exercise echo without evidence of infarct or ischemia  Normal   functional capacity.       Stress Findings   No electrocardiographic evidence of ischemia.   Target Heart Rate was achieved.   The patient exhibited a hypertensive response with stress.       Baseline EKG/Symptoms   Nonspecific ST changes/flattening in V2-V3.         Assessment and Plan:   Fito Frye is a 51 year old male with a past medical history of hypertension and a family history of premature coronary disease presenting for evaluation of chest tightness, postural dizziness, and hypertension in July  2022.    I think that Mr. Frye's chief complaint of postural dizziness is likely exacerbated by inadequate fluid intake during the heat wave.  I talked about increasing the volume of fluid with particular attention to avoidance of artificial sweeteners and alcohol in an effort to improve this.    Mr. Frye's symptoms of chest tightness are consistent with nonanginal chest pain.  However, given his family history of premature coronary disease and his history of hypertension, he is in the low to intermediate risk category.  I talked to him about this and offered him a transthoracic echocardiogram and coronary CTA to further evaluate his chest pain. I discussed the CTA procedure in detail with the patient and specifically outlined the need to lie still and hold one's breath for 15 seconds, as well as the associated risks of contrast exposure, radiation exposure, and the possibility of further investigations based on test results (e.g., invasive coronary angiography).  I advised him that he should pursue these tests in the coming weeks but he wished to wait until he increased his fluid intake to see what the effect of this might be on his chest pain and exertional dyspnea.  He will reach out to me in the next 2 weeks to let me know what the effect of the increase hydration was.      Problems:  1. Postural dizziness, likely related to dehydration  2. Nonanginal chest pain  3. Exertional dyspnea, MRC grade 1  4. Family history of premature coronary disease    Plan:  - Continue hydrochlorothiazide 12.5 mg once daily  - TTE  - Coronary CTA  - Evaluate need for aspirin/statin after coronary CTA      A total of 60 minutes were spent on the day of the visit for chart review, care coordination, face-to-face consultation with the patient, and documentation.       Jose Parisi [facility], [place]    Cardiovascular Division  Pager [phone]    CC  Patient Care Team:  Mona Szymanski MD as PCP - General (Family  Practice)  Humphrey Logan MD as Assigned Neuroscience Provider

## 2022-07-01 NOTE — PATIENT INSTRUCTIONS
You were seen at the [facility] clinic today.   You saw Dr. Jose Parisi, [facility], [place].    The following is a summary of your office visit today:    Ultrasound of your heart to look at your heart valves/muscle  CT of your heart to look at your heart's arteries  Please try and pursue moderate-intensity exercise for 30 minutes at least five times weekly.  Please try and maintain a diet rich in fruit and vegetables and low in saturated fats and sugars.   Follow up with me in two months      If you have questions after this visit:   Send a [employer] message or contact the Cardiology Nurse Line  Office:  [phone] option #1, then #4 and ask for a Cardiology Nurse  Fax:  [phone]   Appointments:  [phone] option #1, then option #1     HOW TO CHECK YOUR BLOOD PRESSURE AT HOME:    Avoid eating, smoking, and exercising for at least 30 minutes before taking a reading.    Be sure you have taken your BP medication at least 2-3 hours before you check it.     Sit quietly for 10 minutes before a reading.     Sit in a chair with your feet flat on the floor. Rest your  arm on a table so that the arm cuff is at the same level as your heart.    Remain still during the reading.    Record your blood pressure and pulse in a log and bring to your next appointment.     If you have had any blood work, imaging or other testing completed we will be in touch within 1-2 weeks regarding the results. If you have any questions, concerns or need to schedule a follow up, please contact us at the numbers above. If you are needing refills please contact your pharmacy. For urgent after-hours care please call the the [facility] at [phone] (option #4) and ask to speak to the on-call Cardiologist.    It was a pleasure meeting with you today. Please let us know if there is anything else we can do for you so that we can be sure you are leaving completely satisfied with your care  experience.     Your Cardiology Team at the [facility]

## 2022-07-01 NOTE — NURSING NOTE
Chief Complaint   Patient presents with     Follow Up     NEW referral from PCP for increasing episodes of chest tightness and SOB.      Vitals were taken and medications were reconciled. EKG was performed   SERA Oscar  12:39 PM

## 2022-07-04 LAB
ATRIAL RATE - MUSE: 72 BPM
DIASTOLIC BLOOD PRESSURE - MUSE: NORMAL MMHG
INTERPRETATION ECG - MUSE: NORMAL
P AXIS - MUSE: 70 DEGREES
PR INTERVAL - MUSE: 158 MS
QRS DURATION - MUSE: 88 MS
QT - MUSE: 390 MS
QTC - MUSE: 427 MS
R AXIS - MUSE: 44 DEGREES
SYSTOLIC BLOOD PRESSURE - MUSE: NORMAL MMHG
T AXIS - MUSE: 25 DEGREES
VENTRICULAR RATE- MUSE: 72 BPM

## 2022-07-28 ENCOUNTER — VIRTUAL VISIT (OUTPATIENT)
Dept: FAMILY MEDICINE | Facility: CLINIC | Age: 52
End: 2022-07-28
Payer: COMMERCIAL

## 2022-07-28 DIAGNOSIS — I10 BENIGN ESSENTIAL HYPERTENSION: Primary | ICD-10-CM

## 2022-07-28 DIAGNOSIS — R06.02 SOB (SHORTNESS OF BREATH): ICD-10-CM

## 2022-07-28 DIAGNOSIS — R07.89 ATYPICAL CHEST PAIN: ICD-10-CM

## 2022-07-28 DIAGNOSIS — R06.2 WHEEZING: ICD-10-CM

## 2022-07-28 DIAGNOSIS — G44.209 TENSION HEADACHE: ICD-10-CM

## 2022-07-28 PROBLEM — Z11.4 SCREENING FOR HIV (HUMAN IMMUNODEFICIENCY VIRUS): Status: ACTIVE | Noted: 2022-07-28

## 2022-07-28 PROCEDURE — 99214 OFFICE O/P EST MOD 30 MIN: CPT | Mod: TEL | Performed by: FAMILY MEDICINE

## 2022-07-28 RX ORDER — HYDROCHLOROTHIAZIDE 12.5 MG/1
12.5 TABLET ORAL DAILY
Qty: 90 TABLET | Refills: 1 | Status: SHIPPED | OUTPATIENT
Start: 2022-07-28 | End: 2023-03-23

## 2022-07-28 RX ORDER — NORTRIPTYLINE HCL 10 MG
10 CAPSULE ORAL AT BEDTIME
Qty: 91 CAPSULE | Refills: 3 | Status: SHIPPED | OUTPATIENT
Start: 2022-07-28 | End: 2023-05-30

## 2022-07-28 NOTE — PROGRESS NOTES
"Fito is a 51 year old who is being evaluated via a billable video visit.      How would you like to obtain your AVS? MyChart  If the video visit is dropped, the invitation should be resent by: Text to cell phone: [phone]  Will anyone else be joining your video visit? No          Assessment & Plan     Benign essential hypertension   tolerating hydrochlorothiazide, no changes today. Follow up in clinic at time of preventive visit. Passed DOT exam  - hydrochlorothiazide (HYDRODIURIL) 12.5 MG tablet  Dispense: 90 tablet; Refill: 1    Atypical chest pain  Wheezing  SOB  Symptoms have been improving after taking albuterol. Continue albuterol prn and schedule with pulmonology for further eval.   Recently evaluated by cardiology Dr. Parisi. No evidence of ischemia on stress echo.    - Adult Pulmonary Medicine Referral    Per Dr. Parisi's note:   \"Assessment and Plan:   Fito Frye is a 51 year old male with a past medical history of hypertension and a family history of premature coronary disease presenting for evaluation of chest tightness, postural dizziness, and hypertension in July 2022.     I think that Mr. Frye's chief complaint of postural dizziness is likely exacerbated by inadequate fluid intake during the heat wave.  I talked about increasing the volume of fluid with particular attention to avoidance of artificial sweeteners and alcohol in an effort to improve this.     Mr. Frye's symptoms of chest tightness are consistent with nonanginal chest pain.  However, given his family history of premature coronary disease and his history of hypertension, he is in the low to intermediate risk category.  I talked to him about this and offered him a transthoracic echocardiogram and coronary CTA to further evaluate his chest pain. I discussed the CTA procedure in detail with the patient and specifically outlined the need to lie still and hold one's breath for 15 seconds, as well as the associated risks of " "contrast exposure, radiation exposure, and the possibility of further investigations based on test results (e.g., invasive coronary angiography).  I advised him that he should pursue these tests in the coming weeks but he wished to wait until he increased his fluid intake to see what the effect of this might be on his chest pain and exertional dyspnea.  He will reach out to me in the next 2 weeks to let me know what the effect of the increase hydration was.        Problems:  1. Postural dizziness, likely related to dehydration  2. Nonanginal chest pain  3. Exertional dyspnea, MRC grade 1  4. Family history of premature coronary disease     Plan:  - Continue hydrochlorothiazide 12.5 mg once daily  - TTE  - Coronary CTA  - Evaluate need for aspirin/statin after coronary CTA   \"       Tension headache  Nortriptyline has been working well. He requests refil today.   - nortriptyline (PAMELOR) 10 MG capsule  Dispense: 91 capsule; Refill: 3          BMI:   Estimated body mass index is 28.95 kg/m  as calculated from the following:    Height as of 7/1/22: 1.935 m (6' 4.2\").    Weight as of 7/1/22: 108.5 kg (239 lb 1.6 oz).           Return in about 3 months (around 10/28/2022) for Routine preventive.    Mona Szymanski MD  [facility]    Berkley Payton is a 51 year old, presenting for the following health issues:  Hypertension      HPI     Feeling better. Using the inhaler has helped. In hot weather and when feeling short of breath. Has used it every other day. Seems weather related. Went back to his wife's moms place in [facility] and felt fine the whole time. It was also cooler.     One other thing happened. When he was at work, he got stung by a wasp again and had a reaction. Initially not big deal. Starts to swell, just keeps swelling and swelling. Whole hand swelled up and it went up his arm. Next day more swollen. He had a day of workman's comp and took benadryl. Same thing has happened a " few times. He went to an acute care clinic.     Home bp every couple of days or so. 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 Mostly his bp has been in the 130s systolic. 80s to low 90s.     Review of Systems          Objective          Vitals:  No vitals were obtained today due to virtual visit.    Physical Exam   GENERAL: Healthy, alert and no distress                     Video-Visit Details    Video Start Time: 12:20    Type of service:  Video Visit - changed to telephone visit. Duration 16 minutes.               .  ..

## 2022-08-01 ENCOUNTER — TELEPHONE (OUTPATIENT)
Dept: CARDIOLOGY | Facility: CLINIC | Age: 52
End: 2022-08-01

## 2022-08-15 DIAGNOSIS — R06.2 WHEEZING: ICD-10-CM

## 2022-08-15 DIAGNOSIS — R07.9 CHEST PAIN: ICD-10-CM

## 2022-08-15 DIAGNOSIS — R06.02 SHORTNESS OF BREATH: Primary | ICD-10-CM

## 2022-08-15 NOTE — TELEPHONE ENCOUNTER
Action 8/15/22 sv   Action Taken Message sent to [facility] to place PFT and CXR order       RECORDS RECEIVED FROM: internal    DATE RECEIVED: 10.12.22    NOTES STATUS DETAILS   OFFICE NOTE from referring provider luh Szymanski, Mona HEMPHILL MD   OFFICE NOTE from other specialist In process     DISCHARGE SUMMARY from hospital In process     DISCHARGE REPORT from the ER In process     MEDICATION LIST internal     IMAGING  (NEED IMAGES AND REPORTS)     CT SCAN In process     CHEST XRAY (CXR) In process     TESTS     PULMONARY FUNCTION TESTING (PFT) internal  Scheduled 10.12.22

## 2022-08-16 ENCOUNTER — TELEPHONE (OUTPATIENT)
Dept: PULMONOLOGY | Facility: CLINIC | Age: 52
End: 2022-08-16

## 2022-08-17 ENCOUNTER — OFFICE VISIT (OUTPATIENT)
Dept: URGENT CARE | Facility: URGENT CARE | Age: 52
End: 2022-08-17
Payer: COMMERCIAL

## 2022-08-17 VITALS
OXYGEN SATURATION: 97 % | BODY MASS INDEX: 29.22 KG/M2 | HEART RATE: 72 BPM | RESPIRATION RATE: 16 BRPM | HEIGHT: 76 IN | TEMPERATURE: 97.2 F | SYSTOLIC BLOOD PRESSURE: 127 MMHG | WEIGHT: 240 LBS | DIASTOLIC BLOOD PRESSURE: 88 MMHG
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
## 2022-08-17 PROCEDURE — 99213 OFFICE O/P EST LOW 20 MIN: CPT | Performed by: NURSE PRACTITIONER

## 2022-08-17 NOTE — PROGRESS NOTES
"Chief Complaint   Patient presents with     Urgent Care     Insect Bites     Tick bit x1 week Rt foot.      SUBJECTIVE:  Fito Frye is a 51 year old male who presents to the clinic today with a bug bite to his right foot a week ago. He [place] so did not exactly see what bug it was, has a photo of it welted, 2cm of flesh toned pink edema. Now just a firm red cm of induration. It is itchy. No pale or red rings, headache. Arthralgias, flulike illness.    Past Medical History:   Diagnosis Date     NO ACTIVE PROBLEMS      albuterol (PROAIR HFA/PROVENTIL HFA/VENTOLIN HFA) 108 (90 Base) MCG/ACT inhaler, Inhale 1-2 puffs into the lungs every 6 hours as needed for shortness of breath / dyspnea or wheezing  nortriptyline (PAMELOR) 10 MG capsule, Take 1 capsule (10 mg) by mouth At Bedtime  Acetaminophen (TYLENOL PO),   hydrochlorothiazide (HYDRODIURIL) 12.5 MG tablet, Take 1 tablet (12.5 mg) by mouth daily  VITAMIN D OR, 1 tablet daily    No current facility-administered medications on file prior to visit.    Social History     Tobacco Use     Smoking status: Never Smoker     Smokeless tobacco: Never Used   Substance Use Topics     Alcohol use: Yes     Comment: usually a couple beers after work daily     Allergies   Allergen Reactions     No Known Drug Allergies      Seasonal Allergies      Review of Systems   All systems negative except for those listed above in hpi.    EXAM:   /88   Pulse 72   Temp 97.2  F (36.2  C) (Temporal)   Resp 16   Ht 1.93 m (6' 4\")   Wt 108.9 kg (240 lb)   SpO2 97%   BMI 29.21 kg/m      Physical Exam  Vitals reviewed.   Constitutional:       Appearance: Normal appearance.   HENT:      Head: Normocephalic and atraumatic.      Nose: Nose normal.      Mouth/Throat:      Mouth: Mucous membranes are moist.      Pharynx: Oropharynx is clear.   Eyes:      Extraocular Movements: Extraocular movements intact.      Conjunctiva/sclera: Conjunctivae normal.      Pupils: Pupils " are equal, round, and reactive to light.   Cardiovascular:      Rate and Rhythm: Normal rate.   Pulmonary:      Effort: Pulmonary effort is normal.   Musculoskeletal:         General: No tenderness. Normal range of motion.      Cervical back: Normal range of motion and neck supple.   Skin:     General: Skin is warm and dry.      Findings: Erythema and lesion present.   Neurological:      General: No focal deficit present.      Mental Status: He is alert and oriented to person, place, and time.   Psychiatric:         Mood and Affect: Mood normal.         Behavior: Behavior normal.       ASSESSMENT:    ICD-10-CM    1. Bug bite, initial encounter  W57.XXXA      Bug bite without evidence of erythema migrans  Over the counter 1% hydrocortisone cream as needed 2x a day as needed  Zyrtec, allegra or claratin once a day for at least 3 days - EVENING or BEDTIME- generics available  Benadryl  Epsom salt warm soaks  Cool compresses/ice packs  Large local reactions to insect bites typically consist of an itchy or even painful area of redness, warmth, swelling and/or induration that ranges from a few cm to more than 10 cm in diameter. Large local reactions develop within hours of the bite, progress over 8 to 12 hours or more, and resolve within 3 to 10 days.  Watch for any high fevers, open areas of skin, pus/discharge presence. If these occur, be seen for evaluation in urgent care or emergency room.  Please follow up with primary care provider if not improving, worsening or new symptoms or for any adverse reactions to medications.    A tick must be attached for at least 24-48 hours to transfer pathogens and cause an infection. Infection is very unlikely in the first 48-72 hours.  Infection is more likely if the tick is engorged or has been attached for over 72 hours.  Prophylactic antibiotics are recommended only if:  The tick was attached for 36 hours or more AND less than 72 hours has passed since the tick was  "removed  Treatment with a single dose of Doxycycline, 200mg (4mg/kg in children 8 years of age or older) by mouth with food.  -Do not give to children under 8 or pregnant or lactating women  If there is a contraindication to doxycycline, an alternate antibiotic is not recommended.  Guidelines state, \"If sections of the mouthparts of the tick remain in the skin, they should be left alone as they will normally be expelled spontaneously.\"  Ticks should be removed with tweezers or protected fingers pulling straight out gently and firmly using steady pressure.   Apply ice packs, may take benadryl as needed itch/irritation.  Ibuprofen for discomfort.   Watch for signs of secondary infection- redness, swelling, pain, colored discharge or fever.  Advised to watch for erythema migrans rash (circular red ringed rash), fever, chills, sweats, body aches, stiff neck, headache, joint pain/ discomfort/ sweling or other flu/illness symptoms and be seen by primary care provider at that time.   Erythema migrans rash appears several days after tick bite and is separate from a local reaction to a tick bite.  Please follow up with primary care provider if not improving, worsening or new symptoms.  It will take 6-8 weeks before antibodies are detected with the lyme screen titer.    PLAN:  There are no Patient Instructions on file for this visit.  Follow up with primary care provider with any problems, questions or concerns or if symptoms worsen or fail to improve. Patient agreed to plan and verbalized understanding.    Cassidy Dumont, [facility]  "

## 2022-09-15 ENCOUNTER — HOSPITAL ENCOUNTER (OUTPATIENT)
Dept: CT IMAGING | Facility: CLINIC | Age: 52
Discharge: HOME | End: 2022-09-15
Attending: STUDENT IN AN ORGANIZED HEALTH CARE EDUCATION/TRAINING PROGRAM
Payer: COMMERCIAL

## 2022-09-15 ENCOUNTER — HOSPITAL ENCOUNTER (OUTPATIENT)
Dept: CARDIOLOGY | Facility: CLINIC | Age: 52
Discharge: HOME | End: 2022-09-15
Attending: STUDENT IN AN ORGANIZED HEALTH CARE EDUCATION/TRAINING PROGRAM
Payer: COMMERCIAL

## 2022-09-15 VITALS — OXYGEN SATURATION: 97 % | DIASTOLIC BLOOD PRESSURE: 71 MMHG | HEART RATE: 53 BPM | SYSTOLIC BLOOD PRESSURE: 124 MMHG

## 2022-09-15 DIAGNOSIS — R07.89 ATYPICAL CHEST PAIN: ICD-10-CM

## 2022-09-15 LAB — LVEF ECHO: NORMAL

## 2022-09-15 PROCEDURE — 999N000208 ECHOCARDIOGRAM COMPLETE

## 2022-09-15 PROCEDURE — 250N000013 HC RX MED GY IP 250 OP 250 PS 637: Performed by: INTERNAL MEDICINE

## 2022-09-15 PROCEDURE — 258N000003 HC RX IP 258 OP 636: Performed by: INTERNAL MEDICINE

## 2022-09-15 PROCEDURE — 75574 CT ANGIO HRT W/3D IMAGE: CPT

## 2022-09-15 PROCEDURE — 255N000002 HC RX 255 OP 636: Performed by: STUDENT IN AN ORGANIZED HEALTH CARE EDUCATION/TRAINING PROGRAM

## 2022-09-15 PROCEDURE — 250N000011 HC RX IP 250 OP 636: Performed by: STUDENT IN AN ORGANIZED HEALTH CARE EDUCATION/TRAINING PROGRAM

## 2022-09-15 PROCEDURE — 93306 TTE W/DOPPLER COMPLETE: CPT | Mod: 26 | Performed by: STUDENT IN AN ORGANIZED HEALTH CARE EDUCATION/TRAINING PROGRAM

## 2022-09-15 PROCEDURE — 75574 CT ANGIO HRT W/3D IMAGE: CPT | Mod: 26 | Performed by: INTERNAL MEDICINE

## 2022-09-15 RX ORDER — ACYCLOVIR 200 MG/1
0-1 CAPSULE ORAL
Status: DISCONTINUED | OUTPATIENT
Start: 2022-09-15 | End: 2022-09-16 | Stop reason: HOSPADM

## 2022-09-15 RX ORDER — NITROGLYCERIN 0.4 MG/1
.4-.8 TABLET SUBLINGUAL
Status: DISCONTINUED | OUTPATIENT
Start: 2022-09-15 | End: 2022-09-16 | Stop reason: HOSPADM

## 2022-09-15 RX ORDER — IOPAMIDOL 755 MG/ML
120 INJECTION, SOLUTION INTRAVASCULAR ONCE
Status: COMPLETED | OUTPATIENT
Start: 2022-09-15 | End: 2022-09-15

## 2022-09-15 RX ORDER — DIPHENHYDRAMINE HCL 25 MG
25 CAPSULE ORAL
Status: DISCONTINUED | OUTPATIENT
Start: 2022-09-15 | End: 2022-09-16 | Stop reason: HOSPADM

## 2022-09-15 RX ORDER — DIPHENHYDRAMINE HYDROCHLORIDE 50 MG/ML
25-50 INJECTION INTRAMUSCULAR; INTRAVENOUS
Status: DISCONTINUED | OUTPATIENT
Start: 2022-09-15 | End: 2022-09-16 | Stop reason: HOSPADM

## 2022-09-15 RX ORDER — IVABRADINE 5 MG/1
5-15 TABLET, FILM COATED ORAL
Status: COMPLETED | OUTPATIENT
Start: 2022-09-15 | End: 2022-09-15

## 2022-09-15 RX ORDER — METOPROLOL TARTRATE 25 MG/1
25-100 TABLET, FILM COATED ORAL
Status: COMPLETED | OUTPATIENT
Start: 2022-09-15 | End: 2022-09-15

## 2022-09-15 RX ORDER — METHYLPREDNISOLONE SODIUM SUCCINATE 125 MG/2ML
125 INJECTION, POWDER, LYOPHILIZED, FOR SOLUTION INTRAMUSCULAR; INTRAVENOUS
Status: DISCONTINUED | OUTPATIENT
Start: 2022-09-15 | End: 2022-09-16 | Stop reason: HOSPADM

## 2022-09-15 RX ORDER — METOPROLOL TARTRATE 1 MG/ML
5-15 INJECTION, SOLUTION INTRAVENOUS
Status: DISCONTINUED | OUTPATIENT
Start: 2022-09-15 | End: 2022-09-16 | Stop reason: HOSPADM

## 2022-09-15 RX ORDER — ONDANSETRON 2 MG/ML
4 INJECTION INTRAMUSCULAR; INTRAVENOUS
Status: DISCONTINUED | OUTPATIENT
Start: 2022-09-15 | End: 2022-09-16 | Stop reason: HOSPADM

## 2022-09-15 RX ADMIN — HUMAN ALBUMIN MICROSPHERES AND PERFLUTREN 5 ML: 10; .22 INJECTION, SOLUTION INTRAVENOUS at 11:04

## 2022-09-15 RX ADMIN — IVABRADINE 10 MG: 5 TABLET, FILM COATED ORAL at 11:38

## 2022-09-15 RX ADMIN — IOPAMIDOL 120 ML: 755 INJECTION, SOLUTION INTRAVENOUS at 12:35

## 2022-09-15 RX ADMIN — SODIUM CHLORIDE, PRESERVATIVE FREE 100 ML: 5 INJECTION INTRAVENOUS at 12:36

## 2022-09-15 RX ADMIN — METOPROLOL TARTRATE 50 MG: 50 TABLET, FILM COATED ORAL at 11:38

## 2022-09-15 RX ADMIN — NITROGLYCERIN 0.8 MG: 0.4 TABLET SUBLINGUAL at 12:39

## 2022-09-15 NOTE — PROGRESS NOTES
Pt arrived for Coronary CT angiogram. Test, meds and side effects reviewed with pt.  Resting HR  69 bpm. Given 50 mg PO Metoprolol + 10 mg PO Ivabradine per verbal order. Administered 0.8 mg SL nitro on CTA table per order. CTA completed.  Patient tolerated procedure well and denies symptoms of allergic reaction.  Post monitoring completed and VSS.  D/C instructions reviewed with pt whom verbalized understanding of need to increase PO fluids today. D/C to gold waiting room accompanied by staff.    Christiana Connelly RN

## 2022-09-15 NOTE — PROGRESS NOTES
Chief Complaint: Follow-up coronary CTA     HPI: Fito Frye is a 51 year old male with a CAD risk factor profile: +HTN, + HLD, -DM, -smoker, + Family hx of premature CAD.     Mr. Frye was referred to Dr. Parisi July 2022 for evaluation of atypical chest pain. He described central chest tightness with radiation to bilateral arms, pain was non exertional. He also reported exertional shortness of breath and postural dizziness. He underwent coronary CTA September 2022 demonstrating mild non obstructive CAD with total Agatston score of 83, primarily located in the LAD. ECHO also performed demonstrating normal biventricular systolic function, normal diastolic function, no valvular disease.    Mr. Frye reports feeling fairly well. He continues to experience occasional fatigue and chest tightness, though seems to have improved with cooler weather. He is now using an inhaler for possible asthma and his breathing has also improved. He has pulmonary function tests scheduled for next month. He continues to experience occasional dizziness, mainly when he is feeling short of breath. Otherwise no palpitations, syncope, orthopnea, PND, leg swelling. Checks BP periodically, generally in 130s/80s.    Past Medical History:  - Hypertension  - No prior history of  T2DM, dyslipidemia, CAD, TIA/stroke, vascular aneurysms, PAD    Past Surgical History:  Past Surgical History:   Procedure Laterality Date     [facility] ECHO HEART XTHORACIC, STRESS/REST  7/2007    Negative       Drug History:  Home cardiac meds: Hydrochlorothiazide 12.5 mg once daily  Current Outpatient Medications   Medication Sig Dispense Refill     Acetaminophen (TYLENOL PO)        albuterol (PROAIR HFA/PROVENTIL HFA/VENTOLIN HFA) 108 (90 Base) MCG/ACT inhaler Inhale 1-2 puffs into the lungs every 6 hours as needed for shortness of breath / dyspnea or wheezing 18 g 1     hydrochlorothiazide (HYDRODIURIL) 12.5 MG tablet Take 1 tablet (12.5 mg) by mouth  "daily 90 tablet 1     nortriptyline (PAMELOR) 10 MG capsule Take 1 capsule (10 mg) by mouth At Bedtime 91 capsule 3     VITAMIN D OR 1 tablet daily           Family History:  - Brother with MI at age 55   - Mother  from MI ate ge 70   - Father with MI in 60s and PCI   - No family history of valvular disorders  Family History   Problem Relation Age of Onset     Myocardial Infarction Mother 70         of MI     Breast Cancer Mother 50     Coronary Artery Disease Father 65        in his 60s     Diabetes Type 2  Father      Sleep Apnea Father         diagnosed in mid 70's     Multiple myeloma Father 78     No Known Problems Sister      Myocardial Infarction Brother 55       Social History:  - 15 drinks/week   Social History     Tobacco Use     Smoking status: Never Smoker     Smokeless tobacco: Never Used   Substance Use Topics     Alcohol use: Yes     Comment: usually a couple beers after work daily       Allergies   Allergen Reactions     No Known Drug Allergies      Seasonal Allergies        Physical Examination:  Vitals: BP (!) 146/89 (BP Location: Right arm, Patient Position: Chair, Cuff Size: Adult Regular)   Pulse 78   Ht 1.966 m (6' 5.4\")   Wt 113.7 kg (250 lb 11.2 oz)   SpO2 98%   BMI 29.42 kg/m    BMI= Body mass index is 29.42 kg/m .    GENERAL: Healthy, alert and no distress  Eyes: No xanthelasmas.  NECK: No palpable neck masses/goitre and no evidence of retrosternal goitre.   ENT: Moist mucosal membranes.  RESPIRATORY: No signs of resp distress. Lungs CTAB.  Chest pain is not reproducible upon palpation of chest wall.  CARDIOVASCULAR:  No JVD, regular, normal S1+S2 without added sounds or murmurs.  ABDOMEN: ND, soft, non-tender, normal bowel sounds.  EXTREMITIES: Warm, well-perfused, no edema.   NEUROLOGY: GCS 15/15, no focal deficits.  VASC: No carotid bruits. Carotid, radial, brachial, popliteal, dorsalis pedis and posterior tibialis pulses are normal in volumes and symmetric bilaterally. "   SKIN: No ecchymoses, no rashes.  PSYCH: Cooperative, pleasant affect.     Investigations:  I personally viewed and interpreted the following investigations:    Labs:    CBC RESULTS:  Lab Results   Component Value Date    WBC 4.3 06/09/2022    WBC 4.1 07/11/2019    RBC 5.26 06/09/2022    RBC 5.10 07/11/2019    HGB 15.2 06/09/2022    HGB 15.4 07/11/2019    HCT 45.4 06/09/2022    HCT 46.4 07/11/2019    MCV 86 06/09/2022    MCV 91 07/11/2019    MCH 28.9 06/09/2022    MCH 30.2 07/11/2019    MCHC 33.5 06/09/2022    MCHC 33.2 07/11/2019    RDW 11.8 06/09/2022    RDW 13.0 07/11/2019     06/09/2022     07/11/2019       CMP RESULTS:  Lab Results   Component Value Date     06/09/2022     07/11/2019    POTASSIUM 4.1 06/09/2022    POTASSIUM 4.9 07/11/2019    CHLORIDE 107 06/09/2022    CHLORIDE 107 07/11/2019    CO2 27 06/09/2022    CO2 28 07/11/2019    ANIONGAP 6 06/09/2022    ANIONGAP 5 07/11/2019     (H) 06/09/2022    GLC 90 07/11/2019    BUN 15 06/09/2022    BUN 17 07/11/2019    CR 0.92 06/09/2022    CR 0.89 07/11/2019    GFRESTIMATED >90 06/09/2022    GFRESTIMATED >90 07/11/2019    GFRESTBLACK >90 07/11/2019    ANNELISE 9.2 06/09/2022    ANNELISE 8.9 07/11/2019    BILITOTAL 0.7 06/09/2022    BILITOTAL 0.8 07/11/2019    ALBUMIN 3.9 06/09/2022    ALBUMIN 4.0 07/11/2019    ALKPHOS 96 06/09/2022    ALKPHOS 75 07/11/2019    ALT 34 06/09/2022    ALT 27 07/11/2019    AST 24 06/09/2022    AST 16 07/11/2019          LIPIDS:  Lab Results   Component Value Date    CHOL 231 06/09/2022    CHOL 209 07/11/2019     Lab Results   Component Value Date    HDL 57 06/09/2022    HDL 64 07/11/2019     Lab Results   Component Value Date     06/09/2022     07/11/2019     Lab Results   Component Value Date    TRIG 110 06/09/2022    TRIG 97 07/11/2019       Recent Tests:      Coronary CTA 9/15/22:                                                    IMPRESSION:  1.  Mild non-obstructive CAD.  2.  Total Agatston  score 83 placing the patient in the 81st percentile  when compared to an age- and gender-matched control group.  3.  Please review the separate Radiology report for incidental  noncardiac findings.     FINDINGS:     CORONARY CALCIUM SCORE     Total Agatston calcium score: 83   Left main: 0  Left anterior descendin  Left circumflex: 0  Right coronary artery: 4   This places the patient in the 81st  percentile when compared to an  age- and gender-matched control group.     CORONARY ANGIOGRAPHY     DOMINANCE: Co dominant system.   Normal coronary origins and course.     LEFT MAIN:   The LM is a large caliber vessel.   The left main arises normally from the left coronary cusp and is  widely patent without detectable atherosclerosis or stenosis.      LEFT ANTERIOR DESCENDING:   The LAD is a large caliber vessel. It has a type 3 morphology. It  gives rise to a moderate sized first diagonal and large second  diagonal branch.   Mid LAD: Mild (25-49%) sequential stenoses composed of mixed plaque.  Distal LAD: Minimal (<25%) stenosis composed of mixed plaque.  The remainder of the left anterior descending and its major diagonal  branches are patent with minimal luminal irregularities.     LEFT CIRCUMFLEX:   The LCx is a large caliber vessel. It gives rise to a moderate sized  first obtuse marginal and moderate sized second obtuse marginal, along  with PL branches.   Mid LCX: Minimal (<25%) stenosis composed of noncalcified plaque.  The remainder of the left circumflex and its major marginal branches  are patent with minimal luminal irregularities.     RIGHT CORONARY ARTERY:   The RCA is a large caliber vessel.    Proximal RCA: Minimal (<25%) stenosis composed of calcified plaque.  The remainder of the right coronary and the posterior descending  artery are patent with minimal luminal irregularities.     ADDITIONAL FINDINGS:      The proximal ascending aorta is normal in size.   Normal pulmonary venous anatomy. All four  pulmonary veins drain into  the left atrium.    There is no left ventricular mass or thrombus.   Normal pericardial thickness. There is no pericardial effusion.  Please review the separate Radiology report for incidental noncardiac  findings.     I have personally reviewed the examination and initial interpretation  and I agree with the findings.    ECHO 9/15/22:    Interpretation Summary  Global and regional left ventricular function is normal with an EF of 55-60%.  The right ventricle is normal size.  Global right ventricular function is normal.  The inferior vena cava was normal in size with preserved respiratory  variability.  No pericardial effusion is present.  ______________________________________________________________________________  Left Ventricle  Global and regional left ventricular function is normal with an EF of 55-60%.  Left ventricular size is normal. Relative wall thickness is increased  consistent with concentric remodeling. Left ventricular diastolic function is  normal.     Right Ventricle  The right ventricle is normal size. Global right ventricular function is  normal.     Atria  Both atria appear normal.     Mitral Valve  The mitral valve is normal. Trace mitral insufficiency is present.     Aortic Valve  Aortic valve is normal in structure and function. The aortic valve is  tricuspid.     Tricuspid Valve  The tricuspid valve is normal. Trace tricuspid insufficiency is present.  Pulmonary artery systolic pressure cannot be assessed.     Pulmonic Valve  The pulmonic valve is normal.     Vessels  The aorta root is normal. The thoracic aorta is normal. The pulmonary artery  cannot be assessed. The inferior vena cava was normal in size with preserved  respiratory variability.     Pericardium  No pericardial effusion is present.     Compared to Previous Study  Previous study not available for comparison.    Electrocardiogram (07/01/2022):  Normal sinus rhythm, normal axis, normal intervals, no  ischemic changes.    Exercise Stress Echocardiogram (07/09/2007):  No ECG evidence of ischemia.   No stress induced regional wall motion abnormalities.   Normal functional capacity.   Normal exercise echo without evidence of infarct or ischemia  Normal   functional capacity.       Stress Findings   No electrocardiographic evidence of ischemia.   Target Heart Rate was achieved.   The patient exhibited a hypertensive response with stress.       Baseline EKG/Symptoms   Nonspecific ST changes/flattening in V2-V3.       Assessment and Plan:   Fito Frye is a 51 year old male with a past medical history of hypertension, hyperlipidemia and a family history of premature coronary disease recently evaluated for atypical chest pain, found to have mild nonobstructive coronary artery disease and echo demonstrating normal biventricular function without valvular disease. Based on normal cardiac testing, suspect his chest pain and dyspnea are non cardiac in nature. However, patient does have mild coronary artery disease with CAC score of 83 thus should be treated for secondary prevention of ASCVD.     Problems:  1. Non cardiac chest pain  2. Mild nonobstructive coronary artery disease - CAC score of 83  3. Hypertension  4. Hyperlipidemia   5. Family history of premature CAD    Plan:  - Continue hydrochlorothiazide 12.5 mg once daily for HTN  - Start ASA 81 mg daily for secondary prevention of CAD  - Start atorvastatin 40 mg daily, goal LDL < 70    RTC: PCP in 3 months with lipids and hepatic panel, return to cardiology clinic as needed     A total of 30 minutes were spent on the day of the visit for chart review, care coordination, face-to-face consultation with the patient, and documentation.     ROSSI Washington, CNP  Structural Heart Nurse Practitioner  [facility]  Clinic: [phone]  Pager: [phone]    CC  Patient Care Team:  Mona Szymanski MD as PCP - General (Family  Practice)  Humphrey Logan MD as Assigned Neuroscience Provider  Mona Szymanski MD as Assigned PCP  Jose Parisi MD as Assigned Heart and Vascular Provider

## 2022-09-19 ENCOUNTER — OFFICE VISIT (OUTPATIENT)
Dept: CARDIOLOGY | Facility: CLINIC | Age: 52
End: 2022-09-19
Attending: NURSE PRACTITIONER
Payer: COMMERCIAL

## 2022-09-19 VITALS
BODY MASS INDEX: 29.6 KG/M2 | SYSTOLIC BLOOD PRESSURE: 146 MMHG | DIASTOLIC BLOOD PRESSURE: 89 MMHG | HEART RATE: 78 BPM | HEIGHT: 77 IN | WEIGHT: 250.7 LBS | OXYGEN SATURATION: 98 %

## 2022-09-19 DIAGNOSIS — I25.10 CORONARY ARTERY DISEASE INVOLVING NATIVE CORONARY ARTERY OF NATIVE HEART WITHOUT ANGINA PECTORIS: Primary | ICD-10-CM

## 2022-09-19 DIAGNOSIS — R07.89 ATYPICAL CHEST PAIN: ICD-10-CM

## 2022-09-19 DIAGNOSIS — E78.5 HYPERLIPIDEMIA LDL GOAL <70: ICD-10-CM

## 2022-09-19 PROCEDURE — G0463 HOSPITAL OUTPT CLINIC VISIT: HCPCS

## 2022-09-19 PROCEDURE — 99214 OFFICE O/P EST MOD 30 MIN: CPT | Performed by: NURSE PRACTITIONER

## 2022-09-19 RX ORDER — ATORVASTATIN CALCIUM 40 MG/1
40 TABLET, FILM COATED ORAL DAILY
Qty: 90 TABLET | Refills: 3 | Status: SHIPPED | OUTPATIENT
Start: 2022-09-19 | End: 2023-05-30

## 2022-09-19 ASSESSMENT — PAIN SCALES - GENERAL: PAINLEVEL: NO PAIN (0)

## 2022-09-19 NOTE — PATIENT INSTRUCTIONS
You were seen today in the Cardiovascular Clinic at the [facility].    Cardiology provider you saw during your visit Roberta Street NP    Start aspirin 81 mg daily and atorvastatin 40 mg daily.   2.  Continue to monitor home BP, call if > 140/90.   3.  Please make a follow-up appointment with your PCP in 3 months with repeat lipids/liver function tests prior.     Questions and [phone].   First press #1 for the University and then press #3 for Medical Questions to reach the Cardiology triage nurse.     On Call Cardiologist for after hours or on weekends: [phone], press option #4 and ask to speak to the on-call Cardiologist.     ROSSI Washington, CNP  Structural Heart Nurse Practitioner  [facility]  Clinic: [phone]

## 2022-09-19 NOTE — LETTER
9/19/2022      RE: Fito Frye  [address]  [facility] [address]       Dear Colleague,    Thank you for the opportunity to participate in the care of your patient, Fito Frye, at the [facility] [place] at [facility]. Please see a copy of my visit note below.            Chief Complaint: Follow-up coronary CTA     HPI: Fito Frye is a 51 year old male with a CAD risk factor profile: +HTN, + HLD, -DM, -smoker, + Family hx of premature CAD.     Mr. Frye was referred to Dr. Parisi July 2022 for evaluation of atypical chest pain. He described central chest tightness with radiation to bilateral arms, pain was non exertional. He also reported exertional shortness of breath and postural dizziness. He underwent coronary CTA September 2022 demonstrating mild non obstructive CAD with total Agatston score of 83, primarily located in the LAD. ECHO also performed demonstrating normal biventricular systolic function, normal diastolic function, no valvular disease.    Mr. Frye reports feeling fairly well. He continues to experience occasional fatigue and chest tightness, though seems to have improved with cooler weather. He is now using an inhaler for possible asthma and his breathing has also improved. He has pulmonary function tests scheduled for next month. He continues to experience occasional dizziness, mainly when he is feeling short of breath. Otherwise no palpitations, syncope, orthopnea, PND, leg swelling. Checks BP periodically, generally in 130s/80s.    Past Medical History:  - Hypertension  - No prior history of  T2DM, dyslipidemia, CAD, TIA/stroke, vascular aneurysms, PAD    Past Surgical History:  Past Surgical History:   Procedure Laterality Date     [facility]ESS[facility]REST  7/2007    Negative       Drug History:  Home cardiac meds: Hydrochlorothiazide 12.5 mg once daily  Current Outpatient  "Medications   Medication Sig Dispense Refill     Acetaminophen (TYLENOL PO)        albuterol (PROAIR HFA/PROVENTIL HFA/VENTOLIN HFA) 108 (90 Base) MCG/ACT inhaler Inhale 1-2 puffs into the lungs every 6 hours as needed for shortness of breath / dyspnea or wheezing 18 g 1     hydrochlorothiazide (HYDRODIURIL) 12.5 MG tablet Take 1 tablet (12.5 mg) by mouth daily 90 tablet 1     nortriptyline (PAMELOR) 10 MG capsule Take 1 capsule (10 mg) by mouth At Bedtime 91 capsule 3     VITAMIN D OR 1 tablet daily           Family History:  - Brother with MI at age 55   - Mother  from MI ate ge 70   - Father with MI in 60s and PCI   - No family history of valvular disorders  Family History   Problem Relation Age of Onset     Myocardial Infarction Mother 70         of MI     Breast Cancer Mother 50     Coronary Artery Disease Father 65        in his 60s     Diabetes Type 2  Father      Sleep Apnea Father         diagnosed in mid 70's     Multiple myeloma Father 78     No Known Problems Sister      Myocardial Infarction Brother 55       Social History:  - 15 drinks/week   Social History     Tobacco Use     Smoking status: Never Smoker     Smokeless tobacco: Never Used   Substance Use Topics     Alcohol use: Yes     Comment: usually a couple beers after work daily       Allergies   Allergen Reactions     No Known Drug Allergies      Seasonal Allergies        Physical Examination:  Vitals: BP (!) 146/89 (BP Location: Right arm, Patient Position: Chair, Cuff Size: Adult Regular)   Pulse 78   Ht 1.966 m (6' 5.4\")   Wt 113.7 kg (250 lb 11.2 oz)   SpO2 98%   BMI 29.42 kg/m    BMI= Body mass index is 29.42 kg/m .    GENERAL: Healthy, alert and no distress  Eyes: No xanthelasmas.  NECK: No palpable neck masses/goitre and no evidence of retrosternal goitre.   ENT: Moist mucosal membranes.  RESPIRATORY: No signs of resp distress. Lungs CTAB.  Chest pain is not reproducible upon palpation of chest wall.  CARDIOVASCULAR:  No " JVD, regular, normal S1+S2 without added sounds or murmurs.  ABDOMEN: ND, soft, non-tender, normal bowel sounds.  EXTREMITIES: Warm, well-perfused, no edema.   NEUROLOGY: GCS 15/15, no focal deficits.  VASC: No carotid bruits. Carotid, radial, brachial, popliteal, dorsalis pedis and posterior tibialis pulses are normal in volumes and symmetric bilaterally.   SKIN: No ecchymoses, no rashes.  PSYCH: Cooperative, pleasant affect.     Investigations:  I personally viewed and interpreted the following investigations:    Labs:    CBC RESULTS:  Lab Results   Component Value Date    WBC 4.3 06/09/2022    WBC 4.1 07/11/2019    RBC 5.26 06/09/2022    RBC 5.10 07/11/2019    HGB 15.2 06/09/2022    HGB 15.4 07/11/2019    HCT 45.4 06/09/2022    HCT 46.4 07/11/2019    MCV 86 06/09/2022    MCV 91 07/11/2019    MCH 28.9 06/09/2022    MCH 30.2 07/11/2019    MCHC 33.5 06/09/2022    MCHC 33.2 07/11/2019    RDW 11.8 06/09/2022    RDW 13.0 07/11/2019     06/09/2022     07/11/2019       CMP RESULTS:  Lab Results   Component Value Date     06/09/2022     07/11/2019    POTASSIUM 4.1 06/09/2022    POTASSIUM 4.9 07/11/2019    CHLORIDE 107 06/09/2022    CHLORIDE 107 07/11/2019    CO2 27 06/09/2022    CO2 28 07/11/2019    ANIONGAP 6 06/09/2022    ANIONGAP 5 07/11/2019     (H) 06/09/2022    GLC 90 07/11/2019    BUN 15 06/09/2022    BUN 17 07/11/2019    CR 0.92 06/09/2022    CR 0.89 07/11/2019    GFRESTIMATED >90 06/09/2022    GFRESTIMATED >90 07/11/2019    GFRESTBLACK >90 07/11/2019    ANNELISE 9.2 06/09/2022    ANNELISE 8.9 07/11/2019    BILITOTAL 0.7 06/09/2022    BILITOTAL 0.8 07/11/2019    ALBUMIN 3.9 06/09/2022    ALBUMIN 4.0 07/11/2019    ALKPHOS 96 06/09/2022    ALKPHOS 75 07/11/2019    ALT 34 06/09/2022    ALT 27 07/11/2019    AST 24 06/09/2022    AST 16 07/11/2019          LIPIDS:  Lab Results   Component Value Date    CHOL 231 06/09/2022    CHOL 209 07/11/2019     Lab Results   Component Value Date    HDL 57  2022    HDL 64 2019     Lab Results   Component Value Date     2022     2019     Lab Results   Component Value Date    TRIG 110 2022    TRIG 97 2019       Recent Tests:      Coronary CTA 9/15/22:                                                    IMPRESSION:  1.  Mild non-obstructive CAD.  2.  Total Agatston score 83 placing the patient in the 81st percentile  when compared to an age- and gender-matched control group.  3.  Please review the separate Radiology report for incidental  noncardiac findings.     FINDINGS:     CORONARY CALCIUM SCORE     Total Agatston calcium score: 83   Left main: 0  Left anterior descendin  Left circumflex: 0  Right coronary artery: 4   This places the patient in the 81st  percentile when compared to an  age- and gender-matched control group.     CORONARY ANGIOGRAPHY     DOMINANCE: Co dominant system.   Normal coronary origins and course.     LEFT MAIN:   The LM is a large caliber vessel.   The left main arises normally from the left coronary cusp and is  widely patent without detectable atherosclerosis or stenosis.      LEFT ANTERIOR DESCENDING:   The LAD is a large caliber vessel. It has a type 3 morphology. It  gives rise to a moderate sized first diagonal and large second  diagonal branch.   Mid LAD: Mild (25-49%) sequential stenoses composed of mixed plaque.  Distal LAD: Minimal (<25%) stenosis composed of mixed plaque.  The remainder of the left anterior descending and its major diagonal  branches are patent with minimal luminal irregularities.     LEFT CIRCUMFLEX:   The LCx is a large caliber vessel. It gives rise to a moderate sized  first obtuse marginal and moderate sized second obtuse marginal, along  with PL branches.   Mid LCX: Minimal (<25%) stenosis composed of noncalcified plaque.  The remainder of the left circumflex and its major marginal branches  are patent with minimal luminal irregularities.     RIGHT CORONARY  ARTERY:   The RCA is a large caliber vessel.    Proximal RCA: Minimal (<25%) stenosis composed of calcified plaque.  The remainder of the right coronary and the posterior descending  artery are patent with minimal luminal irregularities.     ADDITIONAL FINDINGS:      The proximal ascending aorta is normal in size.   Normal pulmonary venous anatomy. All four pulmonary veins drain into  the left atrium.    There is no left ventricular mass or thrombus.   Normal pericardial thickness. There is no pericardial effusion.  Please review the separate Radiology report for incidental noncardiac  findings.     I have personally reviewed the examination and initial interpretation  and I agree with the findings.    ECHO 9/15/22:    Interpretation Summary  Global and regional left ventricular function is normal with an EF of 55-60%.  The right ventricle is normal size.  Global right ventricular function is normal.  The inferior vena cava was normal in size with preserved respiratory  variability.  No pericardial effusion is present.  ______________________________________________________________________________  Left Ventricle  Global and regional left ventricular function is normal with an EF of 55-60%.  Left ventricular size is normal. Relative wall thickness is increased  consistent with concentric remodeling. Left ventricular diastolic function is  normal.     Right Ventricle  The right ventricle is normal size. Global right ventricular function is  normal.     Atria  Both atria appear normal.     Mitral Valve  The mitral valve is normal. Trace mitral insufficiency is present.     Aortic Valve  Aortic valve is normal in structure and function. The aortic valve is  tricuspid.     Tricuspid Valve  The tricuspid valve is normal. Trace tricuspid insufficiency is present.  Pulmonary artery systolic pressure cannot be assessed.     Pulmonic Valve  The pulmonic valve is normal.     Vessels  The aorta root is normal. The thoracic  aorta is normal. The pulmonary artery  cannot be assessed. The inferior vena cava was normal in size with preserved  respiratory variability.     Pericardium  No pericardial effusion is present.     Compared to Previous Study  Previous study not available for comparison.    Electrocardiogram (07/01/2022):  Normal sinus rhythm, normal axis, normal intervals, no ischemic changes.    Exercise Stress Echocardiogram (07/09/2007):  No ECG evidence of ischemia.   No stress induced regional wall motion abnormalities.   Normal functional capacity.   Normal exercise echo without evidence of infarct or ischemia  Normal   functional capacity.       Stress Findings   No electrocardiographic evidence of ischemia.   Target Heart Rate was achieved.   The patient exhibited a hypertensive response with stress.       Baseline EKG/Symptoms   Nonspecific ST changes/flattening in V2-V3.       Assessment and Plan:   Fito Frye is a 51 year old male with a past medical history of hypertension, hyperlipidemia and a family history of premature coronary disease recently evaluated for atypical chest pain, found to have mild nonobstructive coronary artery disease and echo demonstrating normal biventricular function without valvular disease. Based on normal cardiac testing, suspect his chest pain and dyspnea are non cardiac in nature. However, patient does have mild coronary artery disease with CAC score of 83 thus should be treated for secondary prevention of ASCVD.     Problems:  1. Non cardiac chest pain  2. Mild nonobstructive coronary artery disease - CAC score of 83  3. Hypertension  4. Hyperlipidemia   5. Family history of premature CAD    Plan:  - Continue hydrochlorothiazide 12.5 mg once daily for HTN  - Start ASA 81 mg daily for secondary prevention of CAD  - Start atorvastatin 40 mg daily, goal LDL < 70    RTC: PCP in 3 months with lipids and hepatic panel, return to cardiology clinic as needed     A total of 30 minutes were  spent on the day of the visit for chart review, care coordination, face-to-face consultation with the patient, and documentation.     ROSSI Washington, CNP  Structural Heart Nurse Practitioner  [facility]  Clinic: [phone]  Pager: [phone]      Patient Care Team:  Mona Szymanski MD as PCP - General (Family Practice)  Humphrey Logan MD as Assigned Neuroscience Provider  Mona Szymanski MD as Assigned PCP  Jose Parisi MD as Assigned Heart and Vascular Provider        Please do not hesitate to contact me if you have any questions/concerns.     Sincerely,     Roberta Street, ANDREW

## 2022-09-19 NOTE — LETTER
9/19/2022       RE: Fito Frye  [address]  [facility] [address]     Dear Colleague,    Thank you for referring your patient, Fito Frye, to the [facility] [place] at [facility]. Please see a copy of my visit note below.            Chief Complaint: Follow-up coronary CTA     HPI: Fito Frye is a 51 year old male with a CAD risk factor profile: +HTN, + HLD, -DM, -smoker, + Family hx of premature CAD.     Mr. Frye was referred to Dr. Parisi July 2022 for evaluation of atypical chest pain. He described central chest tightness with radiation to bilateral arms, pain was non exertional. He also reported exertional shortness of breath and postural dizziness. He underwent coronary CTA September 2022 demonstrating mild non obstructive CAD with total Agatston score of 83, primarily located in the LAD. ECHO also performed demonstrating normal biventricular systolic function, normal diastolic function, no valvular disease.    Mr. Frye reports feeling fairly well. He continues to experience occasional fatigue and chest tightness, though seems to have improved with cooler weather. He is now using an inhaler for possible asthma and his breathing has also improved. He has pulmonary function tests scheduled for next month. He continues to experience occasional dizziness, mainly when he is feeling short of breath. Otherwise no palpitations, syncope, orthopnea, PND, leg swelling. Checks BP periodically, generally in 130s/80s.    Past Medical History:  - Hypertension  - No prior history of  T2DM, dyslipidemia, CAD, TIA/stroke, vascular aneurysms, PAD    Past Surgical History:  Past Surgical History:   Procedure Laterality Date     [facility] ECHO HEART XTHORACIC, STRESS/REST  7/2007    Negative       Drug History:  Home cardiac meds: Hydrochlorothiazide 12.5 mg once daily  Current Outpatient Medications   Medication Sig Dispense Refill  "    Acetaminophen (TYLENOL PO)        albuterol (PROAIR HFA/PROVENTIL HFA/VENTOLIN HFA) 108 (90 Base) MCG/ACT inhaler Inhale 1-2 puffs into the lungs every 6 hours as needed for shortness of breath / dyspnea or wheezing 18 g 1     hydrochlorothiazide (HYDRODIURIL) 12.5 MG tablet Take 1 tablet (12.5 mg) by mouth daily 90 tablet 1     nortriptyline (PAMELOR) 10 MG capsule Take 1 capsule (10 mg) by mouth At Bedtime 91 capsule 3     VITAMIN D OR 1 tablet daily           Family History:  - Brother with MI at age 55   - Mother  from MI ate ge 70   - Father with MI in 60s and PCI   - No family history of valvular disorders  Family History   Problem Relation Age of Onset     Myocardial Infarction Mother 70         of MI     Breast Cancer Mother 50     Coronary Artery Disease Father 65        in his 60s     Diabetes Type 2  Father      Sleep Apnea Father         diagnosed in mid 70's     Multiple myeloma Father 78     No Known Problems Sister      Myocardial Infarction Brother 55       Social History:  - 15 drinks/week   Social History     Tobacco Use     Smoking status: Never Smoker     Smokeless tobacco: Never Used   Substance Use Topics     Alcohol use: Yes     Comment: usually a couple beers after work daily       Allergies   Allergen Reactions     No Known Drug Allergies      Seasonal Allergies        Physical Examination:  Vitals: BP (!) 146/89 (BP Location: Right arm, Patient Position: Chair, Cuff Size: Adult Regular)   Pulse 78   Ht 1.966 m (6' 5.4\")   Wt 113.7 kg (250 lb 11.2 oz)   SpO2 98%   BMI 29.42 kg/m    BMI= Body mass index is 29.42 kg/m .    GENERAL: Healthy, alert and no distress  Eyes: No xanthelasmas.  NECK: No palpable neck masses/goitre and no evidence of retrosternal goitre.   ENT: Moist mucosal membranes.  RESPIRATORY: No signs of resp distress. Lungs CTAB.  Chest pain is not reproducible upon palpation of chest wall.  CARDIOVASCULAR:  No JVD, regular, normal S1+S2 without added " sounds or murmurs.  ABDOMEN: ND, soft, non-tender, normal bowel sounds.  EXTREMITIES: Warm, well-perfused, no edema.   NEUROLOGY: GCS 15/15, no focal deficits.  VASC: No carotid bruits. Carotid, radial, brachial, popliteal, dorsalis pedis and posterior tibialis pulses are normal in volumes and symmetric bilaterally.   SKIN: No ecchymoses, no rashes.  PSYCH: Cooperative, pleasant affect.     Investigations:  I personally viewed and interpreted the following investigations:    Labs:    CBC RESULTS:  Lab Results   Component Value Date    WBC 4.3 06/09/2022    WBC 4.1 07/11/2019    RBC 5.26 06/09/2022    RBC 5.10 07/11/2019    HGB 15.2 06/09/2022    HGB 15.4 07/11/2019    HCT 45.4 06/09/2022    HCT 46.4 07/11/2019    MCV 86 06/09/2022    MCV 91 07/11/2019    MCH 28.9 06/09/2022    MCH 30.2 07/11/2019    MCHC 33.5 06/09/2022    MCHC 33.2 07/11/2019    RDW 11.8 06/09/2022    RDW 13.0 07/11/2019     06/09/2022     07/11/2019       CMP RESULTS:  Lab Results   Component Value Date     06/09/2022     07/11/2019    POTASSIUM 4.1 06/09/2022    POTASSIUM 4.9 07/11/2019    CHLORIDE 107 06/09/2022    CHLORIDE 107 07/11/2019    CO2 27 06/09/2022    CO2 28 07/11/2019    ANIONGAP 6 06/09/2022    ANIONGAP 5 07/11/2019     (H) 06/09/2022    GLC 90 07/11/2019    BUN 15 06/09/2022    BUN 17 07/11/2019    CR 0.92 06/09/2022    CR 0.89 07/11/2019    GFRESTIMATED >90 06/09/2022    GFRESTIMATED >90 07/11/2019    GFRESTBLACK >90 07/11/2019    ANNELISE 9.2 06/09/2022    ANNELISE 8.9 07/11/2019    BILITOTAL 0.7 06/09/2022    BILITOTAL 0.8 07/11/2019    ALBUMIN 3.9 06/09/2022    ALBUMIN 4.0 07/11/2019    ALKPHOS 96 06/09/2022    ALKPHOS 75 07/11/2019    ALT 34 06/09/2022    ALT 27 07/11/2019    AST 24 06/09/2022    AST 16 07/11/2019          LIPIDS:  Lab Results   Component Value Date    CHOL 231 06/09/2022    CHOL 209 07/11/2019     Lab Results   Component Value Date    HDL 57 06/09/2022    HDL 64 07/11/2019     Lab  Results   Component Value Date     2022     2019     Lab Results   Component Value Date    TRIG 110 2022    TRIG 97 2019       Recent Tests:      Coronary CTA 9/15/22:                                                    IMPRESSION:  1.  Mild non-obstructive CAD.  2.  Total Agatston score 83 placing the patient in the 81st percentile  when compared to an age- and gender-matched control group.  3.  Please review the separate Radiology report for incidental  noncardiac findings.     FINDINGS:     CORONARY CALCIUM SCORE     Total Agatston calcium score: 83   Left main: 0  Left anterior descendin  Left circumflex: 0  Right coronary artery: 4   This places the patient in the 81st  percentile when compared to an  age- and gender-matched control group.     CORONARY ANGIOGRAPHY     DOMINANCE: Co dominant system.   Normal coronary origins and course.     LEFT MAIN:   The LM is a large caliber vessel.   The left main arises normally from the left coronary cusp and is  widely patent without detectable atherosclerosis or stenosis.      LEFT ANTERIOR DESCENDING:   The LAD is a large caliber vessel. It has a type 3 morphology. It  gives rise to a moderate sized first diagonal and large second  diagonal branch.   Mid LAD: Mild (25-49%) sequential stenoses composed of mixed plaque.  Distal LAD: Minimal (<25%) stenosis composed of mixed plaque.  The remainder of the left anterior descending and its major diagonal  branches are patent with minimal luminal irregularities.     LEFT CIRCUMFLEX:   The LCx is a large caliber vessel. It gives rise to a moderate sized  first obtuse marginal and moderate sized second obtuse marginal, along  with PL branches.   Mid LCX: Minimal (<25%) stenosis composed of noncalcified plaque.  The remainder of the left circumflex and its major marginal branches  are patent with minimal luminal irregularities.     RIGHT CORONARY ARTERY:   The RCA is a large caliber  vessel.    Proximal RCA: Minimal (<25%) stenosis composed of calcified plaque.  The remainder of the right coronary and the posterior descending  artery are patent with minimal luminal irregularities.     ADDITIONAL FINDINGS:      The proximal ascending aorta is normal in size.   Normal pulmonary venous anatomy. All four pulmonary veins drain into  the left atrium.    There is no left ventricular mass or thrombus.   Normal pericardial thickness. There is no pericardial effusion.  Please review the separate Radiology report for incidental noncardiac  findings.     I have personally reviewed the examination and initial interpretation  and I agree with the findings.    ECHO 9/15/22:    Interpretation Summary  Global and regional left ventricular function is normal with an EF of 55-60%.  The right ventricle is normal size.  Global right ventricular function is normal.  The inferior vena cava was normal in size with preserved respiratory  variability.  No pericardial effusion is present.  ______________________________________________________________________________  Left Ventricle  Global and regional left ventricular function is normal with an EF of 55-60%.  Left ventricular size is normal. Relative wall thickness is increased  consistent with concentric remodeling. Left ventricular diastolic function is  normal.     Right Ventricle  The right ventricle is normal size. Global right ventricular function is  normal.     Atria  Both atria appear normal.     Mitral Valve  The mitral valve is normal. Trace mitral insufficiency is present.     Aortic Valve  Aortic valve is normal in structure and function. The aortic valve is  tricuspid.     Tricuspid Valve  The tricuspid valve is normal. Trace tricuspid insufficiency is present.  Pulmonary artery systolic pressure cannot be assessed.     Pulmonic Valve  The pulmonic valve is normal.     Vessels  The aorta root is normal. The thoracic aorta is normal. The pulmonary  artery  cannot be assessed. The inferior vena cava was normal in size with preserved  respiratory variability.     Pericardium  No pericardial effusion is present.     Compared to Previous Study  Previous study not available for comparison.    Electrocardiogram (07/01/2022):  Normal sinus rhythm, normal axis, normal intervals, no ischemic changes.    Exercise Stress Echocardiogram (07/09/2007):  No ECG evidence of ischemia.   No stress induced regional wall motion abnormalities.   Normal functional capacity.   Normal exercise echo without evidence of infarct or ischemia  Normal   functional capacity.       Stress Findings   No electrocardiographic evidence of ischemia.   Target Heart Rate was achieved.   The patient exhibited a hypertensive response with stress.       Baseline EKG/Symptoms   Nonspecific ST changes/flattening in V2-V3.       Assessment and Plan:   Fito Frye is a 51 year old male with a past medical history of hypertension, hyperlipidemia and a family history of premature coronary disease recently evaluated for atypical chest pain, found to have mild nonobstructive coronary artery disease and echo demonstrating normal biventricular function without valvular disease. Based on normal cardiac testing, suspect his chest pain and dyspnea are non cardiac in nature. However, patient does have mild coronary artery disease with CAC score of 83 thus should be treated for secondary prevention of ASCVD.     Problems:  1. Non cardiac chest pain  2. Mild nonobstructive coronary artery disease - CAC score of 83  3. Hypertension  4. Hyperlipidemia   5. Family history of premature CAD    Plan:  - Continue hydrochlorothiazide 12.5 mg once daily for HTN  - Start ASA 81 mg daily for secondary prevention of CAD  - Start atorvastatin 40 mg daily, goal LDL < 70    RTC: PCP in 3 months with lipids and hepatic panel, return to cardiology clinic as needed     A total of 30 minutes were spent on the day of the visit for  chart review, care coordination, face-to-face consultation with the patient, and documentation.     ROSSI Washington, CNP  Structural Heart Nurse Practitioner  [facility] [place]  Clinic: [phone]  Pager: [phone]    CC  Patient Care Team:  Mona Szymanski MD as PCP - General (Family Practice)  Humphrey Logan MD as Assigned Neuroscience Provider  Mona Szymanski MD as Assigned PCP  Jose Parisi MD as Assigned Heart and Vascular Provider

## 2022-09-19 NOTE — NURSING NOTE
Chief Complaint   Patient presents with     Follow Up     51 year old male with a past medical history of hypertension who was referred to cardiology for evaluation of hypertension and atypical chest pain by Dr. Mona Szymanski. Here for follow-up on recent imaging studies.       Vitals were taken and medications reconciled.    Shamir Fletcher, EMT  8:22 AM

## 2022-10-12 ENCOUNTER — LAB (OUTPATIENT)
Dept: LAB | Facility: CLINIC | Age: 52
End: 2022-10-12
Payer: COMMERCIAL

## 2022-10-12 ENCOUNTER — ANCILLARY PROCEDURE (OUTPATIENT)
Dept: GENERAL RADIOLOGY | Facility: CLINIC | Age: 52
End: 2022-10-12
Attending: INTERNAL MEDICINE
Payer: COMMERCIAL

## 2022-10-12 ENCOUNTER — PRE VISIT (OUTPATIENT)
Dept: PULMONOLOGY | Facility: CLINIC | Age: 52
End: 2022-10-12

## 2022-10-12 ENCOUNTER — OFFICE VISIT (OUTPATIENT)
Dept: PULMONOLOGY | Facility: CLINIC | Age: 52
End: 2022-10-12
Attending: FAMILY MEDICINE
Payer: COMMERCIAL

## 2022-10-12 VITALS — SYSTOLIC BLOOD PRESSURE: 190 MMHG | HEART RATE: 92 BPM | OXYGEN SATURATION: 95 % | DIASTOLIC BLOOD PRESSURE: 104 MMHG

## 2022-10-12 DIAGNOSIS — I25.10 CORONARY ARTERY DISEASE INVOLVING NATIVE CORONARY ARTERY OF NATIVE HEART WITHOUT ANGINA PECTORIS: ICD-10-CM

## 2022-10-12 DIAGNOSIS — E78.5 HYPERLIPIDEMIA LDL GOAL <70: ICD-10-CM

## 2022-10-12 DIAGNOSIS — J45.50 SEVERE PERSISTENT ASTHMA WITHOUT COMPLICATION (H): ICD-10-CM

## 2022-10-12 DIAGNOSIS — R06.2 WHEEZING: ICD-10-CM

## 2022-10-12 DIAGNOSIS — R06.02 SHORTNESS OF BREATH: ICD-10-CM

## 2022-10-12 DIAGNOSIS — R07.89 ATYPICAL CHEST PAIN: ICD-10-CM

## 2022-10-12 DIAGNOSIS — Z11.4 SCREENING FOR HIV (HUMAN IMMUNODEFICIENCY VIRUS): ICD-10-CM

## 2022-10-12 DIAGNOSIS — Z11.59 NEED FOR HEPATITIS C SCREENING TEST: ICD-10-CM

## 2022-10-12 DIAGNOSIS — R07.9 CHEST PAIN: ICD-10-CM

## 2022-10-12 DIAGNOSIS — J45.50 SEVERE PERSISTENT ASTHMA WITHOUT COMPLICATION (H): Primary | ICD-10-CM

## 2022-10-12 DIAGNOSIS — R06.02 SOB (SHORTNESS OF BREATH): ICD-10-CM

## 2022-10-12 LAB
ALBUMIN SERPL BCG-MCNC: 4.4 G/DL (ref 3.5–5.2)
ALP SERPL-CCNC: 113 U/L (ref 40–129)
ALT SERPL W P-5'-P-CCNC: 54 U/L (ref 10–50)
AST SERPL W P-5'-P-CCNC: 49 U/L (ref 10–50)
BASOPHILS # BLD AUTO: 0 10E3/UL (ref 0–0.2)
BASOPHILS NFR BLD AUTO: 0 %
BILIRUB DIRECT SERPL-MCNC: <0.2 MG/DL (ref 0–0.3)
BILIRUB SERPL-MCNC: 0.7 MG/DL
CHOLEST SERPL-MCNC: 176 MG/DL
CK SERPL-CCNC: 406 U/L (ref 39–308)
CRP SERPL-MCNC: <3 MG/L
EOSINOPHIL # BLD AUTO: 0.1 10E3/UL (ref 0–0.7)
EOSINOPHIL NFR BLD AUTO: 1 %
ERYTHROCYTE [DISTWIDTH] IN BLOOD BY AUTOMATED COUNT: 11.8 % (ref 10–15)
ERYTHROCYTE [SEDIMENTATION RATE] IN BLOOD BY WESTERGREN METHOD: 7 MM/HR (ref 0–20)
HCT VFR BLD AUTO: 46.7 % (ref 40–53)
HDLC SERPL-MCNC: 57 MG/DL
HGB BLD-MCNC: 15.8 G/DL (ref 13.3–17.7)
IMM GRANULOCYTES # BLD: 0 10E3/UL
IMM GRANULOCYTES NFR BLD: 0 %
LDLC SERPL CALC-MCNC: 85 MG/DL
LYMPHOCYTES # BLD AUTO: 1.8 10E3/UL (ref 0.8–5.3)
LYMPHOCYTES NFR BLD AUTO: 22 %
MCH RBC QN AUTO: 29.4 PG (ref 26.5–33)
MCHC RBC AUTO-ENTMCNC: 33.8 G/DL (ref 31.5–36.5)
MCV RBC AUTO: 87 FL (ref 78–100)
MONOCYTES # BLD AUTO: 0.8 10E3/UL (ref 0–1.3)
MONOCYTES NFR BLD AUTO: 9 %
NEUTROPHILS # BLD AUTO: 5.5 10E3/UL (ref 1.6–8.3)
NEUTROPHILS NFR BLD AUTO: 68 %
NONHDLC SERPL-MCNC: 119 MG/DL
NRBC # BLD AUTO: 0 10E3/UL
NRBC BLD AUTO-RTO: 0 /100
PLATELET # BLD AUTO: 294 10E3/UL (ref 150–450)
PROT SERPL-MCNC: 8.2 G/DL (ref 6.4–8.3)
RBC # BLD AUTO: 5.37 10E6/UL (ref 4.4–5.9)
TRIGL SERPL-MCNC: 170 MG/DL
WBC # BLD AUTO: 8.2 10E3/UL (ref 4–11)

## 2022-10-12 PROCEDURE — 36415 COLL VENOUS BLD VENIPUNCTURE: CPT | Performed by: PATHOLOGY

## 2022-10-12 PROCEDURE — 99214 OFFICE O/P EST MOD 30 MIN: CPT | Mod: 25 | Performed by: INTERNAL MEDICINE

## 2022-10-12 PROCEDURE — 80076 HEPATIC FUNCTION PANEL: CPT | Performed by: PATHOLOGY

## 2022-10-12 PROCEDURE — 71046 X-RAY EXAM CHEST 2 VIEWS: CPT | Mod: GC | Performed by: RADIOLOGY

## 2022-10-12 PROCEDURE — 80061 LIPID PANEL: CPT | Mod: 90 | Performed by: PATHOLOGY

## 2022-10-12 PROCEDURE — G0463 HOSPITAL OUTPT CLINIC VISIT: HCPCS | Mod: 25

## 2022-10-12 PROCEDURE — 86803 HEPATITIS C AB TEST: CPT | Mod: 90 | Performed by: PATHOLOGY

## 2022-10-12 PROCEDURE — 87389 HIV-1 AG W/HIV-1&-2 AB AG IA: CPT | Mod: 90 | Performed by: PATHOLOGY

## 2022-10-12 PROCEDURE — 94729 DIFFUSING CAPACITY: CPT | Performed by: INTERNAL MEDICINE

## 2022-10-12 PROCEDURE — 94060 EVALUATION OF WHEEZING: CPT | Performed by: INTERNAL MEDICINE

## 2022-10-12 RX ORDER — BUDESONIDE AND FORMOTEROL FUMARATE DIHYDRATE 160; 4.5 UG/1; UG/1
2 AEROSOL RESPIRATORY (INHALATION) 2 TIMES DAILY
Qty: 1 G | Refills: 11 | Status: SHIPPED | OUTPATIENT
Start: 2022-10-12 | End: 2023-02-27

## 2022-10-12 ASSESSMENT — PAIN SCALES - GENERAL: PAINLEVEL: NO PAIN (0)

## 2022-10-12 NOTE — PATIENT INSTRUCTIONS
Start Symbicort 2 puffs twice daily   Continue the albuterol as needed for shortness of breath and wheezing   We ordered CT scan of the chest  We ordered blood work   Follow up in 3 months

## 2022-10-12 NOTE — LETTER
10/12/2022         RE: Fito Frye  [address]  [facility] [address]        Dear Colleague,    Thank you for referring your patient, Fito Frye, to the [facility] [place]. Please see a copy of my visit note below.    Pulmonary Clinic Note   10/12/2022      PCP: Mona Szymanski    Reason for visit: chest pain and dyspnea.    Pulmonary HPI:   Fito Frye is a 51 year old male w/ h/o hypertension and family history CAD, who presents for evaluation of chest pain and dyspnea.  Patient reported that he has been having intermittent shortness of breath for the last to 3 years, this has been getting worse over the last few months.  He had occasional intermittent wheezing.  No significant cough or sputum production.  His shortness of breath episodes are self-limited, he did notice some improvement with albuterol inhaler, but that does not resolve the symptoms completely.  He also has nocturnal symptoms on a daily basis.  The patient denied having sinus congestion, no postnasal drip.  He endorsed having significant reflux with heartburn, he uses frequent antacids like Tums.  He denied rash, denied fever, no chills, he had fluctuation in his weight, but no significant unintentional weight loss.  No lymphadenopathy.  He endorsed having some muscle cramps in the proximal muscles including his shoulders and hips that is accompanied by occasional weakness.  No joint pain, no numbness or tingling.  Notably the patient endorsed occasional head, he takes nortriptyline for that, he also endorsed having frequent fogginess and dizziness, he has had significant cardiac work-up which was unrevealing.    Patient is a never smoker.  No excessive EtOH use.  No vaping or recreational drug use.   Patient has 2 pets, a dog and a cat.  He endorsed history for cats and dogs allergy when he was a child, but no history of asthma during his childhood.  Patient lives  in an old house that was built in 1925, he had a recent work renovation last year on his kitchen, he did a lot of the work in the kitchen, but he said he wore N95, he does not recall seeing mold or asbestos.  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     Patient's outside records were reviewed via [facility] Everywhere &/or available paper records (sent for scanning).     Review of systems: a complete 12-point ROS conducted, & found to be negative w/ exceptions as noted in the HPI.    Past medical history:  Past Medical History:   Diagnosis Date     NO ACTIVE PROBLEMS        Past Surgical History:   Procedure Laterality Date     [facility] ECHO HEART XTHORACIC, STRESS/REST  2007    Negative       Social history:  Social History     Tobacco Use     Smoking status: Never     Smokeless tobacco: Never   Substance Use Topics     Alcohol use: Yes     Comment: usually a couple beers after work daily     Drug use: No       Family history:  Family History   Problem Relation Age of Onset     Myocardial Infarction Mother 70         of MI     Breast Cancer Mother 50     Coronary Artery Disease Father 65        in his 60s     Diabetes Type 2  Father      Sleep Apnea Father         diagnosed in mid 70's     Multiple myeloma Father 78     No Known Problems Sister      Myocardial Infarction Brother 55       Medications:  Current Outpatient Medications   Medication     Acetaminophen (TYLENOL PO)     albuterol (PROAIR HFA/PROVENTIL HFA/VENTOLIN HFA) 108 (90 Base) MCG/ACT inhaler     aspirin (ASA) 81 MG EC tablet     atorvastatin (LIPITOR) 40 MG tablet     budesonide-formoterol (SYMBICORT) 160-4.5 MCG/ACT Inhaler     hydrochlorothiazide (HYDRODIURIL) 12.5 MG tablet     nortriptyline (PAMELOR) 10 MG capsule     VITAMIN D OR     No current facility-administered medications for this visit.       Allergies:  Allergies   Allergen Reactions     No Known Drug Allergies      Seasonal  Allergies        Physical examination:  BP (!) 190/104   Pulse 92   SpO2 95%     General: NAD  Eyes: Anicteric sclera, PERRL, EOMI  Mouth: MMM w/o lesions; no tonsillar enlargement; no oropharyngeal exudate, or erythema  Neck: No masses, no thyromegaly  Lymphatics: No significant cervical or supraclavicular LNs  CV: RRR, no m/c/r;   Lungs: Clear to auscultation bilaterally   Abd: Soft, NT, ND  Ext: WWP, no BLE edema. No clubbing  Skin: No rashes, cyanosis, or jaundice  Neuro: Intact mentation w/ normal speech. Normal strength & muscle tone w/ normal gait & stance  Psych: Euthymic, normal affect, good eye contact    Labs: reviewed in [employer] & personally interpreted.       Imaging: reviewed in [employer] & personally interpreted. Below are the interpretations from the formal Radiology review.  CXR 10/12/2022  IMPRESSION: No acute cardiopulmonary findings.    PFT:  10/12/2022, showed nonspecific spirometry with normal lung volumes, significant response to bronchodilator, air trapping is suggested by increased RV.  Normal diffusion capacity.      Impression & recommendations:    Fito was seen today for new patient.    Diagnoses and all orders for this visit:    Severe persistent asthma without complication  -     CBC with Platelets & Differential; Future  -     IgE; Future  -     ANCA IgG by IFA with Reflex to Titer; Future  -     Anti Nuclear Bindu IgG by IFA with Reflex; Future  -     Erythrocyte sedimentation rate auto; Future  -     CRP inflammation; Future  -     Aldolase; Future  -     CK total; Future  -     Whit 1 Antibody IgG; Future  -     CT Chest Hi-Resolution wo Contrast; Future  -     Hobbs Respiratory Allergen Panel; Future  -     Hypersensitivity Pneumonitis 2; Future  -     Hypersensitivity pneumonitis; Future  -     budesonide-formoterol (SYMBICORT) 160-4.5 MCG/ACT Inhaler; Inhale 2 puffs into the lungs 2 times daily for 30 days    Atypical chest pain  -     Adult Pulmonary Medicine Referral    Wheezing  -      Adult Pulmonary Medicine Referral    SOB (shortness of breath)  -     Adult Pulmonary Medicine Referral  -     CT Chest Hi-Resolution wo Contrast; Future      This is a 51-year-old male patient with past medical history significant for hypertension, and headache.  The patient was referred to the pulmonary clinic due to increased shortness of breath over the last few month, but he has been having shortness of breath for 2 3 years now.  His symptoms are very consistent with asthma, interestingly his PFT showed significant response to bronchodilator which supports asthma diagnosis.  The patient had other symptoms that could be nonspecific but also could be concerning for inflammatory syndromes, though symptoms include the chronic headache, the myalgia muscle weakness, and the dizziness.  In addition to treating his asthma with inhaler regimen, I will order work-up for inflammatory disorders such as ESR, CRP, ANCA vasculitis, and myositis.  Although his chest x-ray does not show significant abnormality, but proximal airway disease cannot be ruled out based on that so we will order high-resolution CT scan.    #1 persistent severe asthma, no exacerbation  -Start high-dose ICS and LABA with Symbicort 2 puffs twice daily, continue albuterol as needed  -Order CBC with differential, IgE, ANCA, GLENROY, ESR, CRP, CK, aldolase, and anti-Whit 1  -Order hypersensitivity pneumonitis panel  -Order high-resolution CT scan with inspiratory and expiratory views  -Follow-up in 3 months      Chart documentation was completed, in part, with [employer] voice-recognition software. Even though reviewed, some grammatical, spelling, and word errors may remain.      Patient was seen & discussed w/ Dr. Jean-Claude MD, who is in agreement.      Dorita Schaeffer MD   Pulmonary and Critical Care Fellow         Attestation signed by Teetee Bermeo MD at 10/13/2022  4:21 PM:  Pulmonary Staff:  I have discussed Mr. Frye's case with   Laury-Pulmonary/Critical Care Fellow; personally reviewed the patient's available radiographic imaging and PFT data and met with him.  I agree with the findings, assessment and recommendations as outlined below by Dr. Schaeffer.      Teetee Bermeo MD  #[id number]  10/12/2022      Again, thank you for allowing me to participate in the care of your patient.        Sincerely,        Dorita Schaeffer MD

## 2022-10-12 NOTE — PROGRESS NOTES
Pulmonary Clinic Note   10/12/2022      PCP: Mona Szymanski    Reason for visit: chest pain and dyspnea.    Pulmonary HPI:   Fito Frye is a 51 year old male w/ h/o hypertension and family history CAD, who presents for evaluation of chest pain and dyspnea.  Patient reported that he has been having intermittent shortness of breath for the last to 3 years, this has been getting worse over the last few months.  He had occasional intermittent wheezing.  No significant cough or sputum production.  His shortness of breath episodes are self-limited, he did notice some improvement with albuterol inhaler, but that does not resolve the symptoms completely.  He also has nocturnal symptoms on a daily basis.  The patient denied having sinus congestion, no postnasal drip.  He endorsed having significant reflux with heartburn, he uses frequent antacids like Tums.  He denied rash, denied fever, no chills, he had fluctuation in his weight, but no significant unintentional weight loss.  No lymphadenopathy.  He endorsed having some muscle cramps in the proximal muscles including his shoulders and hips that is accompanied by occasional weakness.  No joint pain, no numbness or tingling.  Notably the patient endorsed occasional head, he takes nortriptyline for that, he also endorsed having frequent fogginess and dizziness, he has had significant cardiac work-up which was unrevealing.    Patient is a never smoker.  No excessive EtOH use.  No vaping or recreational drug use.   Patient has 2 pets, a dog and a cat.  He endorsed history for cats and dogs allergy when he was a child, but no history of asthma during his childhood.  Patient lives in an old house that was built in 1925, he had a recent work renovation last year on his kitchen, he did a lot of the work in the kitchen, but he said he wore N95, he does not recall seeing mold or asbestos.  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     Patient's outside records were reviewed via [facility] &/or available paper records (sent for scanning).     Review of systems: a complete 12-point ROS conducted, & found to be negative w/ exceptions as noted in the HPI.    Past medical history:  Past Medical History:   Diagnosis Date     NO ACTIVE PROBLEMS        Past Surgical History:   Procedure Laterality Date     [facility] ECHO HEART XTHORACIC, STRESS/REST  2007    Negative       Social history:  Social History     Tobacco Use     Smoking status: Never     Smokeless tobacco: Never   Substance Use Topics     Alcohol use: Yes     Comment: usually a couple beers after work daily     Drug use: No       Family history:  Family History   Problem Relation Age of Onset     Myocardial Infarction Mother 70         of MI     Breast Cancer Mother 50     Coronary Artery Disease Father 65        in his 60s     Diabetes Type 2  Father      Sleep Apnea Father         diagnosed in mid 70's     Multiple myeloma Father 78     No Known Problems Sister      Myocardial Infarction Brother 55       Medications:  Current Outpatient Medications   Medication     Acetaminophen (TYLENOL PO)     albuterol (PROAIR HFA/PROVENTIL HFA/VENTOLIN HFA) 108 (90 Base) MCG/ACT inhaler     aspirin (ASA) 81 MG EC tablet     atorvastatin (LIPITOR) 40 MG tablet     budesonide-formoterol (SYMBICORT) 160-4.5 MCG/ACT Inhaler     hydrochlorothiazide (HYDRODIURIL) 12.5 MG tablet     nortriptyline (PAMELOR) 10 MG capsule     VITAMIN D OR     No current facility-administered medications for this visit.       Allergies:  Allergies   Allergen Reactions     No Known Drug Allergies      Seasonal Allergies        Physical examination:  BP (!) 190/104   Pulse 92   SpO2 95%     General: NAD  Eyes: Anicteric sclera, PERRL, EOMI  Mouth: MMM w/o lesions; no tonsillar enlargement; no oropharyngeal exudate, or erythema  Neck: No masses, no thyromegaly  Lymphatics: No significant cervical or  supraclavicular LNs  CV: RRR, no m/c/r;   Lungs: Clear to auscultation bilaterally   Abd: Soft, NT, ND  Ext: WWP, no BLE edema. No clubbing  Skin: No rashes, cyanosis, or jaundice  Neuro: Intact mentation w/ normal speech. Normal strength & muscle tone w/ normal gait & stance  Psych: Euthymic, normal affect, good eye contact    Labs: reviewed in [facility] & personally interpreted.       Imaging: reviewed in [facility] & personally interpreted. Below are the interpretations from the formal Radiology review.  CXR 10/12/2022  IMPRESSION: No acute cardiopulmonary findings.    PFT:  10/12/2022, showed nonspecific spirometry with normal lung volumes, significant response to bronchodilator, air trapping is suggested by increased RV.  Normal diffusion capacity.      Impression & recommendations:    Fito was seen today for new patient.    Diagnoses and all orders for this visit:    Severe persistent asthma without complication  -     CBC with Platelets & Differential; Future  -     IgE; Future  -     ANCA IgG by IFA with Reflex to Titer; Future  -     Anti Nuclear Bindu IgG by IFA with Reflex; Future  -     Erythrocyte sedimentation rate auto; Future  -     CRP inflammation; Future  -     Aldolase; Future  -     CK total; Future  -     Whit 1 Antibody IgG; Future  -     CT Chest Hi-Resolution wo Contrast; Future  -     [place] Respiratory Allergen Panel; Future  -     Hypersensitivity Pneumonitis 2; Future  -     Hypersensitivity pneumonitis; Future  -     budesonide-formoterol (SYMBICORT) 160-4.5 MCG/ACT Inhaler; Inhale 2 puffs into the lungs 2 times daily for 30 days    Atypical chest pain  -     Adult Pulmonary Medicine Referral    Wheezing  -     Adult Pulmonary Medicine Referral    SOB (shortness of breath)  -     Adult Pulmonary Medicine Referral  -     CT Chest Hi-Resolution wo Contrast; Future      This is a 51-year-old male patient with past medical history significant for hypertension, and headache.  The patient was referred to  the pulmonary clinic due to increased shortness of breath over the last few month, but he has been having shortness of breath for 2 3 years now.  His symptoms are very consistent with asthma, interestingly his PFT showed significant response to bronchodilator which supports asthma diagnosis.  The patient had other symptoms that could be nonspecific but also could be concerning for inflammatory syndromes, though symptoms include the chronic headache, the myalgia muscle weakness, and the dizziness.  In addition to treating his asthma with inhaler regimen, I will order work-up for inflammatory disorders such as ESR, CRP, ANCA vasculitis, and myositis.  Although his chest x-ray does not show significant abnormality, but proximal airway disease cannot be ruled out based on that so we will order high-resolution CT scan.    #1 persistent severe asthma, no exacerbation  -Start high-dose ICS and LABA with Symbicort 2 puffs twice daily, continue albuterol as needed  -Order CBC with differential, IgE, ANCA, GLENROY, ESR, CRP, CK, aldolase, and anti-Whit 1  -Order hypersensitivity pneumonitis panel  -Order high-resolution CT scan with inspiratory and expiratory views  -Follow-up in 3 months      Chart documentation was completed, in part, with [employer] voice-recognition software. Even though reviewed, some grammatical, spelling, and word errors may remain.      Patient was seen & discussed w/ Dr. Jean-Claude MD, who is in agreement.      Dorita Schaeffer MD   Pulmonary and Critical Care Fellow

## 2022-10-12 NOTE — NURSING NOTE
Chief Complaint   Patient presents with     New Patient     New wheezing and SOB      Vitals were taken and medications were reconciled.     Haleigh Barlow RMA  3:27 PM

## 2022-10-13 ENCOUNTER — ANCILLARY PROCEDURE (OUTPATIENT)
Dept: CT IMAGING | Facility: CLINIC | Age: 52
End: 2022-10-13
Attending: INTERNAL MEDICINE
Payer: COMMERCIAL

## 2022-10-13 DIAGNOSIS — J45.50 SEVERE PERSISTENT ASTHMA WITHOUT COMPLICATION (H): ICD-10-CM

## 2022-10-13 DIAGNOSIS — R06.02 SOB (SHORTNESS OF BREATH): ICD-10-CM

## 2022-10-13 LAB
A ALTERNATA IGE QN: <0.1 KU(A)/L
A FUMIGATUS IGE QN: <0.1 KU(A)/L
ANA SER QL IF: NEGATIVE
ANCA AB PATTERN SER IF-IMP: NORMAL
BERMUDA GRASS IGE QN: 0.21 KU(A)/L
C HERBARUM IGE QN: <0.1 KU(A)/L
C-ANCA TITR SER IF: NORMAL {TITER}
CAT DANDER IGG QN: 0.37 KU(A)/L
CEDAR IGE QN: 0.13 KU(A)/L
COMMON RAGWEED IGE QN: 0.41 KU(A)/L
COTTONWOOD IGE QN: 0.15 KU(A)/L
D FARINAE IGE QN: 3.71 KU(A)/L
D PTERONYSS IGE QN: 2.95 KU(A)/L
DLCOUNC-%PRED-PRE: 118 %
DLCOUNC-PRE: 40.87 ML/MIN/MMHG
DLCOUNC-PRED: 34.44 ML/MIN/MMHG
DOG DANDER+EPITH IGE QN: 0.13 KU(A)/L
ERV-%PRED-PRE: 15 %
ERV-PRE: 0.24 L
ERV-PRED: 1.61 L
EXPTIME-PRE: 10.4 SEC
FEF2575-%PRED-POST: 113 %
FEF2575-%PRED-PRE: 73 %
FEF2575-POST: 4.42 L/SEC
FEF2575-PRE: 2.89 L/SEC
FEF2575-PRED: 3.91 L/SEC
FEFMAX-%PRED-PRE: 78 %
FEFMAX-PRE: 8.73 L/SEC
FEFMAX-PRED: 11.08 L/SEC
FEV1-%PRED-PRE: 73 %
FEV1-PRE: 3.38 L
FEV1FEV6-PRE: 78 %
FEV1FEV6-PRED: 80 %
FEV1FVC-PRE: 77 %
FEV1FVC-PRED: 78 %
FEV1SVC-PRE: 84 %
FEV1SVC-PRED: 74 %
FIFMAX-PRE: 5.26 L/SEC
FRCPLETH-%PRED-PRE: 94 %
FRCPLETH-PRE: 3.7 L
FRCPLETH-PRED: 3.9 L
FVC-%PRED-PRE: 73 %
FVC-PRE: 4.36 L
FVC-PRED: 5.97 L
HCV AB SERPL QL IA: NONREACTIVE
HIV 1+2 AB+HIV1 P24 AG SERPL QL IA: NONREACTIVE
IC-%PRED-PRE: 81 %
IC-PRE: 3.76 L
IC-PRED: 4.6 L
IGE SERPL-ACNC: 37 KU/L (ref 0–114)
IGE SERPL-ACNC: 38 KU/L (ref 0–114)
MAPLE IGE QN: 0.19 KU(A)/L
MARSH ELDER IGE QN: 0.13 KU(A)/L
MOUSE URINE PROT IGE QN: <0.1 KU(A)/L
NETTLE IGE QN: 0.14 KU(A)/L
P NOTATUM IGE QN: <0.1 KU(A)/L
ROACH IGE QN: 0.12 KU(A)/L
RVPLETH-%PRED-PRE: 140 %
RVPLETH-PRE: 3.46 L
RVPLETH-PRED: 2.46 L
SALTWORT IGE QN: 1.32 KU(A)/L
SILVER BIRCH IGE QN: 0.18 KU(A)/L
TIMOTHY IGE QN: 1.28 KU(A)/L
TLCPLETH-%PRED-PRE: 89 %
TLCPLETH-PRE: 7.46 L
TLCPLETH-PRED: 8.34 L
VA-%PRED-PRE: 83 %
VA-PRE: 6.74 L
VC-%PRED-PRE: 64 %
VC-PRE: 4 L
VC-PRED: 6.2 L
WHITE ASH IGE QN: 0.76 KU(A)/L
WHITE ELM IGE QN: 0.14 KU(A)/L
WHITE MULBERRY IGE QN: <0.1 KU(A)/L
WHITE OAK IGE QN: 0.17 KU(A)/L

## 2022-10-13 PROCEDURE — 71250 CT THORAX DX C-: CPT | Mod: GC | Performed by: RADIOLOGY

## 2022-10-14 LAB
ENA JO1 AB SER IA-ACNC: <0.5 U/ML
ENA JO1 IGG SER-ACNC: NEGATIVE

## 2022-10-15 LAB — ALDOLASE SERPL-CCNC: 6.1 U/L

## 2022-10-19 LAB
A FLAVUS AB SER QL ID: NORMAL
A FUMIGATUS1 AB SER QL ID: NORMAL
A FUMIGATUS2 AB SER QL ID: NORMAL
A FUMIGATUS3 AB SER QL ID: NORMAL
A FUMIGATUS6 AB SER QL ID: NORMAL
A PULLULANS AB SER QL ID: NORMAL
PIGEON SERUM AB QL ID: NORMAL
S RECTIVIRGULA AB SER QL ID: NORMAL
S VIRIDIS AB SER QL ID: NORMAL
T CANDIDUS AB SER QL: NORMAL
T VULGARIS1 AB SER QL ID: NORMAL

## 2022-11-03 ENCOUNTER — VIRTUAL VISIT (OUTPATIENT)
Dept: FAMILY MEDICINE | Facility: CLINIC | Age: 52
End: 2022-11-03
Payer: COMMERCIAL

## 2022-11-03 DIAGNOSIS — I10 BENIGN ESSENTIAL HYPERTENSION: ICD-10-CM

## 2022-11-03 DIAGNOSIS — J45.50 SEVERE PERSISTENT ASTHMA WITHOUT COMPLICATION (H): Primary | ICD-10-CM

## 2022-11-03 PROCEDURE — 99214 OFFICE O/P EST MOD 30 MIN: CPT | Mod: GT | Performed by: FAMILY MEDICINE

## 2022-11-03 ASSESSMENT — ASTHMA QUESTIONNAIRES
QUESTION_4 LAST FOUR WEEKS HOW OFTEN HAVE YOU USED YOUR RESCUE INHALER OR NEBULIZER MEDICATION (SUCH AS ALBUTEROL): ONCE A WEEK OR LESS
QUESTION_1 LAST FOUR WEEKS HOW MUCH OF THE TIME DID YOUR ASTHMA KEEP YOU FROM GETTING AS MUCH DONE AT WORK, SCHOOL OR AT HOME: A LITTLE OF THE TIME
QUESTION_5 LAST FOUR WEEKS HOW WOULD YOU RATE YOUR ASTHMA CONTROL: WELL CONTROLLED
QUESTION_3 LAST FOUR WEEKS HOW OFTEN DID YOUR ASTHMA SYMPTOMS (WHEEZING, COUGHING, SHORTNESS OF BREATH, CHEST TIGHTNESS OR PAIN) WAKE YOU UP AT NIGHT OR EARLIER THAN USUAL IN THE MORNING: NOT AT ALL
ACT_TOTALSCORE: 21
ACT_TOTALSCORE: 21
QUESTION_2 LAST FOUR WEEKS HOW OFTEN HAVE YOU HAD SHORTNESS OF BREATH: ONCE OR TWICE A WEEK

## 2022-11-03 NOTE — PROGRESS NOTES
"Fito is a 51 year old who is being evaluated via a billable video visit.             Assessment & Plan     Severe persistent asthma without complication  Much improved on Symbicort.  Has rarely needed to use albuterol.  Has follow-up planned with pulmonology in 3 months.  Noted several allergic triggers on his respiratory panel.  I offered allergist referral.  He declined for now, but may consider in the future.  Denies allergic rhinitis symptoms  Pulmonology notes reviewed today from his visit on 10/12/2022.    Benign essential hypertension  ?  Uncontrolled.  He has not been checking consistently at home.  The times that he has checked have been after some activity.  Recommended checking blood pressure once daily after at least 5 minutes of rest.  He will send me results of these daily readings after couple of weeks.  If additional medication is needed, I would likely start amlodipine.         BMI:   Estimated body mass index is 29.42 kg/m  as calculated from the following:    Height as of 9/19/22: 1.966 m (6' 5.4\").    Weight as of 9/19/22: 113.7 kg (250 lb 11.2 oz).             No follow-ups on file.    Mona Szymanski MD   [facility]    Berkley Payton is a 51 year old  presenting for the following health issues:  No chief complaint on file.      History of Present Illness     Asthma:  He presents for follow up of asthma.  He has no cough, no wheezing, and some shortness of breath. He is using a relief medication a few times a month. He does not miss any doses of his controller medication throughout the week.Patient is aware of the following triggers: same as previous visit, exercise or sports, humidity and occupational exposure. The patient has had a visit to the Emergency Room, Urgent Care or Hospital due to asthma since the last clinic visit. He has been to the Emergency Room or Urgent Care 1 time.He has had a Hospitalization 0 times.    Hypertension: He presents for follow up of " hypertension.  He does not check blood pressure  regularly outside of the clinic. Outside blood pressures have been over 140/90. He follows a low salt diet.       He is feeling much better since he has been using the Symbicort.  Rarely needs to use the albuterol.  Denies any allergic rhinitis symptoms or eczema.  Had allergies when he was a kid.  Allergy shots have been recommended at that time, though he never ended up doing them.       Review of Systems          Objective           Vitals:  No vitals were obtained today due to virtual visit.    Physical Exam   GENERAL: Healthy, alert and no distress  EYES: Eyes grossly normal to inspection.  No discharge or erythema, or obvious scleral/conjunctival abnormalities.  RESP: No audible wheeze, cough, or visible cyanosis.  No visible retractions or increased work of breathing.    SKIN: Visible skin clear. No significant rash, abnormal pigmentation or lesions.  NEURO: Cranial nerves grossly intact.  Mentation and speech appropriate for age.  PSYCH: Mentation appears normal, affect normal/bright, judgement and insight intact, normal speech and appearance well-groomed.    Lab on 10/12/2022   Component Date Value Ref Range Status     Cholesterol 10/12/2022 176  <200 mg/dL Final     Triglycerides 10/12/2022 170 (H)  <150 mg/dL Final     Direct Measure HDL 10/12/2022 57  >=40 mg/dL Final     LDL Cholesterol Calculated 10/12/2022 85  <=100 mg/dL Final     Non HDL Cholesterol 10/12/2022 119  <130 mg/dL Final     Protein Total 10/12/2022 8.2  6.4 - 8.3 g/dL Final     Albumin 10/12/2022 4.4  3.5 - 5.2 g/dL Final     Bilirubin Total 10/12/2022 0.7  <=1.2 mg/dL Final     Alkaline Phosphatase 10/12/2022 113  40 - 129 U/L Final     AST 10/12/2022 49  10 - 50 U/L Final     ALT 10/12/2022 54 (H)  10 - 50 U/L Final     Bilirubin Direct 10/12/2022 <0.20  0.00 - 0.30 mg/dL Final     Immunoglobulin E 10/12/2022 37  0 - 114 kU/L Final     Neutrophil Cytoplasmic Antibody 10/12/2022 <1:10   <1:10 Final     Neutrophil Cytoplasmic Antibody Pa* 10/12/2022 The ANCA IFA is <1:10.  No further testing will be performed.   Final     GLENROY interpretation 10/12/2022 Negative  Negative Final      Negative:              <1:40  Borderline Positive:   1:40 - 1:80  Positive:              >1:80     Erythrocyte Sedimentation Rate 10/12/2022 7  0 - 20 mm/hr Final     CRP Inflammation 10/12/2022 <3.00  <5.00 mg/L Final     Aldolase 10/12/2022 6.1  1.2 - 7.6 U/L Final    REFERENCE INTERVAL: Aldolase    Access complete set of age- and/or gender-specific   reference intervals for this test in the [facility]   Test Directory ([email or web address]).  Performed By: [facility] [employer] [facility]  [address] [place] [address]  : Francisco Toledo MD, PhD     CK 10/12/2022 406 (H)  39 - 308 U/L Final     Whit 1 Bindu IgG Instrument Value 10/12/2022 <0.5  <7 U/mL Final     Whit 1 Antibody IgG 10/12/2022 Negative  Negative Final     Immunoglobulin E 10/12/2022 38  0 - 114 kU/L Final     Alternaria alternata, Mold IgE 10/12/2022 <0.10  <0.10 KU(A)/L Final    Interpretation: None Detected     Aspergillis fumigatus IgE 10/12/2022 <0.10  <0.10 KU(A)/L Final    Interpretation: None Detected     Bermuda Grass IgE 10/12/2022 0.21 (H)  <0.10 KU(A)/L Final    Interpretation: Low     Silver Birch IgE 10/12/2022 0.18 (H)  <0.10 KU(A)/L Final    Interpretation: Low     Cat Dander IgE 10/12/2022 0.37 (H)  <0.10 KU(A)/L Final    Interpretation: Low     Cladosporium herbarum IgE 10/12/2022 <0.10  <0.10 KU(A)/L Final    Interpretation: None Detected     [place] Cockroach IgE 10/12/2022 0.12 (H)  <0.10 KU(A)/L Final    Interpretation: Low     [place] IgE 10/12/2022 0.15 (H)  <0.10 KU(A)/L Final    Interpretation: Low     Dermatophagoides farinae IgE 10/12/2022 3.71 (H)  <0.10 KU(A)/L Final    Interpretation: High     Dermatophagoide pteronyssinus IgE 10/12/2022 2.95 (H)  <0.10 KU(A)/L Final    Interpretation: Moderate     Dog Dander IgE  10/12/2022 0.13 (H)  <0.10 KU(A)/L Final    Interpretation: Low     Elm Tree IgE 10/12/2022 0.14 (H)  <0.10 KU(A)/L Final    Interpretation: Low     Maple Tree IgE 10/12/2022 0.19 (H)  <0.10 KU(A)/L Final    Interpretation: Low     Marshelder IgE 10/12/2022 0.13 (H)  <0.10 KU(A)/L Final    Interpretation: Low     Mouse Urine IgE 10/12/2022 <0.10  <0.10 KU(A)/L Final    Interpretation: None Detected     Mountain [place] IgE 10/12/2022 0.13 (H)  <0.10 KU(A)/L Final    Interpretation: Low     White [place] IgE 10/12/2022 <0.10  <0.10 KU(A)/L Final    Interpretation: None Detected     Weed Nettle IgE 10/12/2022 0.14 (H)  <0.10 KU(A)/L Final    Interpretation: Low     [place] (White) IgE 10/12/2022 0.17 (H)  <0.10 KU(A)/L Final    Interpretation: Low     Penicillium notatum IgE 10/12/2022 <0.10  <0.10 KU(A)/L Final    Interpretation: None Detected     Ragweed Short IgE 10/12/2022 0.41 (H)  <0.10 KU(A)/L Final    Interpretation: Low     Russian Thistle IgE 10/12/2022 1.32 (H)  <0.10 KU(A)/L Final    Interpretation: Moderate     Blair Grass IgE 10/12/2022 1.28 (H)  <0.10 KU(A)/L Final    Interpretation: Moderate     White Aniceto, Tree IgE 10/12/2022 0.76 (H)  <0.10 KU(A)/L Final    Interpretation: Moderate     Aspergillus flavus Ab 10/12/2022 None Detected  None Detected Final     A fumigatus #2 Ab 10/12/2022 None Detected  None Detected Final     A fumigatus #3 Ab 10/12/2022 None Detected  None Detected Final     Saccharo viridis Ab 10/12/2022 None Detected  None Detected Final     Thermo candidus Ab 10/12/2022 None Detected  None Detected Final      Testing includes antibodies directed at Aspergillus flavus,   Aspergillus fumigatus #2, Aspergillus fumigatus #3,   Saccharomonospora viridis, and Thermoactinomyces candidus.  Performed By: [facility]  [address] [place] [id number]  : Francisco Toledo MD, PhD     Aspergillus Fumagatis 1 Antibody 10/12/2022 None Detected  None Detected Final      Aspergillus Fumagatis 6 Antibody 10/12/2022 None Detected  None Detected Final     Aureo Pullulans 10/12/2022 None Detected  None Detected Final     [place] serum 10/12/2022 None Detected  None Detected Final     Micropolyspora Faeni 10/12/2022 None Detected  None Detected Final     Thermoact Vulgaris 1 10/12/2022 See Note  None Detected Final      Testing includes antibodies directed at Aureobasidium   pullulans, Aspergillus fumigatus #1, Aspergillus fumigatus   #6, Micropolyspora faeni, [place] Serum and   Thermoactinomyces vulgaris #1.      Thermoactinomyces vulgaris #1 testing not performed due to   unsatisfactory reagent performance. A credit will be issued   for this component.  Performed By: [facility]  [address] [place] [id number]  : Francisco Toledo MD, PhD     HIV Antigen Antibody Combo 10/12/2022 Nonreactive  Nonreactive Final    HIV-1 p24 Ag & HIV-1/HIV-2 Ab Not Detected     Hepatitis C Antibody 10/12/2022 Nonreactive  Nonreactive Final     WBC Count 10/12/2022 8.2  4.0 - 11.0 10e3/uL Final     RBC Count 10/12/2022 5.37  4.40 - 5.90 10e6/uL Final     Hemoglobin 10/12/2022 15.8  13.3 - 17.7 g/dL Final     Hematocrit 10/12/2022 46.7  40.0 - 53.0 % Final     MCV 10/12/2022 87  78 - 100 fL Final     MCH 10/12/2022 29.4  26.5 - 33.0 pg Final     MCHC 10/12/2022 33.8  31.5 - 36.5 g/dL Final     RDW 10/12/2022 11.8  10.0 - 15.0 % Final     Platelet Count 10/12/2022 294  150 - 450 10e3/uL Final     % Neutrophils 10/12/2022 68  % Final     % Lymphocytes 10/12/2022 22  % Final     % Monocytes 10/12/2022 9  % Final     % Eosinophils 10/12/2022 1  % Final     % Basophils 10/12/2022 0  % Final     % Immature Granulocytes 10/12/2022 0  % Final     NRBCs per 100 WBC 10/12/2022 0  <1 /100 Final     Absolute Neutrophils 10/12/2022 5.5  1.6 - 8.3 10e3/uL Final     Absolute Lymphocytes 10/12/2022 1.8  0.8 - 5.3 10e3/uL Final     Absolute Monocytes 10/12/2022 0.8  0.0 - 1.3  10e3/uL Final     Absolute Eosinophils 10/12/2022 0.1  0.0 - 0.7 10e3/uL Final     Absolute Basophils 10/12/2022 0.0  0.0 - 0.2 10e3/uL Final     Absolute Immature Granulocytes 10/12/2022 0.0  <=0.4 10e3/uL Final     Absolute NRBCs 10/12/2022 0.0  10e3/uL Final        Recent Results (from the past 720 hour(s))   Pulmonary function test    Collection Time: 10/12/22  2:16 PM   Result Value Ref Range    FVC-Pred 5.97 L    FVC-Pre 4.36 L    FVC-%Pred-Pre 73 %    FEV1-Pre 3.38 L    FEV1-%Pred-Pre 73 %    FEV1FVC-Pred 78 %    FEV1FVC-Pre 77 %    FEFMax-Pred 11.08 L/sec    FEFMax-Pre 8.73 L/sec    FEFMax-%Pred-Pre 78 %    FEF2575-Pred 3.91 L/sec    FEF2575-Pre 2.89 L/sec    UDQ9398-%Pred-Pre 73 %    FEF2575-Post 4.42 L/sec    HXH1758-%Pred-Post 113 %    ExpTime-Pre 10.40 sec    FIFMax-Pre 5.26 L/sec    VC-Pred 6.20 L    VC-Pre 4.00 L    VC-%Pred-Pre 64 %    IC-Pred 4.60 L    IC-Pre 3.76 L    IC-%Pred-Pre 81 %    ERV-Pred 1.61 L    ERV-Pre 0.24 L    ERV-%Pred-Pre 15 %    FEV1FEV6-Pred 80 %    FEV1FEV6-Pre 78 %    FRCPleth-Pred 3.90 L    FRCPleth-Pre 3.70 L    FRCPleth-%Pred-Pre 94 %    RVPleth-Pred 2.46 L    RVPleth-Pre 3.46 L    RVPleth-%Pred-Pre 140 %    TLCPleth-Pred 8.34 L    TLCPleth-Pre 7.46 L    TLCPleth-%Pred-Pre 89 %    DLCOunc-Pred 34.44 ml/min/mmHg    DLCOunc-Pre 40.87 ml/min/mmHg    DLCOunc-%Pred-Pre 118 %    VA-Pre 6.74 L    VA-%Pred-Pre 83 %    FEV1SVC-Pred 74 %    FEV1SVC-Pre 84 %   Lipid panel reflex to direct LDL Fasting    Collection Time: 10/12/22  4:59 PM   Result Value Ref Range    Cholesterol 176 <200 mg/dL    Triglycerides 170 (H) <150 mg/dL    Direct Measure HDL 57 >=40 mg/dL    LDL Cholesterol Calculated 85 <=100 mg/dL    Non HDL Cholesterol 119 <130 mg/dL   Hepatic panel    Collection Time: 10/12/22  4:59 PM   Result Value Ref Range    Protein Total 8.2 6.4 - 8.3 g/dL    Albumin 4.4 3.5 - 5.2 g/dL    Bilirubin Total 0.7 <=1.2 mg/dL    Alkaline Phosphatase 113 40 - 129 U/L    AST 49 10 - 50 U/L     ALT 54 (H) 10 - 50 U/L    Bilirubin Direct <0.20 0.00 - 0.30 mg/dL   IgE    Collection Time: 10/12/22  4:59 PM   Result Value Ref Range    Immunoglobulin E 37 0 - 114 kU/L   ANCA IgG by IFA with Reflex to Titer    Collection Time: 10/12/22  4:59 PM   Result Value Ref Range    Neutrophil Cytoplasmic Antibody <1:10 <1:10    Neutrophil Cytoplasmic Antibody Pattern       The ANCA IFA is <1:10.  No further testing will be performed.   Anti Nuclear Bindu IgG by IFA with Reflex    Collection Time: 10/12/22  4:59 PM   Result Value Ref Range    GLENROY interpretation Negative Negative   Erythrocyte sedimentation rate auto    Collection Time: 10/12/22  4:59 PM   Result Value Ref Range    Erythrocyte Sedimentation Rate 7 0 - 20 mm/hr   CRP inflammation    Collection Time: 10/12/22  4:59 PM   Result Value Ref Range    CRP Inflammation <3.00 <5.00 mg/L   Aldolase    Collection Time: 10/12/22  4:59 PM   Result Value Ref Range    Aldolase 6.1 1.2 - 7.6 U/L   CK total    Collection Time: 10/12/22  4:59 PM   Result Value Ref Range     (H) 39 - 308 U/L   Whit 1 Antibody IgG    Collection Time: 10/12/22  4:59 PM   Result Value Ref Range    Whit 1 Bindu IgG Instrument Value <0.5 <7 U/mL    Whit 1 Antibody IgG Negative Negative   [place] Respiratory Allergen Panel    Collection Time: 10/12/22  4:59 PM   Result Value Ref Range    Immunoglobulin E 38 0 - 114 kU/L    Alternaria alternata, Mold IgE <0.10 <0.10 KU(A)/L    Aspergillis fumigatus IgE <0.10 <0.10 KU(A)/L    Berm[place] Grass IgE 0.21 (H) <0.10 KU(A)/L    Silver Birch IgE 0.18 (H) <0.10 KU(A)/L    Cat Dander IgE 0.37 (H) <0.10 KU(A)/L    Cladosporium herbarum IgE <0.10 <0.10 KU(A)/L    [place] Cockroach IgE 0.12 (H) <0.10 KU(A)/L    [place] IgE 0.15 (H) <0.10 KU(A)/L    Dermatophagoides farinae IgE 3.71 (H) <0.10 KU(A)/L    Dermatophagoide pteronyssinus IgE 2.95 (H) <0.10 KU(A)/L    Dog Dander IgE 0.13 (H) <0.10 KU(A)/L    Elm Tree IgE 0.14 (H) <0.10 KU(A)/L    Maple Tree IgE 0.19 (H)  <0.10 KU(A)/L    Marshelder IgE 0.13 (H) <0.10 KU(A)/L    Mouse Urine IgE <0.10 <0.10 KU(A)/L    Mountain [place] IgE 0.13 (H) <0.10 KU(A)/L    White [place] IgE <0.10 <0.10 KU(A)/L    Weed Nettle IgE 0.14 (H) <0.10 KU(A)/L    [place] (White) IgE 0.17 (H) <0.10 KU(A)/L    Penicillium notatum IgE <0.10 <0.10 KU(A)/L    Ragweed Short IgE 0.41 (H) <0.10 KU(A)/L    Russian Thistle IgE 1.32 (H) <0.10 KU(A)/L    Blair Grass IgE 1.28 (H) <0.10 KU(A)/L    White Aniceto, Tree IgE 0.76 (H) <0.10 KU(A)/L   Hypersensitivity Pneumonitis 2    Collection Time: 10/12/22  4:59 PM   Result Value Ref Range    Aspergillus flavus Ab None Detected None Detected    A fumigatus #2 Ab None Detected None Detected    A fumigatus #3 Ab None Detected None Detected    Saccharo viridis Ab None Detected None Detected    Thermo candidus Ab None Detected None Detected   Hypersensitivity pneumonitis    Collection Time: 10/12/22  4:59 PM   Result Value Ref Range    Aspergillus Fumagatis 1 Antibody None Detected None Detected    Aspergillus Fumagatis 6 Antibody None Detected None Detected    Aureo Pullulans None Detected None Detected    [place] serum None Detected None Detected    Micropolyspora Faeni None Detected None Detected    Thermoact Vulgaris 1 See Note None Detected   HIV Antigen Antibody Combo    Collection Time: 10/12/22  4:59 PM   Result Value Ref Range    HIV Antigen Antibody Combo Nonreactive Nonreactive   Hepatitis C Screen Reflex to HCV RNA Quant and Genotype    Collection Time: 10/12/22  4:59 PM   Result Value Ref Range    Hepatitis C Antibody Nonreactive Nonreactive   CBC with platelets and differential    Collection Time: 10/12/22  4:59 PM   Result Value Ref Range    WBC Count 8.2 4.0 - 11.0 10e3/uL    RBC Count 5.37 4.40 - 5.90 10e6/uL    Hemoglobin 15.8 13.3 - 17.7 g/dL    Hematocrit 46.7 40.0 - 53.0 %    MCV 87 78 - 100 fL    MCH 29.4 26.5 - 33.0 pg    MCHC 33.8 31.5 - 36.5 g/dL    RDW 11.8 10.0 - 15.0 %    Platelet Count 294 150 - 450  10e3/uL    % Neutrophils 68 %    % Lymphocytes 22 %    % Monocytes 9 %    % Eosinophils 1 %    % Basophils 0 %    % Immature Granulocytes 0 %    NRBCs per 100 WBC 0 <1 /100    Absolute Neutrophils 5.5 1.6 - 8.3 10e3/uL    Absolute Lymphocytes 1.8 0.8 - 5.3 10e3/uL    Absolute Monocytes 0.8 0.0 - 1.3 10e3/uL    Absolute Eosinophils 0.1 0.0 - 0.7 10e3/uL    Absolute Basophils 0.0 0.0 - 0.2 10e3/uL    Absolute Immature Granulocytes 0.0 <=0.4 10e3/uL    Absolute NRBCs 0.0 10e3/uL           Video-Visit Details    Video Start Time: 11:32 AM    Type of service:  Video Visit    Video End Time:11:52 AM    Originating Location (pt. Location): Home     Distant Location (provider location):  Off-site    Platform used for Video Visit: Juan Alberto

## 2023-01-29 ENCOUNTER — MYC MEDICAL ADVICE (OUTPATIENT)
Dept: FAMILY MEDICINE | Facility: CLINIC | Age: 53
End: 2023-01-29
Payer: COMMERCIAL

## 2023-01-31 NOTE — TELEPHONE ENCOUNTER
See RN response to patient's mychart message re:inhaler /BP    LENNOX RagsdaleN RN  [facility]

## 2023-02-26 ENCOUNTER — MYC MEDICAL ADVICE (OUTPATIENT)
Dept: FAMILY MEDICINE | Facility: CLINIC | Age: 53
End: 2023-02-26
Payer: COMMERCIAL

## 2023-02-27 DIAGNOSIS — J45.50 SEVERE PERSISTENT ASTHMA WITHOUT COMPLICATION (H): ICD-10-CM

## 2023-02-27 NOTE — TELEPHONE ENCOUNTER
Medication Question or Refill    Contacts       Type Contact Phone/Fax    02/27/2023 10:15 AM CST Phone (Incoming) Fito Frye (Self) [phone] (H)          What medication are you calling about (include dose and sig)?: budesonide-formoterol (SYMBICORT) 160-4.5 MCG/ACT Inhaler    Preferred Pharmacy:   [facility][id number] [address] AT [address] [address]  Phone: [phone] Fax: [phone]      Controlled Substance Agreement on file:   CSA -- Patient Level:    CSA: None found at the patient level.       Who prescribed the medication?: Dr. Schaeffer    Do you need a refill? Yes    When did you use the medication last? Daily been out for 2 weeks    Patient offered an appointment? No    Do you have any questions or concerns?  No      Could we send this information to you in [facility] or would you prefer to receive a phone call?:   Patient would prefer a phone call   Okay to leave a detailed message?: Yes at Cell number on file:    Telephone Information:   Mobile [phone]     Ester Ramos   [facility]  Central Scheduler

## 2023-02-27 NOTE — TELEPHONE ENCOUNTER
Writer responded via [employer].    LENNOX BernalN, RN-BC  [facility]

## 2023-02-27 NOTE — TELEPHONE ENCOUNTER
Dr. Szymanski-this medication was last ordered by Pulmonology.  Do you agree to prescribe/manage?      ACT Total Scores 11/3/2022   ACT TOTAL SCORE (Goal Greater than or Equal to 20) 21   In the past 12 months, how many times did you visit the emergency room for your asthma without being admitted to the hospital? 0   In the past 12 months, how many times were you hospitalized overnight because of your asthma? 0     Last Written Prescription Date:  10/12/22  Last Fill Quantity: 1 g,  # refills: 11   Last office visit: 11/3/22 with prescribing provider:  Dr. Szymanski   Future Office Visit:        Thank you!  LENNOX BernalN, RN-[facility]

## 2023-02-28 RX ORDER — BUDESONIDE AND FORMOTEROL FUMARATE DIHYDRATE 160; 4.5 UG/1; UG/1
2 AEROSOL RESPIRATORY (INHALATION) 2 TIMES DAILY
Qty: 30.6 G | Refills: 1 | Status: SHIPPED | OUTPATIENT
Start: 2023-02-28 | End: 2024-05-21

## 2023-04-15 ENCOUNTER — HEALTH MAINTENANCE LETTER (OUTPATIENT)
Age: 53
End: 2023-04-15

## 2023-05-29 ASSESSMENT — ENCOUNTER SYMPTOMS
CONSTIPATION: 0
DIARRHEA: 0
JOINT SWELLING: 0
PARESTHESIAS: 0
ARTHRALGIAS: 1
PALPITATIONS: 0
EYE PAIN: 0
NERVOUS/ANXIOUS: 0
SHORTNESS OF BREATH: 1
HEADACHES: 0
MYALGIAS: 1
HEMATOCHEZIA: 0
DIZZINESS: 1
HEMATURIA: 0
FREQUENCY: 1
NAUSEA: 0
FEVER: 0
COUGH: 1
DYSURIA: 0
CHILLS: 0
WEAKNESS: 1
ABDOMINAL PAIN: 0

## 2023-05-29 ASSESSMENT — ASTHMA QUESTIONNAIRES
QUESTION_5 LAST FOUR WEEKS HOW WOULD YOU RATE YOUR ASTHMA CONTROL: SOMEWHAT CONTROLLED
QUESTION_1 LAST FOUR WEEKS HOW MUCH OF THE TIME DID YOUR ASTHMA KEEP YOU FROM GETTING AS MUCH DONE AT WORK, SCHOOL OR AT HOME: SOME OF THE TIME
QUESTION_4 LAST FOUR WEEKS HOW OFTEN HAVE YOU USED YOUR RESCUE INHALER OR NEBULIZER MEDICATION (SUCH AS ALBUTEROL): TWO OR THREE TIMES PER WEEK
QUESTION_2 LAST FOUR WEEKS HOW OFTEN HAVE YOU HAD SHORTNESS OF BREATH: ONCE A DAY
QUESTION_3 LAST FOUR WEEKS HOW OFTEN DID YOUR ASTHMA SYMPTOMS (WHEEZING, COUGHING, SHORTNESS OF BREATH, CHEST TIGHTNESS OR PAIN) WAKE YOU UP AT NIGHT OR EARLIER THAN USUAL IN THE MORNING: ONCE A WEEK
ACT_TOTALSCORE: 14
ACT_TOTALSCORE: 14

## 2023-05-30 ENCOUNTER — OFFICE VISIT (OUTPATIENT)
Dept: FAMILY MEDICINE | Facility: CLINIC | Age: 53
End: 2023-05-30
Payer: COMMERCIAL

## 2023-05-30 ENCOUNTER — LAB (OUTPATIENT)
Dept: FAMILY MEDICINE | Facility: CLINIC | Age: 53
End: 2023-05-30

## 2023-05-30 VITALS
DIASTOLIC BLOOD PRESSURE: 86 MMHG | BODY MASS INDEX: 30.07 KG/M2 | OXYGEN SATURATION: 98 % | HEIGHT: 77 IN | HEART RATE: 81 BPM | RESPIRATION RATE: 16 BRPM | TEMPERATURE: 97.2 F | SYSTOLIC BLOOD PRESSURE: 136 MMHG | WEIGHT: 254.7 LBS

## 2023-05-30 DIAGNOSIS — G44.209 TENSION HEADACHE: ICD-10-CM

## 2023-05-30 DIAGNOSIS — Z12.11 SCREEN FOR COLON CANCER: ICD-10-CM

## 2023-05-30 DIAGNOSIS — Z00.00 ROUTINE GENERAL MEDICAL EXAMINATION AT A HEALTH CARE FACILITY: Primary | ICD-10-CM

## 2023-05-30 DIAGNOSIS — I10 BENIGN ESSENTIAL HYPERTENSION: ICD-10-CM

## 2023-05-30 DIAGNOSIS — J45.50 SEVERE PERSISTENT ASTHMA WITHOUT COMPLICATION (H): ICD-10-CM

## 2023-05-30 DIAGNOSIS — J30.81 CAT ALLERGIES: ICD-10-CM

## 2023-05-30 DIAGNOSIS — R06.02 SHORTNESS OF BREATH: ICD-10-CM

## 2023-05-30 DIAGNOSIS — I25.10 CORONARY ARTERY DISEASE INVOLVING NATIVE CORONARY ARTERY OF NATIVE HEART WITHOUT ANGINA PECTORIS: ICD-10-CM

## 2023-05-30 DIAGNOSIS — Z12.11 SCREENING FOR COLON CANCER: ICD-10-CM

## 2023-05-30 DIAGNOSIS — E78.5 HYPERLIPIDEMIA LDL GOAL <70: ICD-10-CM

## 2023-05-30 LAB
ALBUMIN SERPL BCG-MCNC: 4.4 G/DL (ref 3.5–5.2)
ALP SERPL-CCNC: 90 U/L (ref 40–129)
ALT SERPL W P-5'-P-CCNC: 32 U/L (ref 10–50)
ANION GAP SERPL CALCULATED.3IONS-SCNC: 12 MMOL/L (ref 7–15)
AST SERPL W P-5'-P-CCNC: 33 U/L (ref 10–50)
BILIRUB SERPL-MCNC: 0.5 MG/DL
BUN SERPL-MCNC: 21.2 MG/DL (ref 6–20)
CALCIUM SERPL-MCNC: 9.1 MG/DL (ref 8.6–10)
CHLORIDE SERPL-SCNC: 103 MMOL/L (ref 98–107)
CHOLEST SERPL-MCNC: 147 MG/DL
CREAT SERPL-MCNC: 0.88 MG/DL (ref 0.67–1.17)
CREAT UR-MCNC: 166 MG/DL
DEPRECATED HCO3 PLAS-SCNC: 25 MMOL/L (ref 22–29)
GFR SERPL CREATININE-BSD FRML MDRD: >90 ML/MIN/1.73M2
GLUCOSE SERPL-MCNC: 90 MG/DL (ref 70–99)
HDLC SERPL-MCNC: 54 MG/DL
LDLC SERPL CALC-MCNC: 71 MG/DL
MICROALBUMIN UR-MCNC: <12 MG/L
MICROALBUMIN/CREAT UR: NORMAL MG/G{CREAT}
NONHDLC SERPL-MCNC: 93 MG/DL
POTASSIUM SERPL-SCNC: 4 MMOL/L (ref 3.4–5.3)
PROT SERPL-MCNC: 6.3 G/DL (ref 6.4–8.3)
SODIUM SERPL-SCNC: 140 MMOL/L (ref 136–145)
TRIGL SERPL-MCNC: 108 MG/DL

## 2023-05-30 PROCEDURE — 80053 COMPREHEN METABOLIC PANEL: CPT | Performed by: FAMILY MEDICINE

## 2023-05-30 PROCEDURE — 90471 IMMUNIZATION ADMIN: CPT | Performed by: FAMILY MEDICINE

## 2023-05-30 PROCEDURE — 90677 PCV20 VACCINE IM: CPT | Performed by: FAMILY MEDICINE

## 2023-05-30 PROCEDURE — 36415 COLL VENOUS BLD VENIPUNCTURE: CPT | Performed by: FAMILY MEDICINE

## 2023-05-30 PROCEDURE — 82043 UR ALBUMIN QUANTITATIVE: CPT | Performed by: FAMILY MEDICINE

## 2023-05-30 PROCEDURE — 82570 ASSAY OF URINE CREATININE: CPT | Performed by: FAMILY MEDICINE

## 2023-05-30 PROCEDURE — 99396 PREV VISIT EST AGE 40-64: CPT | Mod: 25 | Performed by: FAMILY MEDICINE

## 2023-05-30 PROCEDURE — 80061 LIPID PANEL: CPT | Performed by: FAMILY MEDICINE

## 2023-05-30 PROCEDURE — 99214 OFFICE O/P EST MOD 30 MIN: CPT | Mod: 25 | Performed by: FAMILY MEDICINE

## 2023-05-30 RX ORDER — NORTRIPTYLINE HCL 10 MG
10 CAPSULE ORAL AT BEDTIME
Qty: 91 CAPSULE | Refills: 3 | Status: SHIPPED | OUTPATIENT
Start: 2023-05-30 | End: 2024-05-21

## 2023-05-30 RX ORDER — HYDROCHLOROTHIAZIDE 25 MG/1
25 TABLET ORAL DAILY
Qty: 90 TABLET | Refills: 3 | Status: SHIPPED | OUTPATIENT
Start: 2023-05-30 | End: 2024-05-21

## 2023-05-30 RX ORDER — ATORVASTATIN CALCIUM 40 MG/1
40 TABLET, FILM COATED ORAL DAILY
Qty: 90 TABLET | Refills: 3 | Status: SHIPPED | OUTPATIENT
Start: 2023-05-30 | End: 2024-05-21

## 2023-05-30 RX ORDER — BUDESONIDE AND FORMOTEROL FUMARATE DIHYDRATE 160; 4.5 UG/1; UG/1
2 AEROSOL RESPIRATORY (INHALATION) 2 TIMES DAILY
Qty: 30.6 G | Refills: 1 | Status: CANCELLED | OUTPATIENT
Start: 2023-05-30

## 2023-05-30 RX ORDER — BUDESONIDE AND FORMOTEROL FUMARATE DIHYDRATE 160; 4.5 UG/1; UG/1
AEROSOL RESPIRATORY (INHALATION)
Qty: 20.4 G | Refills: 11 | Status: SHIPPED | OUTPATIENT
Start: 2023-05-30 | End: 2024-06-20

## 2023-05-30 RX ORDER — HYDROCHLOROTHIAZIDE 12.5 MG/1
12.5 TABLET ORAL DAILY
Qty: 90 TABLET | Refills: 3 | Status: CANCELLED | OUTPATIENT
Start: 2023-05-30

## 2023-05-30 RX ORDER — ALBUTEROL SULFATE 90 UG/1
1-2 AEROSOL, METERED RESPIRATORY (INHALATION) EVERY 6 HOURS PRN
Qty: 18 G | Refills: 1 | Status: SHIPPED | OUTPATIENT
Start: 2023-05-30 | End: 2023-12-07

## 2023-05-30 RX ORDER — MONTELUKAST SODIUM 10 MG/1
10 TABLET ORAL AT BEDTIME
Qty: 90 TABLET | Refills: 3 | Status: SHIPPED | OUTPATIENT
Start: 2023-05-30 | End: 2024-05-21

## 2023-05-30 ASSESSMENT — ENCOUNTER SYMPTOMS
HEMATURIA: 0
PARESTHESIAS: 0
HEADACHES: 0
DYSURIA: 0
CHILLS: 0
HEMATOCHEZIA: 0
DIZZINESS: 1
NERVOUS/ANXIOUS: 0
PALPITATIONS: 0
FREQUENCY: 1
ARTHRALGIAS: 1
COUGH: 1
SHORTNESS OF BREATH: 1
CONSTIPATION: 0
FEVER: 0
WEAKNESS: 1
DIARRHEA: 0
NAUSEA: 0
EYE PAIN: 0
ABDOMINAL PAIN: 0
JOINT SWELLING: 0
MYALGIAS: 1

## 2023-05-30 ASSESSMENT — PAIN SCALES - GENERAL: PAINLEVEL: NO PAIN (1)

## 2023-05-30 NOTE — PATIENT INSTRUCTIONS
Check blood pressure daily at home for the next 2-3 weeks. Put the BP cuff by your pills so you remember  See me via video in two weeks.   Schedule with the allergist. Consider possible allergy shots for cat allergy if needed.   Schedule follow up with pulmonology.   Start singulair 10mg daily for your allergies and asthma.   Increase the budesonide-formoterol inhaler to 2 puffs twice daily (scheduled) and you can use an additional 8 puffs during the day as needed (like you would use the albuterol inhaler)   I recommend getting the shingles vaccine at a local pharmacy             Preventive Health Recommendations  Male Ages 50 - 64    Yearly exam:             See your health care provider every year in order to  o   Review health changes.   o   Discuss preventive care.    o   Review your medicines if your doctor has prescribed any.   Have a cholesterol test every 5 years, or more frequently if you are at risk for high cholesterol/heart disease.   Have a diabetes test (fasting glucose) every three years. If you are at risk for diabetes, you should have this test more often.   Have a colonoscopy at age 50, or have a yearly FIT test (stool test). These exams will check for colon cancer.    Talk with your health care provider about whether or not a prostate cancer screening test (PSA) is right for you.  You should be tested each year for STDs (sexually transmitted diseases), if you re at risk.     Shots: Get a flu shot each year. Get a tetanus shot every 10 years.     Nutrition:  Eat at least 5 servings of fruits and vegetables daily.   Eat whole-grain bread, whole-wheat pasta and brown rice instead of white grains and rice.   Get adequate Calcium and Vitamin D.     Lifestyle  Exercise for at least 150 minutes a week (30 minutes a day, 5 days a week). This will help you control your weight and prevent disease.   Limit alcohol to one drink per day.   No smoking.   Wear sunscreen to prevent skin cancer.   See your dentist  every six months for an exam and cleaning.   See your eye doctor every 1 to 2 years.

## 2023-05-30 NOTE — PROGRESS NOTES
"SUBJECTIVE:   CC: Fito is an 52 year old who presents for preventative health visit.       5/30/2023     7:22 AM   Additional Questions   Roomed by Yancy Llanes   Accompanied by Self   Patient has been advised of split billing requirements and indicates understanding: Yes  Healthy Habits:     Getting at least 3 servings of Calcium per day:  Yes    Bi-annual eye exam:  Yes    Dental care twice a year:  NO    Sleep apnea or symptoms of sleep apnea:  None    Diet:  Regular (no restrictions)    Frequency of exercise:  4-5 days/week    Duration of exercise:  30-45 minutes    Taking medications regularly:  Yes    PHQ-2 Total Score: 0    Experiencing asthma/allergies. Feels like his \"okay\" but not great. Middle of the road at this point with his shortness of breath and fatigue. Had a bad night last night. Cat will sit on him all of the time. Denies any nasal symptoms, but noticed that he gets short of breath around the cat. Takes allegra. Uses his inhaler. Difficult time sleeping. The cat is in the room with him. The cat searches him out.     When he is having chest tightness, he wonders if he is \"losing O2\".     Was out in the garden all day yesterday. Felt like his muscles were really sore while he is having an attack.     Has not checked his bp the past couple of weeks.     Never has congestion, runny nose, sneezing, itchy eyes. Just asthma symptoms triggered by allergies. Does seem like he is coughing up some mucous.     Has a sensation in his neck just anterior to the SCM deep in his neck. Has not felt a lump in the area. Feels deep. Does not notice a trigger. sometimes does not notice it. It is intermittent. Seems random. Occurring for a few weeks. Had some dental stuff done, a root canal. Jaw had been sore. Thought maybe related to root canal. It started before the root canal.            Today's PHQ-2 Score:       5/29/2023     4:04 PM   PHQ-2 ( 1999 [employer])   Q1: Little interest or pleasure in doing things 0   Q2: " Feeling down, depressed or hopeless 0   PHQ-2 Score 0   Q1: Little interest or pleasure in doing things Not at all   Q2: Feeling down, depressed or hopeless Not at all   PHQ-2 Score 0       Have you ever done Advance Care Planning? (For example, a Health Directive, POLST, or a discussion with a medical provider or your loved ones about your wishes): No, advance care planning information given to patient to review.  Patient plans to discuss their wishes with loved ones or provider.      Social History     Tobacco Use     Smoking status: Never     Smokeless tobacco: Never   Vaping Use     Vaping status: Never Used   Substance Use Topics     Alcohol use: Yes     Comment: usually a couple beers after work daily              5/29/2023     4:03 PM   Alcohol Use   Prescreen: >3 drinks/day or >7 drinks/week? No       Last PSA: No results found for: PSA    Reviewed orders with patient. Reviewed health maintenance and updated orders accordingly - Yes       Reviewed and updated as needed this visit by clinical staff   Tobacco  Allergies  Meds              Reviewed and updated as needed this visit by Provider                      Review of Systems   Constitutional: Negative for chills and fever.   HENT: Positive for congestion and hearing loss. Negative for ear pain.    Eyes: Negative for pain and visual disturbance.   Respiratory: Positive for cough and shortness of breath.    Cardiovascular: Positive for chest pain. Negative for palpitations and peripheral edema.   Gastrointestinal: Negative for abdominal pain, constipation, diarrhea, hematochezia and nausea.   Genitourinary: Positive for frequency. Negative for dysuria, genital sores, hematuria, impotence, penile discharge and urgency.   Musculoskeletal: Positive for arthralgias and myalgias. Negative for joint swelling.   Skin: Negative for rash.   Neurological: Positive for dizziness and weakness. Negative for headaches and paresthesias.   Psychiatric/Behavioral:  "Negative for mood changes. The patient is not nervous/anxious.         OBJECTIVE:   /86 (BP Location: Right arm, Patient Position: Sitting, Cuff Size: Adult Large)   Pulse 81   Temp 97.2  F (36.2  C) (Tympanic)   Resp 16   Ht 1.966 m (6' 5.4\")   Wt 115.5 kg (254 lb 11.2 oz)   SpO2 98%   BMI 29.89 kg/m      Physical Exam  GENERAL: healthy, alert and no distress  EYES: Eyes grossly normal to inspection, PERRL and conjunctivae and sclerae normal  HENT: ear canals and TM's normal, nose and mouth without ulcers or lesions  NECK: no adenopathy, no asymmetry, masses, or scars and thyroid normal to palpation  RESP: lungs clear to auscultation - no rales, rhonchi or wheezes  CV: regular rate and rhythm, normal S1 S2, no S3 or S4, no murmur, click or rub, no peripheral edema and peripheral pulses strong  ABDOMEN: soft, nontender, no hepatosplenomegaly, no masses and bowel sounds normal  MS: no gross musculoskeletal defects noted, no edema  SKIN: no suspicious lesions or rashes  NEURO: Normal strength and tone, mentation intact and speech normal  PSYCH: mentation appears normal, affect normal/bright    Diagnostic Test Results:  Labs reviewed in Epic    ASSESSMENT/PLAN:       ICD-10-CM    1. Routine general medical examination at a health care facility  Z00.00       2. Coronary artery disease involving native coronary artery of native heart without angina pectoris  I25.10       3. Hyperlipidemia LDL goal <70  E78.5       4. Severe persistent asthma without complication  J45.50       5. Benign essential hypertension  I10       6. Tension headache  G44.209       7. Shortness of breath  R06.02       8. Screen for colon cancer  Z12.11         Routine preventive visit  Healthy   Colon cancer screening recommended - he prefers cologuard   Shingrix vaccine recommended  PCV 20 recommended due to asthma    Severe persistent asthma without complication  Uncontrolled. It turns out, he never used the symbicort as instructed, " only used 1 puff twice daily. Will increase to 2 puffs twice daily and as needed for a total of up to 12 puffs per day by new IRON guidelines. Refills sent today. Starting singulair 10mg daily as well. Recommended follow up with pulmonology.     Much improved on Symbicort.  Has rarely needed to use albuterol.    Was recommended to follow-up with pulmonology in January, but he has not scheduled.  Noted several allergic triggers on his respiratory panel.  I offered allergist referral.  He declined for now, but may consider in the future.  Denies allergic rhinitis symptoms  Last Pulmonology visit was on 10/12/2022.     Benign essential hypertension  Improved.He has not been checking consistently at home since increasing hydrochlorothiazide dose.  Continues hydrochlorothiazide 25 mg daily. Will check bp daily for two weeks and follow up with me virtually   CMP and urine albumin today    Mild nonobstructive coronary artery disease - CAC score of 83  Per cardiology note from 9/19/22, they recommended starting ASA for secondary prevention of CAD    Hyperlipidemia goal <70   continues atorvastatin 40mg daily. Check lipids and CMP today     Tension Headache  Continues nortriptyline 10mg daily which works well for him. Refills given today        Patient Instructions   1. Check blood pressure daily at home for the next 2-3 weeks. Put the BP cuff by your pills so you remember  2. See me via video in two weeks.   3. Schedule with the allergist. Consider possible allergy shots for cat allergy if needed.   4. Schedule follow up with pulmonology.   5. Start singulair 10mg daily for your allergies and asthma.   6. Increase the budesonide-formoterol inhaler to 2 puffs twice daily (scheduled) and you can use an additional 8 puffs during the day as needed (like you would use the albuterol inhaler)   7. I recommend getting the shingles vaccine at a local pharmacy             Preventive Health Recommendations  Male Ages 50 - 64    Yearly  "exam:             See your health care provider every year in order to  o   Review health changes.   o   Discuss preventive care.    o   Review your medicines if your doctor has prescribed any.     Have a cholesterol test every 5 years, or more frequently if you are at risk for high cholesterol/heart disease.     Have a diabetes test (fasting glucose) every three years. If you are at risk for diabetes, you should have this test more often.     Have a colonoscopy at age 50, or have a yearly FIT test (stool test). These exams will check for colon cancer.      Talk with your health care provider about whether or not a prostate cancer screening test (PSA) is right for you.    You should be tested each year for STDs (sexually transmitted diseases), if you re at risk.     Shots: Get a flu shot each year. Get a tetanus shot every 10 years.     Nutrition:    Eat at least 5 servings of fruits and vegetables daily.     Eat whole-grain bread, whole-wheat pasta and brown rice instead of white grains and rice.     Get adequate Calcium and Vitamin D.     Lifestyle    Exercise for at least 150 minutes a week (30 minutes a day, 5 days a week). This will help you control your weight and prevent disease.     Limit alcohol to one drink per day.     No smoking.     Wear sunscreen to prevent skin cancer.     See your dentist every six months for an exam and cleaning.     See your eye doctor every 1 to 2 years.         COUNSELING:   Reviewed preventive health counseling, as reflected in patient instructions      BMI:   Estimated body mass index is 29.89 kg/m  as calculated from the following:    Height as of this encounter: 1.966 m (6' 5.4\").    Weight as of this encounter: 115.5 kg (254 lb 11.2 oz).          He reports that he has never smoked. He has never used smokeless tobacco.         Mona Szymanski MD   [facility]  "

## 2023-05-30 NOTE — NURSING NOTE
Prior to immunization administration, verified patients identity using patient s name and date of birth. Please see Immunization Activity for additional information.     Screening Questionnaire for Adult Immunization    Are you sick today?   No   Do you have allergies to medications, food, a vaccine component or latex?   No   Have you ever had a serious reaction after receiving a vaccination?   No   Do you have a long-term health problem with heart, lung, kidney, or metabolic disease (e.g., diabetes), asthma, a blood disorder, no spleen, complement component deficiency, a cochlear implant, or a spinal fluid leak?  Are you on long-term aspirin therapy?   Yes   Do you have cancer, leukemia, HIV/AIDS, or any other immune system problem?   No   Do you have a parent, brother, or sister with an immune system problem?   No   In the past 3 months, have you taken medications that affect  your immune system, such as prednisone, other steroids, or anticancer drugs; drugs for the treatment of rheumatoid arthritis, Crohn s disease, or psoriasis; or have you had radiation treatments?   No   Have you had a seizure, or a brain or other nervous system problem?   No   During the past year, have you received a transfusion of blood or blood    products, or been given immune (gamma) globulin or antiviral drug?   No   For women: Are you pregnant or is there a chance you could become       pregnant during the next month?   No   Have you received any vaccinations in the past 4 weeks?   No     Immunization questionnaire was positive for at least one answer.  Notified house.      Injection of prevnar 20 given by Alaina Keys MA. Patient instructed to remain in clinic for 15 minutes afterwards, and to report any adverse reactions.     Screening performed by Alaina Keys MA on 5/30/2023 at 8:35 AM.

## 2023-06-15 ENCOUNTER — VIRTUAL VISIT (OUTPATIENT)
Dept: FAMILY MEDICINE | Facility: CLINIC | Age: 53
End: 2023-06-15
Payer: COMMERCIAL

## 2023-06-15 DIAGNOSIS — J45.50 SEVERE PERSISTENT ASTHMA WITHOUT COMPLICATION (H): Primary | ICD-10-CM

## 2023-06-15 DIAGNOSIS — I10 BENIGN ESSENTIAL HYPERTENSION: ICD-10-CM

## 2023-06-15 PROCEDURE — 99214 OFFICE O/P EST MOD 30 MIN: CPT | Mod: VID | Performed by: FAMILY MEDICINE

## 2023-06-15 RX ORDER — AMLODIPINE BESYLATE 2.5 MG/1
2.5 TABLET ORAL DAILY
Qty: 90 TABLET | Refills: 0 | Status: SHIPPED | OUTPATIENT
Start: 2023-06-15 | End: 2023-07-12

## 2023-06-15 NOTE — PROGRESS NOTES
Fito is a 52 year old who is being evaluated via a billable video visit.      How would you like to obtain your AVS? [employer]  If the video visit is dropped, the invitation should be resent by: Text to cell phone: [phone]  Will anyone else be joining your video visit? No          Assessment & Plan        Severe persistent asthma without complication  Improving. Continue symbicort 2 puffs twice daily and as needed for up to total of 12 puffs per day.     Uncontrolled. It turns out, he never used the symbicort as instructed, only used 1 puff twice daily. Will increase to 2 puffs twice daily and as needed for a total of up to 12 puffs per day by new IRON guidelines. Refills sent today. Starting singulair 10mg daily as well. Recommended follow up with pulmonology.      Much improved on Symbicort.  Has rarely needed to use albuterol.    Was recommended to follow-up with pulmonology in January, but he has not scheduled.  Noted several allergic triggers on his respiratory panel.  I offered allergist referral.  He declined for now, but may consider in the future.  Denies allergic rhinitis symptoms  Last Pulmonology visit was on 10/12/2022.     Benign essential hypertension  Improving.  Home bps have been 130s/80s.  Continue hydrochlorothiazide 25 mg daily and start amlodipine 2.5 g daily.  He will send a [employer] message with 2 weeks of daily pressures in 2 weeks.  Discussed potential risk/side effect/benefits. He will let me know if he is having any side effects or concerns. Recommended hydrating well and discussed potential higher risk of heat sensitivity/dehydration with BP medications.     He has not been checking consistently at home since increasing hydrochlorothiazide dose.  Continues hydrochlorothiazide 25 mg daily. Will check bp daily for two weeks and follow up with me virtually   CMP and urine albumin today     Mild nonobstructive coronary artery disease - CAC score of 83  Per cardiology note from 9/19/22,  "they recommended  ASA 81mg daily for secondary prevention of CAD     Hyperlipidemia goal <70   continues atorvastatin 40mg daily. Lipids look good. LDL 71 is reasonable.      Tension Headache  Continues nortriptyline 10mg daily which works well for him.      Patient Instructions   1. Start amlodipine 2.5mg daily  2. Follow up via mychart with two weeks of daily blood pressure readings in two weeks  3. Hydrate well.   4. Let me know if you need a letter for work indicating need for air conditioned cab.   5. Schedule video follow up with on a Thursday in 6 months.      He will follow up with me via video in 6 months.                 BMI:   Estimated body mass index is 29.89 kg/m  as calculated from the following:    Height as of 5/30/23: 1.966 m (6' 5.4\").    Weight as of 5/30/23: 115.5 kg (254 lb 11.2 oz).         Mona Szymanski MD   [facility]    Berkley Payton is a 52 year old, presenting for the following health issues:  RECHECK (Follow Up )        6/15/2023     7:24 AM   Additional Questions   Roomed by Ana Wilhelm     History of Present Illness     Asthma:  He presents for follow up of asthma.  He has some cough, no wheezing, and some shortness of breath. He is using a relief medication daily. He does not miss any doses of his controller medication throughout the week.Patient is aware of the following triggers: occupational exposure, pollens and smoke. The patient has not had a visit to the Emergency Room, Urgent Care or Hospital due to asthma since the last clinic visit.     Hypertension: He presents for follow up of hypertension.  He does check blood pressure  regularly outside of the clinic. Outside blood pressures have been over 140/90. He does not follow a low salt diet.     He eats 2-3 servings of fruits and vegetables daily.He consumes 0 sweetened beverage(s) daily.He exercises with enough effort to increase his heart rate 9 or less minutes per day.  He exercises with " enough effort to increase his heart rate 3 or less days per week.   He is taking medications regularly.       Has seen some improvement in cough and chest tightness with inhaler adjustment.     Tolerating bp med well. BP has been in the 130s/80s mostly.     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 Last year cab temp got over 100 degrees.     Review of Systems          Objective           Vitals:  No vitals were obtained today due to virtual visit.    Physical Exam   GENERAL: Healthy, alert and no distress  EYES: Eyes grossly normal to inspection.  No discharge or erythema, or obvious scleral/conjunctival abnormalities.  RESP: No audible wheeze, cough, or visible cyanosis.  No visible retractions or increased work of breathing.    SKIN: Visible skin clear. No significant rash, abnormal pigmentation or lesions.  NEURO: Cranial nerves grossly intact.  Mentation and speech appropriate for age.  PSYCH: Mentation appears normal, affect normal/bright, judgement and insight intact, normal speech and appearance well-groomed.    Office Visit on 05/30/2023   Component Date Value Ref Range Status     Cholesterol 05/30/2023 147  <200 mg/dL Final     Triglycerides 05/30/2023 108  <150 mg/dL Final     Direct Measure HDL 05/30/2023 54  >=40 mg/dL Final     LDL Cholesterol Calculated 05/30/2023 71  <=100 mg/dL Final     Non HDL Cholesterol 05/30/2023 93  <130 mg/dL Final     Sodium 05/30/2023 140  136 - 145 mmol/L Final     Potassium 05/30/2023 4.0  3.4 - 5.3 mmol/L Final     Chloride 05/30/2023 103  98 - 107 mmol/L Final     Carbon Dioxide (CO2) 05/30/2023 25  22 - 29 mmol/L Final     Anion Gap 05/30/2023 12  7 - 15 mmol/L Final     Urea Nitrogen 05/30/2023 21.2 (H)  6.0 - 20.0 mg/dL Final     Creatinine 05/30/2023 0.88  0.67 - 1.17 mg/dL Final     Calcium 05/30/2023 9.1  8.6 - 10.0 mg/dL Final     Glucose 05/30/2023 90  70 - 99 mg/dL Final      Alkaline Phosphatase 05/30/2023 90  40 - 129 U/L Final     AST 05/30/2023 33  10 - 50 U/L Final     ALT 05/30/2023 32  10 - 50 U/L Final     Protein Total 05/30/2023 6.3 (L)  6.4 - 8.3 g/dL Final     Albumin 05/30/2023 4.4  3.5 - 5.2 g/dL Final     Bilirubin Total 05/30/2023 0.5  <=1.2 mg/dL Final     GFR Estimate 05/30/2023 >90  >60 mL/min/1.73m2 Final    eGFR calculated using 2021 CKD-EPI equation.     Creatinine Urine mg/dL 05/30/2023 166.0  mg/dL Final    The reference ranges have not been established in urine creatinine. The results should be integrated into the clinical context for interpretation.     Albumin Urine mg/L 05/30/2023 <12.0  mg/L Final    The reference ranges have not been established in urine albumin. The results should be integrated into the clinical context for interpretation.     Albumin Urine mg/g Cr 05/30/2023    Final    Unable to calculate, urine albumin and/or urine creatinine is outside detectable limits.  Microalbuminuria is defined as an albumin:creatinine ratio of 17 to 299 for males and 25 to 299 for females. A ratio of albumin:creatinine of 300 or higher is indicative of overt proteinuria.  Due to biologic variability, positive results should be confirmed by a second, first-morning random or 24-hour timed urine specimen. If there is discrepancy, a third specimen is recommended. When 2 out of 3 results are in the microalbuminuria range, this is evidence for incipient nephropathy and warrants increased efforts at glucose control, blood pressure control, and institution of therapy with an angiotensin-converting-enzyme (ACE) inhibitor (if the patient can tolerate it).                  Video-Visit Details    Type of service:  Video Visit     Originating Location (pt. Location): Home    Distant Location (provider location):  Off-site  Platform used for Video Visit: [employer]

## 2023-06-15 NOTE — PATIENT INSTRUCTIONS
Start amlodipine 2.5mg daily  Follow up via mychart with two weeks of daily blood pressure readings in two weeks  Hydrate well.   Let me know if you need a letter for work indicating need for air conditioned cab.   Schedule video follow up with on a Thursday in 6 months.

## 2023-06-16 LAB — NONINV COLON CA DNA+OCC BLD SCRN STL QL: NEGATIVE

## 2023-06-19 NOTE — RESULT ENCOUNTER NOTE
Excellent! Please call or send a [employer] message if you have any questions. Mona Szymanski M.D.

## 2023-07-07 ENCOUNTER — MYC MEDICAL ADVICE (OUTPATIENT)
Dept: FAMILY MEDICINE | Facility: CLINIC | Age: 53
End: 2023-07-07
Payer: COMMERCIAL

## 2023-07-07 DIAGNOSIS — I10 BENIGN ESSENTIAL HYPERTENSION: ICD-10-CM

## 2023-07-12 RX ORDER — AMLODIPINE BESYLATE 2.5 MG/1
5 TABLET ORAL DAILY
Qty: 90 TABLET | Refills: 0 | COMMUNITY
Start: 2023-07-12 | End: 2023-08-31

## 2023-07-12 NOTE — TELEPHONE ENCOUNTER
Dr. Szymanski: most recent reading entered into chart    You wanted me to follow up with blood pressure readings for two weeks. There s a couple days that I missed but here s the numbers.  132/86  151/98  158/91  133/83  135/84  134/82  122/80  132/81  137/90  138/77  129/83  136/85    Sahara GARCIA RN  [facility]

## 2023-08-28 DIAGNOSIS — I10 BENIGN ESSENTIAL HYPERTENSION: ICD-10-CM

## 2023-08-31 ENCOUNTER — ALLIED HEALTH/NURSE VISIT (OUTPATIENT)
Dept: FAMILY MEDICINE | Facility: CLINIC | Age: 53
End: 2023-08-31
Payer: COMMERCIAL

## 2023-08-31 VITALS — SYSTOLIC BLOOD PRESSURE: 122 MMHG | DIASTOLIC BLOOD PRESSURE: 84 MMHG | HEART RATE: 71 BPM

## 2023-08-31 DIAGNOSIS — I10 BENIGN ESSENTIAL HYPERTENSION: Primary | ICD-10-CM

## 2023-08-31 PROCEDURE — 99207 PR NO CHARGE NURSE ONLY: CPT

## 2023-08-31 RX ORDER — AMLODIPINE BESYLATE 2.5 MG/1
2.5 TABLET ORAL DAILY
Qty: 90 TABLET | Refills: 0 | OUTPATIENT
Start: 2023-08-31

## 2023-08-31 RX ORDER — AMLODIPINE BESYLATE 2.5 MG/1
5 TABLET ORAL DAILY
Qty: 180 TABLET | Refills: 0 | Status: SHIPPED | OUTPATIENT
Start: 2023-08-31 | End: 2023-09-20

## 2023-08-31 NOTE — TELEPHONE ENCOUNTER
Routing refill request to provider for review/approval because:  --7/7/23 encounter -  Hi Fito, Thanks for sending your blood pressure readings.  Many years of your readings are still elevated.  I recommend increasing the amlodipine to 5 mg daily.  Please send me another [employer] message in 2 to 3 weeks with your blood pressure readings again.  Let me know in the meantime if you have any questions.  Thanks,      Mona Szymanski M.D.     --I prefer provider authorize this time.      ,  --Please contact patient and ask patient to send blood pressure readings to Dr. Szymanski..    --This request has been routed to provider to authorize.         --Last visit:  6/15/2023     --Future Visit: 12/7/23 House.        --Last Written Prescription:          BP Readings from Last 6 Encounters:   05/30/23 136/86   10/12/22 (!) 190/104   09/19/22 (!) 146/89   09/15/22 124/71   08/17/22 127/88   07/01/22 127/86

## 2023-08-31 NOTE — PROGRESS NOTES
"Patient reports \"Muscle cramps\" in his legs and his hand \"gets stuck\". This has been happening more frequently with the recent hot weather. Does work outside. Has been getting dizzy and headaches occasionally as well.     On medication review he his on hydrochlorothiazide although this is not a new prescription for him. Also on amlodipine but not likely to cause these symptoms. Huddled with TOMAS Montoya who agreed not likely the medications and ok to monitor with home care measures.     Informed patient of provider response and encouraged them to get adequate fluid intake. Maybe consider sports drinks for electrolytes when working in the heat. Patient verbalized agreement and will call or send a [employer] message if symptoms get worse or change.     Eladia Marie RN  [facility]        "

## 2023-08-31 NOTE — TELEPHONE ENCOUNTER
LVM to have pt call back to schedule MA only BP check appointment. Inform pt that medication was only filled for one time, as pt needs to schedule this appointment.

## 2023-09-19 ENCOUNTER — NURSE TRIAGE (OUTPATIENT)
Dept: FAMILY MEDICINE | Facility: CLINIC | Age: 53
End: 2023-09-19
Payer: COMMERCIAL

## 2023-09-19 ENCOUNTER — MYC MEDICAL ADVICE (OUTPATIENT)
Dept: FAMILY MEDICINE | Facility: CLINIC | Age: 53
End: 2023-09-19
Payer: COMMERCIAL

## 2023-09-19 NOTE — TELEPHONE ENCOUNTER
Patient returned call to triage regarding prior asthma exacerbation concerns and BP concerns.    Patient scheduled for office visit tomorrow 9/20 at 1310 - patient using close to max doses of rescue inhaler and has had some BP drops as of late. Pt concerned d/t driving for work and is wanting better asthma management w/o ongoing BP concerns.    Reason for Disposition   MILD asthma attack (e.g., no SOB at rest, mild SOB with walking, speaks normally in sentences, mild wheezing) and lasting > 24 hours on prescribed treatment    Additional Information   Negative: SEVERE asthma attack (e.g., struggling for each breath, speaks in single words, loud wheezes, pulse >120) (RED Zone)   Negative: Bluish (or gray) lips or face   Negative: Difficult to awaken or acting confused (e.g., disoriented, slurred speech)   Negative: Passed out (i.e., fainted, collapsed and was not responding)   Negative: Wheezing started suddenly after medicine, an allergic food, or bee sting   Negative: Sounds like a life-threatening emergency to the triager   Negative: MODERATE asthma attack (e.g., SOB at rest, speaks in phrases, audible wheezes) AND doesn't have neb or inhaler available   Negative: Peak flow rate less than 50% of baseline level (RED Zone)   Negative: Oxygen level (e.g., pulse oximetry) 90% or lower   Negative: Hospitalized before with asthma; now feels same   Negative: MODERATE asthma attack (e.g., SOB at rest, speaks in phrases, audible wheezes) and not resolved after 2 or 3 inhaler or nebulizer treatments given 20 minutes apart   Negative: Peak flow rate 50-79% of baseline level (YELLOW zone) after using 2 or 3 inhaler nebulizer treatments given 20 minutes) apart   Negative: Chest pain  (Exception: Mild chest tightness that feels the same as prior asthma attacks.)   Negative: Patient sounds very sick or weak to the triager   Negative: Continuous (nonstop) coughing that keeps patient from working or sleeping, and not improved after  "2 or 3 inhaler or nebulizer treatments given 20 minutes apart   Negative: Fever > 103 F (39.4 C)   Negative: Fever > 101 F (38.3 C) and over 60 years of age   Negative: Fever present > 3 days (72 hours)   Negative: Coughing up tasia-colored or yellow-green sputum   Negative: Patient wants to be seen   Negative: Quick-relief asthma medicine (e.g., albuterol /salbutamol, levalbuterol by inhaler or nebulizer) is needed more frequently than every 4 hours to keep you comfortable   Negative: Oxygen level (e.g., pulse oximetry) 91 to 94%   Negative: COVID-19 diagnosed or suspected (e.g., COVID-19 present in community, known COVID-19 exposure, positive test) AND has COVID symptoms (e.g., cough, fever)   Negative: Influenza diagnosed or suspected AND has FLU symptoms (e.g., cough with fever)    Answer Assessment - Initial Assessment Questions  1. RESPIRATORY STATUS: \"Describe your breathing?\" (e.g., wheezing, shortness of breath, unable to speak, severe coughing)       Dizziness, chest tightness, mildly SOB, denies wheezing  2. ONSET: \"When did this asthma attack begin?\"       Off and on several weeks, about 1-2 days consistently  3. TRIGGER: \"What do you think triggered this attack?\" (e.g., URI, exposure to pollen or other allergen, tobacco smoke)       Previously, warm weather was exacerbating but this has improved  4. PEAK EXPIRATORY FLOW RATE (PEFR): \"Do you use a peak flow meter?\" If Yes, ask: \"What's the current peak flow? What's your personal best peak flow?\"       N/a  5. SEVERITY: \"How bad is this attack?\"     - MILD: No SOB at rest, mild SOB with walking, speaks normally in sentences, can lie down, no retractions, pulse < 100. (GREEN Zone: PEFR %)    - MODERATE: SOB at rest, SOB with minimal exertion and prefers to sit, cannot lie down flat, speaks in phrases, mild retractions, audible wheezing, pulse 100-120. (YELLOW Zone: PEFR 50-79%)     - SEVERE: Struggling for each breath, speaks in single words, " "struggling to breathe, sitting hunched forward, retractions, usually loud wheezing, sometimes minimal wheezing because of decreased air movement, pulse > 120. (RED Zone: PEFR < 50%).       Mild to moderate  6. ASTHMA MEDICINES:  \"What treatments have you tried?\"     - INHALED QUICK RELIEF (RESCUE): \"What is your inhaled quick-relief medicine?\" (e.g., albuterol, salbutamol) \"Do you use an inhaler or a nebulizer?\" \"How frequently have you been using this medicine?\"    - CONTROLLER (LONG-TERM-CONTROL): \"Do you take an inhaled steroid? (e.g., Asmanex, Flovent, Pulmicort, Qvar)      Prescribed inhalers - max dose of albuterol rescue inhaler  7. INHALED QUICK-RELIEF TREATMENTS FOR THIS ATTACK: \"What treatments have you given yourself so far?\" and \"How many and how often?\" If using an inhaler, ask, \"How many puffs?\" Note: Routine treatments are 2 puffs every 4 hours as needed. Rescue treatments are 4 puffs repeated every 20 minutes, up to three times as needed.       2 puffs every 6hrs  8. OTHER SYMPTOMS: \"Do you have any other symptoms? (e.g., chest pain, coughing up yellow sputum, fever, runny nose)      Denies x- increased urinary frequency, but pt feels this is likely d/t increased fluid intake  9. O2 SATURATION MONITOR:  \"Do you use an oxygen saturation monitor (pulse oximeter) at home?\" If Yes, \"What is your reading (oxygen level) today?\" \"What is your usual oxygen saturation reading?\" (e.g., 95%)      Yes, 95-96% but sometimes drops  10. PREGNANCY: \"Is there any chance you are pregnant?\" \"When was your last menstrual period?\"        N/a    Protocols used: Asthma Attack-A-OH    LENNOX NickersonN, RN  [facility]    "

## 2023-09-19 NOTE — TELEPHONE ENCOUNTER
"Per 9/19/23 Rain message:    \"I left work today early feeling ill. I have been taking all my medications. I had chest tightness and dizziness and fatigue. I took 6 doses of Budesonide inhaler and 2 doses of my Albuterol inhaler during the morning but was not feeling better. I bought a O2 finger monitor which put me at 95 or 96 % and then took my blood pressure 3 times 91/146 88/148  94/144 . Here is my question is my asthma, causing my blood pressure to go on making me lightheaded?\"         Left message to call back and ask to speak with an available triage nurse.  LENNOX BernalN, RN-BC  [facility]    "

## 2023-09-19 NOTE — TELEPHONE ENCOUNTER
See 9/19/23 nurse triage encounter.    Writer responded via [employer].    LENNOX BernalN, RN-BC  [facility]

## 2023-09-20 ENCOUNTER — ANCILLARY PROCEDURE (OUTPATIENT)
Dept: GENERAL RADIOLOGY | Facility: CLINIC | Age: 53
End: 2023-09-20
Attending: NURSE PRACTITIONER
Payer: COMMERCIAL

## 2023-09-20 ENCOUNTER — OFFICE VISIT (OUTPATIENT)
Dept: FAMILY MEDICINE | Facility: CLINIC | Age: 53
End: 2023-09-20
Payer: COMMERCIAL

## 2023-09-20 VITALS
TEMPERATURE: 97.2 F | BODY MASS INDEX: 29.16 KG/M2 | HEIGHT: 77 IN | HEART RATE: 82 BPM | WEIGHT: 247 LBS | DIASTOLIC BLOOD PRESSURE: 97 MMHG | OXYGEN SATURATION: 97 % | RESPIRATION RATE: 16 BRPM | SYSTOLIC BLOOD PRESSURE: 131 MMHG

## 2023-09-20 DIAGNOSIS — J45.50 SEVERE PERSISTENT ASTHMA WITHOUT COMPLICATION (H): Primary | ICD-10-CM

## 2023-09-20 DIAGNOSIS — K21.00 GASTROESOPHAGEAL REFLUX DISEASE WITH ESOPHAGITIS WITHOUT HEMORRHAGE: ICD-10-CM

## 2023-09-20 DIAGNOSIS — I25.10 NONOBSTRUCTIVE ATHEROSCLEROSIS OF CORONARY ARTERY: ICD-10-CM

## 2023-09-20 DIAGNOSIS — I10 BENIGN ESSENTIAL HYPERTENSION: ICD-10-CM

## 2023-09-20 DIAGNOSIS — J45.50 SEVERE PERSISTENT ASTHMA WITHOUT COMPLICATION (H): ICD-10-CM

## 2023-09-20 LAB
ERYTHROCYTE [DISTWIDTH] IN BLOOD BY AUTOMATED COUNT: 11.9 % (ref 10–15)
HCT VFR BLD AUTO: 46.7 % (ref 40–53)
HGB BLD-MCNC: 15.5 G/DL (ref 13.3–17.7)
MCH RBC QN AUTO: 29.5 PG (ref 26.5–33)
MCHC RBC AUTO-ENTMCNC: 33.2 G/DL (ref 31.5–36.5)
MCV RBC AUTO: 89 FL (ref 78–100)
PLATELET # BLD AUTO: 297 10E3/UL (ref 150–450)
RBC # BLD AUTO: 5.26 10E6/UL (ref 4.4–5.9)
WBC # BLD AUTO: 6.5 10E3/UL (ref 4–11)

## 2023-09-20 PROCEDURE — 90686 IIV4 VACC NO PRSV 0.5 ML IM: CPT | Performed by: NURSE PRACTITIONER

## 2023-09-20 PROCEDURE — 99215 OFFICE O/P EST HI 40 MIN: CPT | Mod: 25 | Performed by: NURSE PRACTITIONER

## 2023-09-20 PROCEDURE — 90471 IMMUNIZATION ADMIN: CPT | Performed by: NURSE PRACTITIONER

## 2023-09-20 PROCEDURE — 71046 X-RAY EXAM CHEST 2 VIEWS: CPT | Mod: TC | Performed by: STUDENT IN AN ORGANIZED HEALTH CARE EDUCATION/TRAINING PROGRAM

## 2023-09-20 PROCEDURE — 36415 COLL VENOUS BLD VENIPUNCTURE: CPT | Performed by: NURSE PRACTITIONER

## 2023-09-20 PROCEDURE — 85027 COMPLETE CBC AUTOMATED: CPT | Performed by: NURSE PRACTITIONER

## 2023-09-20 RX ORDER — AMLODIPINE BESYLATE 5 MG/1
5 TABLET ORAL DAILY
Qty: 90 TABLET | Refills: 1 | Status: SHIPPED | OUTPATIENT
Start: 2023-09-20 | End: 2024-05-21

## 2023-09-20 ASSESSMENT — ASTHMA QUESTIONNAIRES: ACT_TOTALSCORE: 11

## 2023-09-20 ASSESSMENT — PAIN SCALES - GENERAL: PAINLEVEL: NO PAIN (0)

## 2023-09-20 NOTE — PROGRESS NOTES
Assessment & Plan     Severe persistent asthma without complication  See full details below; plan transcribed by SEMAJ Epress  - XR Chest 2 Views  - CBC with platelets  - CBC with platelets    Gastroesophageal reflux disease with esophagitis without hemorrhage  See full details below; plan transcribed by SEMAJ Epress  - omeprazole (PRILOSEC) 20 MG DR capsule  Dispense: 112 capsule; Refill: 0    Benign essential hypertension  See full details below; plan transcribed by SEMAJ Epress  - amLODIPine (NORVASC) 5 MG tablet  Dispense: 90 tablet; Refill: 1     Nonobstructive atherosclerosis of coronary artery   -See full details below; plan transcribed by SEMAJ Epress      1. Asthma exacerbation.  He was advised to use albuterol 2 puffs prn when he feels short of breath; wheezing;  He was advised not to use Symbicort as rescue for now to avoid over use; side effect can be  increase blood pressure; LLL w/rales-cxr wnl and wbc wnl       2. Hypertension.  His blood pressure today is 131/97. We will start taking amlodipine 5 mg (2 tablets) as recommended by PCP.  He was advised to take 2 tablets of amlodipine 5 mg until he gets the new prescription which is 5 mg tablet.  He was advised to take his blood pressure medication in the morning for the next 2 weeks and then check his blood pressure again 2 hours after taking his medication. He was advised to send his blood pressure readings to Dr. Szymanski in [facility].    3. Dizziness.  R/t multiple doses of Symbicort and/or elevated blood pressure; last echo 7/2022 EF 55-60% no edema; pnd, orthopnea low suspicion cardiac in nature; will monitor     4. Nonobstructive atherosclerosis of coronary artery   coronary CTA September 2022 demonstrating mild non obstructive CAD with total Agatston score of 83, primarily located in the LAD; seen Cards started statin       5. Chest pain/Acid reflux.  His chest tightness highly likely r/t GERD vs asthma;  He has some tenderness in the epigastric region and  radiates to the left upper quadrant. He was advised to coffee, spicy foods, citrusy foods, tomato, . We will prescribe omeprazole twice a day for 6 weeks. Follow up with PCP via wilda.    ROSSI Gonzalez CNP [facility]    50 minutes of the appointment were spent evaluating and developing a treatment plan, reviewing records .        Berkley Payton is a 52 year old, presenting for the following health issues:  Asthma      9/20/2023     1:15 PM   Additional Questions   Roomed by Carmela Wan   Accompanied by self       History of Present Illness     Asthma:  He presents for follow up of asthma.  He has some cough, no wheezing, and some shortness of breath.  He is using a relief medication 2-3 times per day. He does not miss any doses of his controller medication throughout the week. Patient is aware of the following triggers: same as previous visit, animal dander, humidity and occupational exposure. The patient has not had a visit to the Emergency Room, Urgent Care or Hospital due to asthma since the last clinic visit.     Hypertension: He presents for follow up of hypertension.  He does check blood pressure  regularly outside of the clinic. Outside blood pressures have been over 140/90. He does not follow a low salt diet.     He eats 2-3 servings of fruits and vegetables daily.He consumes 0 sweetened beverage(s) daily.He exercises with enough effort to increase his heart rate 9 or less minutes per day.  He exercises with enough effort to increase his heart rate 4 days per week. He is missing 1 dose(s) of medications per week.  He is not taking prescribed medications regularly due to remembering to take.     The patient is a 52-year-old male in clinic for followup of asthma exacerbation.    He left work early yesterday because he was not feeling well. He was having some chest tightness, dizziness,shortness of breath. and fatigue. He took 6 puffs of budesonide in the morning and 2 puffs  "in the evening. He tried to switch it up, but he waited a little while and kept working. [facility], but he started to get dizzy. He tried one more time with budesonide, but he finished half his route. He never noticed an immediate difference with the albuterol. He does not tend to notice that he is wheezing a lot. He does not have a coughing attack, but he feels like he is coughing to clear his throat during the day.   He was diagnosed with asthma a year ago.     He has chest tightness every day, but he can live with it. He gets fatigued sometimes. Last fall, he had an asthma test and was very dizzy. His blood pressure was 190 a few minutes later. He wonders if shortness of breath is leading his blood pressure to spike, which is making him dizzy.     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 He woke up yesterday with dizziness and lightheadedness. He thought it would go away, but the medicine has not kicked in yet. His chest tightness was a little more pronounced when he woke up yesterday. He gets dizzy when he bends down to grab things. He wonders if all the doses of budesonide were making him more dizzy. He had a heart check last fall and was told he had a \"40 percent blockage\"  He takes atorvastatin at night.    In the last 4 to 5 months, he often has a lot of white phlegm. He coughs it up frequently. He constantly feels like there is something in there. When he woke up yesterday, he felt a little short of breath and lightheaded. It is always located in the upper chest/epigastric region. He feels it every day. He does not notice it more after breakfast. He feels a little sluggish right after he eats.   He gets bloated after eating.     He is constantly clearing his throat and phlegm during the day. He has a burning sensation in his lungs. He denies nausea. He denies a sour taste in his mouth when he wakes up.     He checked his blood pressure when he got home and it was " "high. He knows his blood pressure is too high. He takes amlodipine 2.5 mg once a day and hydrochlorothiazide in the morning. He takes aspirin in the morning. He tries to eat a healthy diet. He eats oatmeal from breakfast and coffee. He drinks 20 oz of coffee a day.         Supplemental Information  He keeps getting cuts on his arms. He wonders if it is from the statin.                Review of Systems         Objective    BP (!) 145/96 (BP Location: Right arm, Patient Position: Sitting, Cuff Size: Adult Regular)   Pulse 87   Temp 97.2  F (36.2  C) (Temporal)   Resp 16   Ht 1.956 m (6' 5\")   Wt 112 kg (247 lb)   SpO2 98%   BMI 29.29 kg/m    Body mass index is 29.29 kg/m .  Physical Exam   GENERAL: healthy, alert and no distress  RESP: rhonchi L lower posterior  CV: regular rate and rhythm, normal S1 S2, no S3 or S4, no murmur, click or rub, no peripheral edema and peripheral pulses strong  ABDOMEN: tenderness epigastric and LUQ and bowel sounds normal    Results for orders placed or performed in visit on 09/20/23 (from the past 24 hour(s))   CBC with platelets   Result Value Ref Range    WBC Count 6.5 4.0 - 11.0 10e3/uL    RBC Count 5.26 4.40 - 5.90 10e6/uL    Hemoglobin 15.5 13.3 - 17.7 g/dL    Hematocrit 46.7 40.0 - 53.0 %    MCV 89 78 - 100 fL    MCH 29.5 26.5 - 33.0 pg    MCHC 33.2 31.5 - 36.5 g/dL    RDW 11.9 10.0 - 15.0 %    Platelet Count 297 150 - 450 10e3/uL                     "

## 2023-09-20 NOTE — PATIENT INSTRUCTIONS
Blood pressure:  - take AMLODIPINE 5 MG (2 TABLETS) daily;   - sent new prescription for 5 mg tablet to pharmacy  For your blood pressure:  Check your blood pressure once a day in the evening   you should be sitting restfully for ~10 minutes before you take it.  Note your blood pressure on a paper log or on your phone  In ~2 weeks, send PCP a message on [employer] with your results.  Your goal is <140/90, even better is <130/80.  Low sodium, high potassium (DASH) diet.    Chest tightness (GERD)  - start Omeprazole 20 mg two times a day morning and bedtime  For 6 weeks  - jaxisit w/PCP on effectiveness  - reduce coffee intake (decaf/caf0    Asthma:  - continue current inhalers  - only Albuterol inhaler for rescue breathing; shortness of breath wheezing;       Dizziness:  - monitor symptoms and follow up if no improvement

## 2023-09-20 NOTE — TELEPHONE ENCOUNTER
This refill request is a duplicate request, previously received or sent.   Sent denial notification to pharmacy.  REYES Pierson  [facility]

## 2023-09-21 NOTE — RESULT ENCOUNTER NOTE
Results discussed directly with patient while patient was present. Any further details documented in the note.   ROSSI Gonzalez CNP

## 2023-09-23 NOTE — TELEPHONE ENCOUNTER
FUTURE VISIT INFORMATION      FUTURE VISIT INFORMATION:  Date: 12.20.23  Time: 7:30  [facility]  REFERRAL INFORMATION:  Referring provider:  House  Referring providers clinic:  [facility]  Reason for visit/diagnosis  Cat allergies [J30.81]/ referred by Dr Mona Szymanski/ recs in Epic/ appt sched per pt      RECORDS REQUESTED FROM:       Clinic name Comments Records Status Imaging Status   [facility] 5.30.23  House [facility]

## 2023-10-15 ENCOUNTER — MYC MEDICAL ADVICE (OUTPATIENT)
Dept: FAMILY MEDICINE | Facility: CLINIC | Age: 53
End: 2023-10-15
Payer: COMMERCIAL

## 2023-10-16 ENCOUNTER — NURSE TRIAGE (OUTPATIENT)
Dept: FAMILY MEDICINE | Facility: CLINIC | Age: 53
End: 2023-10-16
Payer: COMMERCIAL

## 2023-10-16 NOTE — TELEPHONE ENCOUNTER
Reason for Disposition    HIGH RISK patient (e.g., weak immune system, age > 64 years, obesity with BMI of 30 or higher, pregnant, chronic lung disease or other chronic medical condition) and COVID symptoms (e.g., cough, fever)  (Exceptions: Already seen by doctor or NP/PA and no new or worsening symptoms.)    Additional Information    Negative: SEVERE difficulty breathing (e.g., struggling for each breath, speaks in single words)    Negative: Difficult to awaken or acting confused (e.g., disoriented, slurred speech)    Negative: Bluish (or gray) lips or face now    Negative: Shock suspected (e.g., cold/pale/clammy skin, too weak to stand, low BP, rapid pulse)    Negative: Sounds like a life-threatening emergency to the triager    Negative: Diagnosed or suspected COVID-19 and symptoms lasting 3 or more weeks    Negative: COVID-19 exposure and no symptoms    Negative: COVID-19 vaccine reaction suspected (e.g., fever, headache, muscle aches) occurring 1 to 3 days after getting vaccine    Negative: COVID-19 vaccine, questions about    Negative: Lives with someone known to have influenza (flu test positive) and flu-like symptoms (e.g., cough, runny nose, sore throat, SOB; with or without fever)    Negative: Possible COVID-19 symptoms and triager concerned about severity of symptoms or other causes    Negative: COVID-19 and breastfeeding, questions about    Negative: SEVERE or constant chest pain or pressure  (Exception: Mild central chest pain, present only when coughing.)    Negative: MODERATE difficulty breathing (e.g., speaks in phrases, SOB even at rest, pulse 100-120)    Negative: Headache and stiff neck (can't touch chin to chest)    Negative: Oxygen level (e.g., pulse oximetry) 90% or lower    Negative: Chest pain or pressure  (Exception: MILD central chest pain, present only when coughing.)    Negative: Drinking very little and dehydration suspected (e.g., no urine > 12 hours, very dry mouth, very  "lightheaded)    Negative: Patient sounds very sick or weak to the triager    Negative: MILD difficulty breathing (e.g., minimal/no SOB at rest, SOB with walking, pulse <100)    Negative: Fever > 103 F (39.4 C)    Negative: Fever > 101 F (38.3 C) and over 60 years of age    Negative: Fever > 100.0 F (37.8 C) and bedridden (e.g., CVA, chronic illness, recovering from surgery)    Answer Assessment - Initial Assessment Questions  1. COVID-19 DIAGNOSIS: \"How do you know that you have COVID?\" (e.g., positive lab test or self-test, diagnosed by doctor or NP/PA, symptoms after exposure).      At home covid test    2. COVID-19 EXPOSURE: \"Was there any known exposure to COVID before the symptoms began?\" [facility] Definition of close contact: within 6 feet (2 meters) for a total of 15 minutes or more over a 24-hour period.       No    3. ONSET: \"When did the COVID-19 symptoms start?\"       10/13/23    4. WORST SYMPTOM: \"What is your worst symptom?\" (e.g., cough, fever, shortness of breath, muscle aches)      Painful cough    5. COUGH: \"Do you have a cough?\" If Yes, ask: \"How bad is the cough?\"        Yes, not constant but hurts to cough    6. FEVER: \"Do you have a fever?\" If Yes, ask: \"What is your temperature, how was it measured, and when did it start?\"      No fever    7. RESPIRATORY STATUS: \"Describe your breathing?\" (e.g., normal; shortness of breath, wheezing, unable to speak)       Normal    8. BETTER-SAME-WORSE: \"Are you getting better, staying the same or getting worse compared to yesterday?\"  If getting worse, ask, \"In what way?\"      Worse    9. OTHER SYMPTOMS: \"Do you have any other symptoms?\"  (e.g., chills, fatigue, headache, loss of smell or taste, muscle pain, sore throat)      Chills, fatigue, headache, sore throat, and muscle pain    10. HIGH RISK DISEASE: \"Do you have any chronic medical problems?\" (e.g., asthma, heart or lung disease, weak immune system, obesity, etc.)        Asthma    11. VACCINE: \"Have you had " "the COVID-19 vaccine?\" If Yes, ask: \"Which one, how many shots, when did you get it?\"        Yes    12. PREGNANCY: \"Is there any chance you are pregnant?\" \"When was your last menstrual period?\"        No    13. O2 SATURATION MONITOR:  \"Do you use an oxygen saturation monitor (pulse oximeter) at home?\" If Yes, ask \"What is your reading (oxygen level) today?\" \"What is your usual oxygen saturation reading?\" (e.g., 95%)        955 - 96%    Protocols used: Coronavirus (COVID-19) Diagnosed or [id number]    "

## 2023-10-16 NOTE — TELEPHONE ENCOUNTER
See 10/16/23 nurse triage encounter.    Writer responded via [employer].    LENNOX BernalN, RN-BC  [facility]

## 2023-10-16 NOTE — TELEPHONE ENCOUNTER
"Per 10/15/23 [employer] encounter:  \"Dr. Szymanski I was feeling sick on Friday. I took a  Covid test today and I tested positive. Do I need to do anything different because of my asthma. I m feeling pretty cruddy but I do have that oxygen finger sensor and I was still at 95 so I think I m OK. \"    Left message to call back and ask to speak with an available triage nurse, or check [employer] messages.    LINCOLN Bernal, RN-BC  [facility]    "

## 2023-10-16 NOTE — TELEPHONE ENCOUNTER
RN COVID TREATMENT VISIT  10/16/23      The patient has been triaged and does not require a higher level of care.    Fito Frye  52 year old  Current weight? 245    Has the patient been seen by a primary care provider at an [facility] or [facility] within the past two years? Yes.   Have you been in close proximity to/do you have a known exposure to a person with a confirmed case of influenza? No.     General treatment eligibility:  Date of positive COVID test (PCR or at home)?  10/15/23    Are you or have you been hospitalized for this COVID-19 infection? No.   Have you received monoclonal antibodies or antiviral treatment for COVID-19 since this positive test? No.   Do you have any of the following conditions that place you at risk of being very sick from COVID-19?   - Age 50 years or older  - Chronic lung diseases such as asthma, bronchiectasis, COPD, interstitial lung disease, pulmonary embolism, pulmonary hypertension   Yes, patient has at least one high risk condition as noted above.     Current COVID symptoms:   - fever or chills  - cough  - fatigue  - muscle or body aches  - headache  - sore throat  - congestion or runny nose  Yes. Patient has at least one symptom as selected.     How many days since symptoms started? 5 days or less. Established patient, 12 years or older weighing at least 88.2 lbs, who has symptoms that started in the past 5 days, has not been hospitalized nor received treatment already, and is at risk for being very sick from COVID-19.     Treatment eligibility by RN:  Are you currently pregnant or nursing? No  Do you have a clinically significant hypersensitivity to nirmatrelvir or ritonavir, or toxic epidermal necrolysis (TEN) or Auguste-Vivek Syndrome? No  Do you have a history of hepatitis, any hepatic impairment on the Problem List (such as Child-Nance Class C, cirrhosis, fatty liver disease, alcoholic liver disease), or was the last liver lab  (hepatic panel, ALT, AST, ALK Phos, bilirubin) elevated in the past 6 months? No  Do you have any history of severe renal impairment (eGFR < 30mL/min)? No    Is patient eligible to continue? Yes, patient meets all eligibility requirements for the RN COVID treatment (as denoted by all no responses above).     Current Outpatient Medications   Medication Sig Dispense Refill    Acetaminophen (TYLENOL PO)       albuterol (PROAIR HFA/PROVENTIL HFA/VENTOLIN HFA) 108 (90 Base) MCG/ACT inhaler Inhale 1-2 puffs into the lungs every 6 hours as needed for shortness of breath or wheezing 18 g 1    amLODIPine (NORVASC) 5 MG tablet Take 1 tablet (5 mg) by mouth daily 90 tablet 1    aspirin (ASA) 81 MG EC tablet Take 1 tablet (81 mg) by mouth daily 90 tablet 3    atorvastatin (LIPITOR) 40 MG tablet Take 1 tablet (40 mg) by mouth daily 90 tablet 3    budesonide-formoterol (SYMBICORT) 160-4.5 MCG/ACT Inhaler Inhale 2 puffs twice daily plus 1-2 puffs as needed. May use up to 12 puffs per day. 20.4 g 11    budesonide-formoterol (SYMBICORT) 160-4.5 MCG/ACT Inhaler Inhale 2 puffs into the lungs 2 times daily 30.6 g 1    hydrochlorothiazide (HYDRODIURIL) 25 MG tablet Take 1 tablet (25 mg) by mouth daily 90 tablet 3    montelukast (SINGULAIR) 10 MG tablet Take 1 tablet (10 mg) by mouth At Bedtime 90 tablet 3    nortriptyline (PAMELOR) 10 MG capsule Take 1 capsule (10 mg) by mouth At Bedtime 91 capsule 3    omeprazole (PRILOSEC) 20 MG DR capsule Take 1 capsule (20 mg) by mouth 2 times daily for 56 days 112 capsule 0    VITAMIN D OR 1 tablet daily         Medications from List 1 of the standing order (on medications that exclude the use of Paxlovid) that patient is taking: NONE. Is patient taking Charlie's Wort? No  Is patient taking Atwood's Wort or any meds from List 1? No.   Medications from List 2 of the standing order (on meds that provider needs to adjust) that patient is taking: amlodipine (Norvasc), explained a provider visit is  necessary to discuss medication adjustments while taking Paxlovid. Is patient on any of the meds from List 2? Yes. Patient will be scheduled or transferred to a  at the end of this call. PT SCHEDULED 10/17/23.    Jf Stovall RN

## 2023-10-17 ENCOUNTER — VIRTUAL VISIT (OUTPATIENT)
Dept: FAMILY MEDICINE | Facility: CLINIC | Age: 53
End: 2023-10-17

## 2023-10-17 DIAGNOSIS — U07.1 INFECTION DUE TO 2019 NOVEL CORONAVIRUS: Primary | ICD-10-CM

## 2023-10-17 DIAGNOSIS — E78.5 HYPERLIPIDEMIA LDL GOAL <70: ICD-10-CM

## 2023-10-17 DIAGNOSIS — I10 BENIGN ESSENTIAL HYPERTENSION: ICD-10-CM

## 2023-10-17 DIAGNOSIS — J45.50 SEVERE PERSISTENT ASTHMA WITHOUT COMPLICATION (H): ICD-10-CM

## 2023-10-17 PROCEDURE — 99442 PR PHYSICIAN TELEPHONE EVALUATION 11-20 MIN: CPT | Mod: 95 | Performed by: FAMILY MEDICINE

## 2023-10-17 NOTE — PROGRESS NOTES
"Fito is a 52 year old who is being evaluated via a billable telephone visit.      What phone number would you like to be contacted at? [phone]   How would you like to obtain your AVS? Danny    Distant Location (provider location):  On-site      Assessment & Plan     Infection due to 2019 novel coronavirus     At high risk of severe COVID-19 illness due to medical conditions. He has been vaccinated for COVID-19.     Discussed that Paxlovid can cause an impaired sense of taste, diarrhea, high blood pressure, and muscle aches. PAXLOVID can increase risk of liver inflammation and jaundice. Pt will let us know if concerning symptoms arise.      Will hold atorvastatin for 8 days beginning when Paxlovid is started.   Keep amlodipine dose the same.     Hyperlipidemia  Hold atorvastatin as noted above due to paxlovid interaction    HTN  Continue current medications. Reviewed potential interaction with amlodipine and if he is feeling lightheaded or having hypotension can reduce amlodipine dose to 1/2 tablet daily     Severe persistent asthma  Continue current inhalers. He will let us know if asthma symptoms are worsening.            BMI:   Estimated body mass index is 29.29 kg/m  as calculated from the following:    Height as of 9/20/23: 1.956 m (6' 5\").    Weight as of 9/20/23: 112 kg (247 lb).         Mona Szymanski MD  [facility]    Subjective   Fito is a 52 year old, presenting for the following health issues:  Covid Concern (Covid Treatment /)    History of Present Illness       Reason for visit:  Covid  Symptom onset:  1-3 days ago  Symptoms include:  Sore throat cough headache fatigue muscle pain congestion  Symptom intensity:  Moderate  Symptom progression:  Worsening  Had these symptoms before:  No  What makes it worse:  Each day symptoms are changing  What makes it better:  Acetaminophen hot tea    He eats 2-3 servings of fruits and vegetables daily.He consumes 0 sweetened " "beverage(s) daily.He exercises with enough effort to increase his heart rate 10 to 19 minutes per day.  He exercises with enough effort to increase his heart rate 3 or less days per week. He is missing 1 dose(s) of medications per week.  He is not taking prescribed medications regularly due to remembering to take.            Review of Systems        Objective    Vitals - Patient Reported  Systolic (Patient Reported): 130  Diastolic (Patient Reported): 88  Weight (Patient Reported): 111.1 kg (245 lb)  Height (Patient Reported): 193 cm (6' 4\")  BMI (Based on Pt Reported Ht/Wt): 29.82  SpO2 (Patient Reported): 97  Pulse (Patient Reported): 65  Pain Score: Moderate Pain (5)  Pain Loc: Chest (sore throat)      Vitals:  No vitals were obtained today due to virtual visit.    Physical Exam   healthy, alert, and no distress  PSYCH: Alert and oriented times 3; coherent speech, normal   rate and volume, able to articulate logical thoughts, able   to abstract reason, no tangential thoughts, no hallucinations   or delusions  His affect is normal  RESP: No cough, no audible wheezing, able to talk in full sentences  Remainder of exam unable to be completed due to telephone visits                Phone call duration: 13 minutes      "

## 2023-10-17 NOTE — PATIENT INSTRUCTIONS
For informational purposes only. Not to replace the advice of your health care provider. Copyright   2022 [facility]. All rights reserved. Clinically reviewed by Sahara Bruce, PharmD, BCACP. [employer] [id number] - REV 06/23.  COVID-19 Outpatient Treatments  Your care team can help you find the best treatments for COVID-19. Talk to a health care provider or refer to the FDA medicine fact sheets below.  Paxlovid (nirmatrelvir and ritonavir): https://www.paxlovid.Sova/resources  Molnupiravir (Lagevrio): https://www.fda.gov/media/950381/download  Important: We can only prescribe Paxlovid or Molnupiravir when it can be started within 5 days of first having symptoms.  Paxlovid (nimatrelvir and ritonavir)  How it works  Two medicines (nirmatrelvir and ritonavir) are taken together. They stop the virus from growing. Less amount of virus is easier for your body to fight.  Benefits  Lowers risk of a hospital stay or death from COVID-19.  How to take  Medicine comes in a daily container with both medicine tablets. Take by mouth twice daily (once in the morning, once at night) for 5 days.  The number of tablets to take varies by patient.  Don't chew or break capsules. Swallow whole.  When to take  Take it as soon as possible and within 5 days of your first symptoms.  Who can take it  Patients must be 12 years or older weigh at least 88 pounds. Paxlovid is the preferred treatment for pregnant patients.  Possible side effects  Can cause altered sense of taste, diarrhea (loose, watery stools), high blood pressure, muscle aches.  Medicine conflicts  Some medicines may conflict with Paxlovid and may cause serious side effects.  Tell your care team about all the medicines you take, including prescription and over-the-counter medicines, vitamins, and herbal supplements.  Your care team will review your medicines to make sure that you can safely take Paxlovid.  Cautions  Paxlovid is not advised for patients with severe  kidney or liver disease. If you have kidney or liver problems, the dose may need to be changed.  If you're pregnant or breastfeeding, talk to your care team about your options.  If you take hormonal birth control (such as the Pill), then you or your partner should also use a non-hormonal form of birth control (such as a condom). Keep doing this for 1 menstrual cycle after your last dose of Paxlovid.  Molnupiravir (lagevrio)  How it works  Stops the virus from growing. Less amount of virus is easier for your body to fight.  Benefits  Lowers risk of a hospital stay or death from COVID-19.  How to take  Take 4 capsules by mouth every 12 hours (4 in the morning and 4 at night) for 5 days. Don't chew or break capsules. Swallow whole.  When to take  Take as soon as possible and within 5 days of your first symptoms.  Who can take it  Patients must be 18 years or older.   Possible side effects  Diarrhea (loose, watery stools), nausea (feeling sick to your stomach), dizziness, headaches.  Medicine conflicts  Right now, there are no known conflicts with other drugs. But tell your care team about all medicines you take.  Cautions  This medicine is not advised for patients who are pregnant.  If you are someone who could become pregnant, use trusted birth control until 4 days after your last dose of molnupiravir.  If your partner could become pregnant, you should use trusted birth control until 3 months after your last dose of molnupiravir.

## 2023-11-30 DIAGNOSIS — J45.50 SEVERE PERSISTENT ASTHMA WITHOUT COMPLICATION (H): ICD-10-CM

## 2023-11-30 RX ORDER — BUDESONIDE AND FORMOTEROL FUMARATE DIHYDRATE 160; 4.5 UG/1; UG/1
2 AEROSOL RESPIRATORY (INHALATION) 2 TIMES DAILY
Qty: 10.2 G | OUTPATIENT
Start: 2023-11-30

## 2023-12-07 ENCOUNTER — VIRTUAL VISIT (OUTPATIENT)
Dept: FAMILY MEDICINE | Facility: CLINIC | Age: 53
End: 2023-12-07
Payer: COMMERCIAL

## 2023-12-07 DIAGNOSIS — I25.10 NONOBSTRUCTIVE ATHEROSCLEROSIS OF CORONARY ARTERY: ICD-10-CM

## 2023-12-07 DIAGNOSIS — G44.209 TENSION HEADACHE: ICD-10-CM

## 2023-12-07 DIAGNOSIS — J45.50 SEVERE PERSISTENT ASTHMA WITHOUT COMPLICATION (H): ICD-10-CM

## 2023-12-07 DIAGNOSIS — J30.81 CAT ALLERGIES: ICD-10-CM

## 2023-12-07 DIAGNOSIS — I10 BENIGN ESSENTIAL HYPERTENSION: ICD-10-CM

## 2023-12-07 DIAGNOSIS — I25.10 CORONARY ARTERY DISEASE INVOLVING NATIVE CORONARY ARTERY OF NATIVE HEART WITHOUT ANGINA PECTORIS: ICD-10-CM

## 2023-12-07 DIAGNOSIS — K21.00 GASTROESOPHAGEAL REFLUX DISEASE WITH ESOPHAGITIS WITHOUT HEMORRHAGE: Primary | ICD-10-CM

## 2023-12-07 DIAGNOSIS — E78.5 HYPERLIPIDEMIA LDL GOAL <70: ICD-10-CM

## 2023-12-07 DIAGNOSIS — R06.02 SHORTNESS OF BREATH: ICD-10-CM

## 2023-12-07 PROCEDURE — 99214 OFFICE O/P EST MOD 30 MIN: CPT | Mod: VID | Performed by: FAMILY MEDICINE

## 2023-12-07 RX ORDER — ALBUTEROL SULFATE 90 UG/1
1-2 AEROSOL, METERED RESPIRATORY (INHALATION) EVERY 6 HOURS PRN
Qty: 18 G | Refills: 1 | Status: SHIPPED | OUTPATIENT
Start: 2023-12-07

## 2023-12-07 RX ORDER — MONTELUKAST SODIUM 10 MG/1
10 TABLET ORAL AT BEDTIME
Qty: 90 TABLET | Refills: 3 | Status: CANCELLED | OUTPATIENT
Start: 2023-12-07

## 2023-12-07 RX ORDER — HYDROCHLOROTHIAZIDE 25 MG/1
25 TABLET ORAL DAILY
Qty: 90 TABLET | Refills: 3 | Status: CANCELLED | OUTPATIENT
Start: 2023-12-07

## 2023-12-07 RX ORDER — NORTRIPTYLINE HCL 10 MG
10 CAPSULE ORAL AT BEDTIME
Qty: 91 CAPSULE | Refills: 3 | Status: CANCELLED | OUTPATIENT
Start: 2023-12-07

## 2023-12-07 RX ORDER — AMLODIPINE BESYLATE 2.5 MG/1
TABLET ORAL
Qty: 90 TABLET | Refills: 1 | Status: SHIPPED | OUTPATIENT
Start: 2023-12-07 | End: 2024-05-21

## 2023-12-07 RX ORDER — ATORVASTATIN CALCIUM 40 MG/1
40 TABLET, FILM COATED ORAL DAILY
Qty: 90 TABLET | Refills: 3 | Status: CANCELLED | OUTPATIENT
Start: 2023-12-07

## 2023-12-07 ASSESSMENT — ASTHMA QUESTIONNAIRES
QUESTION_1 LAST FOUR WEEKS HOW MUCH OF THE TIME DID YOUR ASTHMA KEEP YOU FROM GETTING AS MUCH DONE AT WORK, SCHOOL OR AT HOME: A LITTLE OF THE TIME
QUESTION_4 LAST FOUR WEEKS HOW OFTEN HAVE YOU USED YOUR RESCUE INHALER OR NEBULIZER MEDICATION (SUCH AS ALBUTEROL): ONCE A WEEK OR LESS
ACT_TOTALSCORE: 21
QUESTION_5 LAST FOUR WEEKS HOW WOULD YOU RATE YOUR ASTHMA CONTROL: WELL CONTROLLED
QUESTION_3 LAST FOUR WEEKS HOW OFTEN DID YOUR ASTHMA SYMPTOMS (WHEEZING, COUGHING, SHORTNESS OF BREATH, CHEST TIGHTNESS OR PAIN) WAKE YOU UP AT NIGHT OR EARLIER THAN USUAL IN THE MORNING: NOT AT ALL
QUESTION_2 LAST FOUR WEEKS HOW OFTEN HAVE YOU HAD SHORTNESS OF BREATH: ONCE OR TWICE A WEEK
ACT_TOTALSCORE: 21

## 2023-12-07 NOTE — PATIENT INSTRUCTIONS
Increasing magnesium, potassium and fiber as well as lowering salt and alcohol intake can be very helpful in lowering your blood pressure  Hibiscus tea also can have beneficial effects in lowering blood pressure  If you want to try going off the omeprazole, you can try taking it every other day for a month and then stopping the medicine. If you see an increase in your need for albuterol, then I would recommend going back on the omeprazole (since it seems that acid reflux is a trigger for your asthma)  Increase amlodipine to 7.5mg daily (5mg tablet plus 2.5mg tablet once daily)  Check your blood pressure daily for two weeks and send me a uShip message with your readings at the end of the two weeks.   Goal blood pressure is definitely less than 140/90, and ideally less than 130/80.     Dietary Approaches to Stop Hypertension (The DASH Diet)   What is hypertension?   Hypertension is the term for blood pressure that is consistently higher than normal. Blood pressure is the force of blood against artery walls. Blood pressure can be unhealthy if it is above 120/80. The higher your blood pressure, the greater the health risk.     High blood pressure can be controlled if you take these steps:   Maintain a healthy weight.   Be physically active.   Follow a healthy eating plan, which includes foods lower in salt and sodium.   If you drink alcoholic beverages, do so in moderation.   As noted in this list, diet affects high blood pressure. Following the DASH diet and reducing the amount of sodium in your diet will help lower your blood pressure. It will also help prevent high blood pressure.     What is the DASH diet?   Dietary Approaches to Stop Hypertension (DASH) is a diet that is low in saturated fat, cholesterol, and total fat. It emphasizes fruits, vegetables, and low-fat dairy foods. The DASH diet also includes whole-grain products, fish, poultry, and nuts. It encourages fewer servings of red meat, sweets, and  sugar-containing beverages. It is rich in magnesium, potassium, and calcium, as well as protein and fiber.     How do I get started on the DASH diet?   The DASH diet requires no special foods and has no hard-to-follow recipes. Start by seeing how DASH compares with your current eating habits.  The DASH eating plan shown is based on 2,000 calories a day. Your health care provider or a dietitian can help you determine how many calories a day you need. Most adults need somewhere between 1600 and 2800 calories a day. Serving sizes will vary between 1/2 cup and 1 1/4 cups.     Check the product's nutrition label to determine serving sizes of particular products.    Food Group   Number of Servings   Examples of Serving Size    Grains and grain products   7 to 8   1 slice of bread    1 cup ready-to-eat cold cereal    1/2 cup cooked rice, pasta, or   cereal    Vegetables   4 to 5   1 cup raw leafy vegetable    1/2 cup cooked vegetable    6 oz. vegetable juice    Fruits   4 to 5   1 medium fruit       1/4 cup dried fruit    1/2 cup fresh, frozen, or canned fruit    6 oz fruit juice    Low-fat or fat-free dairy foods   2 to 3   8 oz milk    1 cup yogurt    1 1/2 oz cheese    Lean meats, poultry, or fish   2 or fewer   3 oz cooked lean meat, skinless poultry, or fish    Nuts, seeds, and dry beans   4 to 5 per week   1/3 cup or 1 1/2 oz nuts    1 tablespoon or 1/2 oz seeds    1/2 cup cooked dry beans     Fats and oils   2 to 3   1 teaspoon soft margarine    1 tablespoon low-fat mayonnaise    2 tablespoons light salad dressing    1 teaspoon vegetable oil   Sweets   5 per week   1 tablespoon sugar              8 oz lemonade    1 tablespoon jelly or jam     1/2 oz jelly beans     Make changes gradually. Here are some suggestions that might help:     If you now eat 1 or 2 servings of vegetables a day, add a serving at lunch and another at dinner.   If you don't eat fruit now or have only juice at breakfast, add a serving to your  meals or have it as a snack.   Drink milk or water with lunch or dinner instead of soda, sugar-sweetened tea, or alcohol. Choose low-fat (1%) or fat-free (skim) dairy products to reduce how much saturated fat, total fat, cholesterol, and calories you eat. If you have trouble digesting dairy products, try taking lactase enzyme pills or drops (available at drugstores and groceries) with the dairy foods. Or buy lactose-free milk or milk with lactase enzyme added to it.   Read food labels on margarines and salad dressings to choose products lowest in fat.   If you now eat large portions of meat, cut back gradually--by a half or a third at each meal. Limit meat to 6 ounces a day (2 servings). Three to four ounces is about the size of a deck of cards.   Have 2 or more vegetarian-style (meatless) meals each week. Increase servings of vegetables, rice, pasta, and beans in all meals. Try casseroles and pasta, and stir-steinberg dishes, which have less meat and more vegetables, grains, and beans.   Use fruits canned in their own juice. Fresh fruits require little or no preparation. Dried fruits are a good choice to carry with you or to have ready in the car.   Try these snacks ideas: unsalted pretzels or nuts mixed with raisins, carol crackers, low-fat and fat-free yogurt and frozen yogurt, popcorn with no salt or butter added, and raw vegetables.   Choose whole grain foods to get more nutrients, including minerals and fiber. For example, choose whole-wheat bread or whole-grain cereals.   Use fresh, frozen, or no-salt-added canned vegetables.     Remember to also reduce the salt and sodium in your diet. Try to have no more than 2000 milligrams (mg) of sodium per day, with a goal of further reducing the sodium to 1500 mg per day. Three important ways to reduce sodium are:     Use reduced-sodium or no-salt-added food products.   Use less salt when you prepare foods and do not add salt to your food at the table.   Read fool labels.  Aim for foods that are less than 5 percent of the daily value of sodium.     The DASH eating plan was not designed for weight loss. But it contains many lower calorie foods, such as fruits and vegetables. You can make it lower in calories by replacing higher calorie foods with more fruits and vegetables. Some ideas to increase fruits and vegetables and decrease calories include:     Eat a medium apple instead of four shortbread cookies. You'll save 80 calories.   Eat 1/4 cup of dried apricots instead of a 2-ounce bag of pork rinds. You'll save 230 calories.   Have a hamburger that's 3 ounces instead of 6 ounces. Add a 1/2 cup serving of carrots and a 1/2 cup serving of spinach. You'll save more than 200 calories.   Instead of 5 ounces of chicken, have a stir steinberg with 2 ounces of chicken and 1 and 1/2 cups of raw vegetables. Use a small amount of vegetable oil. You'll save 50 calories.   Have a 1/2 cup serving of low-fat frozen yogurt instead of a 1 and 1/2 ounce milk chocolate bar. You'll save about 110 calories.   Use low-fat or fat-free condiments, such as fat free salad dressings.   Eat smaller portions--cut back gradually.   Use food labels to compare fat content in packaged foods. Items marked low-fat or fat-free may be lower in fat without being lower in calories than their regular versions.   Limit foods with lots of added sugar, such as pies, flavored yogurts, candy bars, ice cream, sherbet, regular soft drinks, and fruit drinks.   Drink water or club soda instead of cola or other soda drinks.     Based on National Institutes of Health Guidelines. Published by [employer].   Copyright   2004 [employer] and/or one of its subsidiaries. All Rights Reserved.

## 2023-12-07 NOTE — PROGRESS NOTES
Fito is a 53 year old who is being evaluated via a billable video visit.      How would you like to obtain your AVS? [employer]  If the video visit is dropped, the invitation should be resent by: Text to cell phone: [phone]  Will anyone else be joining your video visit? No          Assessment & Plan      Benign essential hypertension  Uncontrolled.  Home bps have been  into the 140s/90s. Increase amlodipine to 7.5mg daily.   Continue hydrochlorothiazide 25 mg daily.  He will send a [employer] message with 2 weeks of daily pressures in 2 weeks.       He expresses today he would like to work more on lifestyle changes for blood pressure and requested additional information on dietary changes again today. See ptinstructions    He has not been checking consistently at home since increasing hydrochlorothiazide dose.  Continues hydrochlorothiazide 25 mg daily. Will check bp daily for two weeks and follow up with me virtually   CMP and urine albumin today    Severe persistent asthma without complication  Improved especially since starting omeprazole Continue symbicort 2 puffs twice daily. Has been using albuterol very rarely.     Uncontrolled. It turns out, he never used the symbicort as instructed, only used 1 puff twice daily. Will increase to 2 puffs twice daily and as needed for a total of up to 12 puffs per day by new IRON guidelines. Refills sent today. Starting singulair 10mg daily as well. Recommended follow up with pulmonology.      Much improved on Symbicort.  Has rarely needed to use albuterol.    Was recommended to follow-up with pulmonology in January, but he has not scheduled.  Noted several allergic triggers on his respiratory panel.  I offered allergist referral.  He declined for now, but may consider in the future.  Denies allergic rhinitis symptoms  Last Pulmonology visit was on 10/12/2022.          Mild nonobstructive coronary artery disease - CAC score of 83  Per cardiology note from 9/19/22, they  recommended  ASA 81mg daily for secondary prevention of CAD     Hyperlipidemia goal <70   continues atorvastatin 40mg daily. Lipids look good. LDL 71 is reasonable.      Tension Headache  Continues nortriptyline 10mg daily which works well for him.      GERD  Denies acid reflux symptoms. Doing well on omeprazole. Asthma symptoms improved significantly after starting. He would like to work on dietary changes so that he can stop the omeprazole. Has cut back on caffeine and is cutting back on alcohol.       Patient Instructions   Increasing magnesium, potassium and fiber as well as lowering salt and alcohol intake can be very helpful in lowering your blood pressure  Hibiscus tea also can have beneficial effects in lowering blood pressure  If you want to try going off the omeprazole, you can try taking it every other day for a month and then stopping the medicine. If you see an increase in your need for albuterol, then I would recommend going back on the omeprazole (since it seems that acid reflux is a trigger for your asthma)  Increase amlodipine to 7.5mg daily (5mg tablet plus 2.5mg tablet once daily)  Check your blood pressure daily for two weeks and send me a [employer] message with your readings at the end of the two weeks.   Goal blood pressure is definitely less than 140/90, and ideally less than 130/80.     Dietary Approaches to Stop Hypertension (The DASH Diet)   What is hypertension?   Hypertension is the term for blood pressure that is consistently higher than normal. Blood pressure is the force of blood against artery walls. Blood pressure can be unhealthy if it is above 120/80. The higher your blood pressure, the greater the health risk.     High blood pressure can be controlled if you take these steps:   Maintain a healthy weight.   Be physically active.   Follow a healthy eating plan, which includes foods lower in salt and sodium.   If you drink alcoholic beverages, do so in moderation.   As noted in this  list, diet affects high blood pressure. Following the DASH diet and reducing the amount of sodium in your diet will help lower your blood pressure. It will also help prevent high blood pressure.     What is the DASH diet?   Dietary Approaches to Stop Hypertension (DASH) is a diet that is low in saturated fat, cholesterol, and total fat. It emphasizes fruits, vegetables, and low-fat dairy foods. The DASH diet also includes whole-grain products, fish, poultry, and nuts. It encourages fewer servings of red meat, sweets, and sugar-containing beverages. It is rich in magnesium, potassium, and calcium, as well as protein and fiber.     How do I get started on the DASH diet?   The DASH diet requires no special foods and has no hard-to-follow recipes. Start by seeing how DASH compares with your current eating habits.  The DASH eating plan shown is based on 2,000 calories a day. Your health care provider or a dietitian can help you determine how many calories a day you need. Most adults need somewhere between 1600 and 2800 calories a day. Serving sizes will vary between 1/2 cup and 1 1/4 cups.     Check the product's nutrition label to determine serving sizes of particular products.    Food Group   Number of Servings   Examples of Serving Size    Grains and grain products   7 to 8   1 slice of bread    1 cup ready-to-eat cold cereal    1/2 cup cooked rice, pasta, or   cereal    Vegetables   4 to 5   1 cup raw leafy vegetable    1/2 cup cooked vegetable    6 oz. vegetable juice    Fruits   4 to 5   1 medium fruit       1/4 cup dried fruit    1/2 cup fresh, frozen, or canned fruit    6 oz fruit juice    Low-fat or fat-free dairy foods   2 to 3   8 oz milk    1 cup yogurt    1 1/2 oz cheese    Lean meats, poultry, or fish   2 or fewer   3 oz cooked lean meat, skinless poultry, or fish    Nuts, seeds, and dry beans   4 to 5 per week   1/3 cup or 1 1/2 oz nuts    1 tablespoon or 1/2 oz seeds    1/2 cup cooked dry beans     Fats  and oils   2 to 3   1 teaspoon soft margarine    1 tablespoon low-fat mayonnaise    2 tablespoons light salad dressing    1 teaspoon vegetable oil   Sweets   5 per week   1 tablespoon sugar              8 oz lemonade    1 tablespoon jelly or jam     1/2 oz jelly beans     Make changes gradually. Here are some suggestions that might help:     If you now eat 1 or 2 servings of vegetables a day, add a serving at lunch and another at dinner.   If you don't eat fruit now or have only juice at breakfast, add a serving to your meals or have it as a snack.   Drink milk or water with lunch or dinner instead of soda, sugar-sweetened tea, or alcohol. Choose low-fat (1%) or fat-free (skim) dairy products to reduce how much saturated fat, total fat, cholesterol, and calories you eat. If you have trouble digesting dairy products, try taking lactase enzyme pills or drops (available at drugstores and groceries) with the dairy foods. Or buy lactose-free milk or milk with lactase enzyme added to it.   Read food labels on margarines and salad dressings to choose products lowest in fat.   If you now eat large portions of meat, cut back gradually--by a half or a third at each meal. Limit meat to 6 ounces a day (2 servings). Three to four ounces is about the size of a deck of cards.   Have 2 or more vegetarian-style (meatless) meals each week. Increase servings of vegetables, rice, pasta, and beans in all meals. Try casseroles and pasta, and stir-steinberg dishes, which have less meat and more vegetables, grains, and beans.   Use fruits canned in their own juice. Fresh fruits require little or no preparation. Dried fruits are a good choice to carry with you or to have ready in the car.   Try these snacks ideas: unsalted pretzels or nuts mixed with raisins, carol crackers, low-fat and fat-free yogurt and frozen yogurt, popcorn with no salt or butter added, and raw vegetables.   Choose whole grain foods to get more nutrients, including minerals  and fiber. For example, choose whole-wheat bread or whole-grain cereals.   Use fresh, frozen, or no-salt-added canned vegetables.     Remember to also reduce the salt and sodium in your diet. Try to have no more than 2000 milligrams (mg) of sodium per day, with a goal of further reducing the sodium to 1500 mg per day. Three important ways to reduce sodium are:     Use reduced-sodium or no-salt-added food products.   Use less salt when you prepare foods and do not add salt to your food at the table.   Read fool labels. Aim for foods that are less than 5 percent of the daily value of sodium.     The DASH eating plan was not designed for weight loss. But it contains many lower calorie foods, such as fruits and vegetables. You can make it lower in calories by replacing higher calorie foods with more fruits and vegetables. Some ideas to increase fruits and vegetables and decrease calories include:     Eat a medium apple instead of four shortbread cookies. You'll save 80 calories.   Eat 1/4 cup of dried apricots instead of a 2-ounce bag of pork rinds. You'll save 230 calories.   Have a hamburger that's 3 ounces instead of 6 ounces. Add a 1/2 cup serving of carrots and a 1/2 cup serving of spinach. You'll save more than 200 calories.   Instead of 5 ounces of chicken, have a stir steinberg with 2 ounces of chicken and 1 and 1/2 cups of raw vegetables. Use a small amount of vegetable oil. You'll save 50 calories.   Have a 1/2 cup serving of low-fat frozen yogurt instead of a 1 and 1/2 ounce milk chocolate bar. You'll save about 110 calories.   Use low-fat or fat-free condiments, such as fat free salad dressings.   Eat smaller portions--cut back gradually.   Use food labels to compare fat content in packaged foods. Items marked low-fat or fat-free may be lower in fat without being lower in calories than their regular versions.   Limit foods with lots of added sugar, such as pies, flavored yogurts, candy bars, ice cream, sherbet,  "regular soft drinks, and fruit drinks.   Drink water or club soda instead of cola or other soda drinks.     Based on National Institutes of Health Guidelines. Published by [employer].   Copyright   2004 [employer] and/or one of its subsidiaries. All Rights Reserved.               BMI:   Estimated body mass index is 29.29 kg/m  as calculated from the following:    Height as of 9/20/23: 1.956 m (6' 5\").    Weight as of 9/20/23: 112 kg (247 lb).      Mona Szymanski MD   [facility] RAMAKRISHNA Payton is a 53 year old, presenting for the following health issues:  No chief complaint on file.       History of Present Illness     Asthma:  He presents for follow up of asthma.  He has no cough, no wheezing, and some shortness of breath.  He is using a relief medication a few times a month. He typically misses taking his controller medication 1 time(s) per week. Patient is aware of the following triggers: same as previous visit and occupational exposure. The patient has not had a visit to the Emergency Room, Urgent Care or Hospital due to asthma since the last clinic visit.     Hypertension: He presents for follow up of hypertension.  He does not check blood pressure  regularly outside of the clinic. Outside blood pressures have been over 140/90. He does not follow a low salt diet.     He eats 2-3 servings of fruits and vegetables daily.He consumes 0 sweetened beverage(s) daily.He exercises with enough effort to increase his heart rate 10 to 19 minutes per day.  He exercises with enough effort to increase his heart rate 4 days per week. He is missing 1 dose(s) of medications per week.  He is not taking prescribed medications regularly due to remembering to take.            Review of Systems        Objective           Vitals:  No vitals were obtained today due to virtual visit.    Physical Exam   GENERAL: Healthy, alert and no distress  EYES: Eyes grossly normal to " inspection.  No discharge or erythema, or obvious scleral/conjunctival abnormalities.  RESP: No audible wheeze, cough, or visible cyanosis.  No visible retractions or increased work of breathing.    SKIN: Visible skin clear. No significant rash, abnormal pigmentation or lesions.  NEURO: Cranial nerves grossly intact.  Mentation and speech appropriate for age.  PSYCH: Mentation appears normal, affect normal/bright, judgement and insight intact, normal speech and appearance well-groomed.    Office Visit on 09/20/2023   Component Date Value Ref Range Status    WBC Count 09/20/2023 6.5  4.0 - 11.0 10e3/uL Final    RBC Count 09/20/2023 5.26  4.40 - 5.90 10e6/uL Final    Hemoglobin 09/20/2023 15.5  13.3 - 17.7 g/dL Final    Hematocrit 09/20/2023 46.7  40.0 - 53.0 % Final    MCV 09/20/2023 89  78 - 100 fL Final    MCH 09/20/2023 29.5  26.5 - 33.0 pg Final    MCHC 09/20/2023 33.2  31.5 - 36.5 g/dL Final    RDW 09/20/2023 11.9  10.0 - 15.0 % Final    Platelet Count 09/20/2023 297  150 - 450 10e3/uL Final              Video-Visit Details    Type of service:  Video Visit     Originating Location (pt. Location): Home    Distant Location (provider location):  Off-site  Platform used for Video Visit: Juan Alberto

## 2023-12-20 ENCOUNTER — PRE VISIT (OUTPATIENT)
Dept: ALLERGY | Facility: CLINIC | Age: 53
End: 2023-12-20

## 2024-02-05 ENCOUNTER — MYC MEDICAL ADVICE (OUTPATIENT)
Dept: FAMILY MEDICINE | Facility: CLINIC | Age: 54
End: 2024-02-05
Payer: COMMERCIAL

## 2024-03-14 ENCOUNTER — ALLIED HEALTH/NURSE VISIT (OUTPATIENT)
Dept: FAMILY MEDICINE | Facility: CLINIC | Age: 54
End: 2024-03-14
Payer: COMMERCIAL

## 2024-03-14 DIAGNOSIS — Z23 ENCOUNTER FOR IMMUNIZATION: Primary | ICD-10-CM

## 2024-03-14 PROCEDURE — 90471 IMMUNIZATION ADMIN: CPT

## 2024-03-14 PROCEDURE — 99207 PR NO CHARGE NURSE ONLY: CPT

## 2024-03-14 PROCEDURE — 90750 HZV VACC RECOMBINANT IM: CPT

## 2024-03-14 NOTE — PROGRESS NOTES
Prior to immunization administration, verified patients identity using patient s name and date of birth. Please see Immunization Activity for additional information.     Screening Questionnaire for Adult Immunization    Are you sick today?   No   Do you have allergies to medications, food, a vaccine component or latex?   No   Have you ever had a serious reaction after receiving a vaccination?   No   Do you have a long-term health problem with heart, lung, kidney, or metabolic disease (e.g., diabetes), asthma, a blood disorder, no spleen, complement component deficiency, a cochlear implant, or a spinal fluid leak?  Are you on long-term aspirin therapy?   Yes   Do you have cancer, leukemia, HIV/AIDS, or any other immune system problem?   No   Do you have a parent, brother, or sister with an immune system problem?   No   In the past 3 months, have you taken medications that affect  your immune system, such as prednisone, other steroids, or anticancer drugs; drugs for the treatment of rheumatoid arthritis, Crohn s disease, or psoriasis; or have you had radiation treatments?   No   Have you had a seizure, or a brain or other nervous system problem?   No   During the past year, have you received a transfusion of blood or blood    products, or been given immune (gamma) globulin or antiviral drug?   No   For women: Are you pregnant or is there a chance you could become       pregnant during the next month?   No   Have you received any vaccinations in the past 4 weeks?   No     Immunization questionnaire was positive for at least one answer.  Notified [place].    I have reviewed the following standing orders:   This patient is due and qualifies for the Zoster vaccine.    Click here for Zoster Standing Order    I have reviewed the vaccines inclusion and exclusion criteria; There were concerns regarding the following exclusions, [place]. I have brought them to a provider who approved vaccination.    Patient instructed to remain  in clinic for 15 minutes afterwards, and to report any adverse reactions.     Screening performed by Carmela Carlton MA on 3/14/2024 at 1:55 PM.

## 2024-03-25 DIAGNOSIS — J30.81 CAT ALLERGIES: ICD-10-CM

## 2024-03-25 DIAGNOSIS — J45.50 SEVERE PERSISTENT ASTHMA WITHOUT COMPLICATION (H): ICD-10-CM

## 2024-03-25 RX ORDER — MONTELUKAST SODIUM 10 MG/1
1 TABLET ORAL AT BEDTIME
Qty: 90 TABLET | Refills: 3 | OUTPATIENT
Start: 2024-03-25

## 2024-04-30 ENCOUNTER — PATIENT OUTREACH (OUTPATIENT)
Dept: CARE COORDINATION | Facility: CLINIC | Age: 54
End: 2024-04-30
Payer: COMMERCIAL

## 2024-05-14 ENCOUNTER — PATIENT OUTREACH (OUTPATIENT)
Dept: CARE COORDINATION | Facility: CLINIC | Age: 54
End: 2024-05-14
Payer: COMMERCIAL

## 2024-05-21 ENCOUNTER — OFFICE VISIT (OUTPATIENT)
Dept: FAMILY MEDICINE | Facility: CLINIC | Age: 54
End: 2024-05-21
Payer: COMMERCIAL

## 2024-05-21 VITALS
BODY MASS INDEX: 28.19 KG/M2 | WEIGHT: 231.5 LBS | OXYGEN SATURATION: 98 % | RESPIRATION RATE: 18 BRPM | SYSTOLIC BLOOD PRESSURE: 139 MMHG | TEMPERATURE: 97.5 F | HEIGHT: 76 IN | HEART RATE: 74 BPM | DIASTOLIC BLOOD PRESSURE: 84 MMHG

## 2024-05-21 DIAGNOSIS — J30.81 CAT ALLERGIES: ICD-10-CM

## 2024-05-21 DIAGNOSIS — I10 BENIGN ESSENTIAL HYPERTENSION: ICD-10-CM

## 2024-05-21 DIAGNOSIS — L82.1 SEBORRHEIC KERATOSIS: ICD-10-CM

## 2024-05-21 DIAGNOSIS — G44.209 TENSION HEADACHE: ICD-10-CM

## 2024-05-21 DIAGNOSIS — K21.00 GASTROESOPHAGEAL REFLUX DISEASE WITH ESOPHAGITIS WITHOUT HEMORRHAGE: ICD-10-CM

## 2024-05-21 DIAGNOSIS — E78.5 HYPERLIPIDEMIA LDL GOAL <70: ICD-10-CM

## 2024-05-21 DIAGNOSIS — I25.10 CORONARY ARTERY DISEASE INVOLVING NATIVE CORONARY ARTERY OF NATIVE HEART WITHOUT ANGINA PECTORIS: Primary | ICD-10-CM

## 2024-05-21 DIAGNOSIS — J45.50 SEVERE PERSISTENT ASTHMA WITHOUT COMPLICATION (H): ICD-10-CM

## 2024-05-21 PROCEDURE — 80061 LIPID PANEL: CPT | Performed by: FAMILY MEDICINE

## 2024-05-21 PROCEDURE — 90480 ADMN SARSCOV2 VAC 1/ONLY CMP: CPT | Performed by: FAMILY MEDICINE

## 2024-05-21 PROCEDURE — 36415 COLL VENOUS BLD VENIPUNCTURE: CPT | Performed by: FAMILY MEDICINE

## 2024-05-21 PROCEDURE — 91320 SARSCV2 VAC 30MCG TRS-SUC IM: CPT | Performed by: FAMILY MEDICINE

## 2024-05-21 PROCEDURE — 99214 OFFICE O/P EST MOD 30 MIN: CPT | Performed by: FAMILY MEDICINE

## 2024-05-21 PROCEDURE — 82570 ASSAY OF URINE CREATININE: CPT | Performed by: FAMILY MEDICINE

## 2024-05-21 PROCEDURE — 82043 UR ALBUMIN QUANTITATIVE: CPT | Performed by: FAMILY MEDICINE

## 2024-05-21 RX ORDER — MONTELUKAST SODIUM 10 MG/1
10 TABLET ORAL AT BEDTIME
Qty: 90 TABLET | Refills: 3 | Status: SHIPPED | OUTPATIENT
Start: 2024-05-21

## 2024-05-21 RX ORDER — NORTRIPTYLINE HCL 10 MG
10 CAPSULE ORAL AT BEDTIME
Qty: 91 CAPSULE | Refills: 3 | Status: SHIPPED | OUTPATIENT
Start: 2024-05-21

## 2024-05-21 RX ORDER — AMLODIPINE BESYLATE 5 MG/1
5 TABLET ORAL DAILY
Qty: 90 TABLET | Refills: 1 | Status: SHIPPED | OUTPATIENT
Start: 2024-05-21

## 2024-05-21 RX ORDER — ATORVASTATIN CALCIUM 40 MG/1
40 TABLET, FILM COATED ORAL DAILY
Qty: 90 TABLET | Refills: 3 | Status: SHIPPED | OUTPATIENT
Start: 2024-05-21

## 2024-05-21 RX ORDER — HYDROCHLOROTHIAZIDE 25 MG/1
25 TABLET ORAL DAILY
Qty: 90 TABLET | Refills: 3 | Status: SHIPPED | OUTPATIENT
Start: 2024-05-21

## 2024-05-21 ASSESSMENT — PAIN SCALES - GENERAL: PAINLEVEL: NO PAIN (0)

## 2024-05-21 NOTE — PATIENT INSTRUCTIONS
You can use the budesonide as needed for up to 8 puffs in the day (in the same way you would use the albuterol, but can use this instead) in addition to the 2 puffs twice daily.   Start omeprazole 20mg daily.

## 2024-05-21 NOTE — PROGRESS NOTES
Assessment & Plan     Benign essential hypertension  Controlled.  Continues amlodipine 5mg daily, hydrochlorothiazide 25 mg daily.      He expresses today he would like to work more on lifestyle changes for blood pressure and requested additional information on dietary changes again today. See ptinstructions     He has not been checking consistently at home since increasing hydrochlorothiazide dose.  Continues hydrochlorothiazide 25 mg daily. Will check bp daily for two weeks and follow up with me virtually   CMP and urine albumin today     Severe persistent asthma without complication  GERD has been a trigger for him in the past.  will restart omeprazole as below.  Continue symbicort 2 puffs twice daily and discussed recommendation to use it as needed an additional 8 times a day per IRON guidelines if he wants-he has not tried that yet.  Continue singular 10 mg daily     Uncontrolled. It turns out, he never used the symbicort as instructed, only used 1 puff twice daily. Will increase to 2 puffs twice daily and as needed for a total of up to 12 puffs per day by new IRON guidelines. Refills sent today. Starting singulair 10mg daily as well. Recommended follow up with pulmonology.      Much improved on Symbicort.  Has rarely needed to use albuterol.    Was recommended to follow-up with pulmonology in January, but he has not scheduled.  Noted several allergic triggers on his respiratory panel.  I offered allergist referral.  He declined for now, but may consider in the future.  Denies allergic rhinitis symptoms  Last Pulmonology visit was on 10/12/2022.        Mild nonobstructive coronary artery disease - CAC score of 83  Per cardiology note from 9/19/22, they recommended  ASA 81mg daily for secondary prevention of CAD     Hyperlipidemia goal <70   continues atorvastatin 40mg daily. Lipids look good. LDL 71 is reasonable.      Tension Headache  Continues nortriptyline 10mg daily which works well for him.  Refilled  "today     GERD  Restart omeprazole 20 mg daily due to recurrence of asthma symptoms.  GERD has been a trigger of his asthma in the past.  Much improved when he was on omeprazole.    Denies acid reflux symptoms. Doing well on omeprazole. Asthma symptoms improved significantly after starting. He would like to work on dietary changes so that he can stop the omeprazole. Has cut back on caffeine and is cutting back on alcohol.     Seborrheic keratosis on head  Reassurance given       e MDM list has been removed from the note.  To document MDM use the Bill based on time quick note button above the note workspace:[id number]}      BMI  Estimated body mass index is 27.9 kg/m  as calculated from the following:    Height as of this encounter: 1.94 m (6' 4.38\").    Weight as of this encounter: 105 kg (231 lb 8 oz).         Patient Instructions   You can use the budesonide as needed for up to 8 puffs in the day (in the same way you would use the albuterol, but can use this instead) in addition to the 2 puffs twice daily.   Start omeprazole 20mg daily.     Berkley Payton is a 53 year old, presenting for the following health issues:  Medication Follow-up, Asthma, and Hypertension    History of Present Illness     Asthma:  He presents for follow up of asthma.  He has no cough, no wheezing, and some shortness of breath.  He is using a relief medication a few times a month. He does not miss any doses of his controller medication throughout the week. Patient is aware of the following triggers: same as previous visit, exercise or sports, gastric reflux and occupational exposure. The patient has not had a visit to the Emergency Room, Urgent Care or Hospital due to asthma since the last clinic visit.     Hypertension: He presents for follow up of hypertension.  He does check blood pressure  regularly outside of the clinic. Outpatient blood pressures have not been over 140/90. He does not follow a low salt diet.     He eats 2-3 servings of " "fruits and vegetables daily.He consumes 0 sweetened beverage(s) daily.He exercises with enough effort to increase his heart rate 9 or less minutes per day.  He exercises with enough effort to increase his heart rate 3 or less days per week.   He is taking medications regularly.     The patient reports feeling generally good but expresses disappointment regarding today's higher blood pressure reading, contrasting with recent home measurements which were significantly lower (124/73, 123/78, 117). The patient has been tracking these readings in a journal.    The patient mentions a recent weight loss of 20 pounds, achieved through dietary changes and increased exercise, which they believe has positively impacted their health.    Despite these improvements, the patient continues to experience asthma symptoms. They express concern about using their asthma medication too frequently due to a past incident where frequent use nearly caused them to pass out. The patient also notes ongoing issues despite dietary changes aimed at reducing gastroesophageal reflux disease (GERD), which they understand can exacerbate asthma symptoms. taking omeprazole previously alleviated asthma symptoms, but he stopped the medication due to concern about being on it for long periods of time.      Additionally, the patient uses a pulse oximeter at home to monitor oxygen levels, especially when experiencing shortness of breath. They find the readings are usual range of 95-97% which occasionally drops below 95%.    Lastly, the patient mentions a concern about a spot on their head         Objective    /84 (BP Location: Right arm, Patient Position: Sitting, Cuff Size: Adult Regular)   Pulse 74   Temp 97.5  F (36.4  C) (Temporal)   Resp 18   Ht 1.94 m (6' 4.38\")   Wt 105 kg (231 lb 8 oz)   SpO2 98%   BMI 27.90 kg/m    Body mass index is 27.9 kg/m .  Physical Exam   GENERAL: alert and no distress  SKIN: no suspicious lesions or rashes and " there is 1 keratosis - seborrheic -top of head          Signed Electronically by: Mona Szymanski MD

## 2024-05-22 LAB
CHOLEST SERPL-MCNC: 122 MG/DL
CREAT UR-MCNC: 92.9 MG/DL
FASTING STATUS PATIENT QL REPORTED: NO
HDLC SERPL-MCNC: 57 MG/DL
LDLC SERPL CALC-MCNC: 56 MG/DL
MICROALBUMIN UR-MCNC: <12 MG/L
MICROALBUMIN/CREAT UR: NORMAL MG/G{CREAT}
NONHDLC SERPL-MCNC: 65 MG/DL
TRIGL SERPL-MCNC: 44 MG/DL

## 2024-05-22 NOTE — RESULT ENCOUNTER NOTE
Excellent! Please call or send a [employer] message if you have any questions. Mona Szymanski M.D.

## 2024-06-13 ENCOUNTER — ALLIED HEALTH/NURSE VISIT (OUTPATIENT)
Dept: FAMILY MEDICINE | Facility: CLINIC | Age: 54
End: 2024-06-13
Payer: COMMERCIAL

## 2024-06-13 DIAGNOSIS — Z23 ENCOUNTER FOR IMMUNIZATION: Primary | ICD-10-CM

## 2024-06-13 PROCEDURE — 90750 HZV VACC RECOMBINANT IM: CPT

## 2024-06-13 PROCEDURE — 99207 PR NO CHARGE NURSE ONLY: CPT

## 2024-06-13 PROCEDURE — 90471 IMMUNIZATION ADMIN: CPT

## 2024-06-13 NOTE — PROGRESS NOTES
Prior to immunization administration, verified patients identity using patient s name and date of birth. Please see Immunization Activity for additional information.     Screening Questionnaire for Adult Immunization    Are you sick today?   No   Do you have allergies to medications, food, a vaccine component or latex?   No   Have you ever had a serious reaction after receiving a vaccination?   No   Do you have a long-term health problem with heart, lung, kidney, or metabolic disease (e.g., diabetes), asthma, a blood disorder, no spleen, complement component deficiency, a cochlear implant, or a spinal fluid leak?  Are you on long-term aspirin therapy?   Yes   Do you have cancer, leukemia, HIV/AIDS, or any other immune system problem?   No   Do you have a parent, brother, or sister with an immune system problem?   No   In the past 3 months, have you taken medications that affect  your immune system, such as prednisone, other steroids, or anticancer drugs; drugs for the treatment of rheumatoid arthritis, Crohn s disease, or psoriasis; or have you had radiation treatments?   No   Have you had a seizure, or a brain or other nervous system problem?   No   During the past year, have you received a transfusion of blood or blood    products, or been given immune (gamma) globulin or antiviral drug?   No   For women: Are you pregnant or is there a chance you could become       pregnant during the next month?   No   Have you received any vaccinations in the past 4 weeks?   Yes     Immunization questionnaire was positive for at least one answer.  Notified [place].    I have reviewed the following standing orders:   This patient is due and qualifies for the Zoster vaccine.    Click here for Zoster Standing Order    I have reviewed the vaccines inclusion and exclusion criteria; No concerns regarding eligibility.     Patient instructed to remain in clinic for 15 minutes afterwards, and to report any adverse reactions.     Screening  performed by Huan Lucio CMA on 6/13/2024 at 8:00 AM.     Adequate: hears normal conversation without difficulty

## 2024-06-20 DIAGNOSIS — J45.50 SEVERE PERSISTENT ASTHMA WITHOUT COMPLICATION (H): ICD-10-CM

## 2024-06-20 RX ORDER — BUDESONIDE AND FORMOTEROL FUMARATE 160; 4.5 UG/1; UG/1
AEROSOL, METERED RESPIRATORY (INHALATION)
Qty: 20.4 G | Refills: 11 | Status: SHIPPED | OUTPATIENT
Start: 2024-06-20

## 2024-06-26 SDOH — HEALTH STABILITY: PHYSICAL HEALTH: ON AVERAGE, HOW MANY MINUTES DO YOU ENGAGE IN EXERCISE AT THIS LEVEL?: 40 MIN

## 2024-06-26 SDOH — HEALTH STABILITY: PHYSICAL HEALTH: ON AVERAGE, HOW MANY DAYS PER WEEK DO YOU ENGAGE IN MODERATE TO STRENUOUS EXERCISE (LIKE A BRISK WALK)?: 3 DAYS

## 2024-06-26 ASSESSMENT — SOCIAL DETERMINANTS OF HEALTH (SDOH): HOW OFTEN DO YOU GET TOGETHER WITH FRIENDS OR RELATIVES?: THREE TIMES A WEEK

## 2024-06-27 ENCOUNTER — OFFICE VISIT (OUTPATIENT)
Dept: FAMILY MEDICINE | Facility: CLINIC | Age: 54
End: 2024-06-27
Payer: COMMERCIAL

## 2024-06-27 VITALS
DIASTOLIC BLOOD PRESSURE: 84 MMHG | BODY MASS INDEX: 28.13 KG/M2 | TEMPERATURE: 97 F | SYSTOLIC BLOOD PRESSURE: 137 MMHG | OXYGEN SATURATION: 97 % | RESPIRATION RATE: 18 BRPM | HEART RATE: 63 BPM | WEIGHT: 231 LBS | HEIGHT: 76 IN

## 2024-06-27 DIAGNOSIS — Z00.00 ROUTINE GENERAL MEDICAL EXAMINATION AT A HEALTH CARE FACILITY: Primary | ICD-10-CM

## 2024-06-27 DIAGNOSIS — Z12.83 SKIN CANCER SCREENING: ICD-10-CM

## 2024-06-27 DIAGNOSIS — I10 BENIGN ESSENTIAL HYPERTENSION: ICD-10-CM

## 2024-06-27 DIAGNOSIS — Z11.59 NEED FOR HEPATITIS B SCREENING TEST: ICD-10-CM

## 2024-06-27 DIAGNOSIS — Z12.5 SCREENING FOR PROSTATE CANCER: ICD-10-CM

## 2024-06-27 DIAGNOSIS — E78.5 HYPERLIPIDEMIA LDL GOAL <70: ICD-10-CM

## 2024-06-27 DIAGNOSIS — K21.00 GASTROESOPHAGEAL REFLUX DISEASE WITH ESOPHAGITIS WITHOUT HEMORRHAGE: ICD-10-CM

## 2024-06-27 DIAGNOSIS — J45.50 SEVERE PERSISTENT ASTHMA WITHOUT COMPLICATION (H): ICD-10-CM

## 2024-06-27 LAB
ALBUMIN SERPL BCG-MCNC: 4.6 G/DL (ref 3.5–5.2)
ALP SERPL-CCNC: 97 U/L (ref 40–150)
ALT SERPL W P-5'-P-CCNC: 46 U/L (ref 0–70)
ANION GAP SERPL CALCULATED.3IONS-SCNC: 11 MMOL/L (ref 7–15)
AST SERPL W P-5'-P-CCNC: 32 U/L (ref 0–45)
BILIRUB SERPL-MCNC: 0.4 MG/DL
BUN SERPL-MCNC: 22.3 MG/DL (ref 6–20)
CALCIUM SERPL-MCNC: 9.5 MG/DL (ref 8.6–10)
CHLORIDE SERPL-SCNC: 101 MMOL/L (ref 98–107)
CREAT SERPL-MCNC: 0.9 MG/DL (ref 0.67–1.17)
DEPRECATED HCO3 PLAS-SCNC: 28 MMOL/L (ref 22–29)
EGFRCR SERPLBLD CKD-EPI 2021: >90 ML/MIN/1.73M2
GLUCOSE SERPL-MCNC: 68 MG/DL (ref 70–99)
HBV CORE AB SERPL QL IA: NONREACTIVE
HBV SURFACE AB SERPL IA-ACNC: 338 M[IU]/ML
HBV SURFACE AB SERPL IA-ACNC: REACTIVE M[IU]/ML
HBV SURFACE AG SERPL QL IA: NONREACTIVE
POTASSIUM SERPL-SCNC: 4.2 MMOL/L (ref 3.4–5.3)
PROT SERPL-MCNC: 7.4 G/DL (ref 6.4–8.3)
PSA SERPL DL<=0.01 NG/ML-MCNC: 0.6 NG/ML (ref 0–3.5)
SODIUM SERPL-SCNC: 140 MMOL/L (ref 135–145)

## 2024-06-27 PROCEDURE — 86706 HEP B SURFACE ANTIBODY: CPT | Performed by: FAMILY MEDICINE

## 2024-06-27 PROCEDURE — 86704 HEP B CORE ANTIBODY TOTAL: CPT | Performed by: FAMILY MEDICINE

## 2024-06-27 PROCEDURE — 36415 COLL VENOUS BLD VENIPUNCTURE: CPT | Performed by: FAMILY MEDICINE

## 2024-06-27 PROCEDURE — G0103 PSA SCREENING: HCPCS | Performed by: FAMILY MEDICINE

## 2024-06-27 PROCEDURE — 99396 PREV VISIT EST AGE 40-64: CPT | Performed by: FAMILY MEDICINE

## 2024-06-27 PROCEDURE — 99214 OFFICE O/P EST MOD 30 MIN: CPT | Mod: 25 | Performed by: FAMILY MEDICINE

## 2024-06-27 PROCEDURE — 80053 COMPREHEN METABOLIC PANEL: CPT | Performed by: FAMILY MEDICINE

## 2024-06-27 PROCEDURE — 87340 HEPATITIS B SURFACE AG IA: CPT | Performed by: FAMILY MEDICINE

## 2024-06-27 ASSESSMENT — ASTHMA QUESTIONNAIRES
QUESTION_4 LAST FOUR WEEKS HOW OFTEN HAVE YOU USED YOUR RESCUE INHALER OR NEBULIZER MEDICATION (SUCH AS ALBUTEROL): TWO OR THREE TIMES PER WEEK
QUESTION_2 LAST FOUR WEEKS HOW OFTEN HAVE YOU HAD SHORTNESS OF BREATH: ONCE A DAY
QUESTION_1 LAST FOUR WEEKS HOW MUCH OF THE TIME DID YOUR ASTHMA KEEP YOU FROM GETTING AS MUCH DONE AT WORK, SCHOOL OR AT HOME: SOME OF THE TIME
ACT_TOTALSCORE: 12
QUESTION_5 LAST FOUR WEEKS HOW WOULD YOU RATE YOUR ASTHMA CONTROL: SOMEWHAT CONTROLLED
ACT_TOTALSCORE: 12
QUESTION_3 LAST FOUR WEEKS HOW OFTEN DID YOUR ASTHMA SYMPTOMS (WHEEZING, COUGHING, SHORTNESS OF BREATH, CHEST TIGHTNESS OR PAIN) WAKE YOU UP AT NIGHT OR EARLIER THAN USUAL IN THE MORNING: FOUR OR MORE NIGHTS A WEEK

## 2024-06-27 ASSESSMENT — PAIN SCALES - GENERAL: PAINLEVEL: NO PAIN (0)

## 2024-06-27 NOTE — LETTER
Fito Frye  [address]  [facility] [address]    [id number]    June 27, 2024    Dear Fito:    In an effort to improve care for our asthmatic patients we feel it is important to provide everyone with a written plan to help in taking care of their asthma. Experts in the field of asthma care have shown that having a written Asthma Action Plan can significantly reduce the number of acute asthma flare-ups, unnecessary Emergency Room visits, and lost days from school or work.     Enclosed with this letter is an Asthma Action Plan designed for you based on your severity of asthma as determined by the review of your records.     If you have not been seen recently, your asthma severity and treatment plan may need updating. Please contact us to schedule a follow-up visit at your convenience.     Thank you for allowing me to participate in your care. If you have any further questions or problems, please contact me at [phone].     Sincerely,        Mona Szymanski MD, MD   [address] [place] [address] [phone]                      Asthma Action Plan For: Fito Frye        6/27/2024   [facility] [place] [address] [id number] [phone]    PATIENT ASTHMA SEVERITY:  Severe Persistent      =============== GREEN ZONE ==============  (doing well)  Symptoms     *Breathing is good     *No cough or wheeze     *Can work and play without cough or wheeze     *Sleep at night without cough or wheeze    Control Medications to be taken regularly to prevent asthma symptoms.  Singulair 10 mg tablet once daily  Breyna 2 puffs twice daily and as needed for a total of up to 12 puffs per day by new IRON guidelines     Quick Relief Medications to be used when sick or having asthma symptoms.  Albuterol 2 puffs every four hours as needed     For those with Exercise induced Symptoms:  *Albuterol 2  puffs 5 TO 60 minutes before exercise as needed, based on your exercise symptoms                       ============== YELLOW ZONE =========== (getting worse)  Symptoms     *Some problems breathing     *Cough, wheeze or chest tight     *Problems working or playing     *Waking at night with cough or wheezing    Continue Taking your Green Zone Mediciation  Take your quick relief medicine now - 2 puffs every 20 minutes for up to 1 hour         IF your symptoms return to the Green Zone after one hour of the quick relief treatment, THEN:  Take the quick relief medicine every 4-6 hours for 1-2 days.       IF your symptoms DO NOT return to the Green Zone after one hour of the quick relief treatment, THEN:    Take the quick relief medicine every 2 hours for up to 12 hours.      If unrelieved, call your clinic.                                                                  CALL THE OFFICE IF:     *You are having problems staying in the green zone     *If unsure of any of the above                                                          =============== RED ZONE =============  (EMERGENCY!)  Symptoms     *Lots of problems breathing     *Cannot work or play     *Getting worse instead of better     *Medicine is not helping    Take your quick relief medicine now -- 4 puffs now!  Continue your control medicines.  Call the office immediately!                                          Call an ambulance immediately if the following danger signs are present:     *You are worried about how you will get through the next 30 minutes     *Trouble walking/talking due to shortness of breath     *Lips or fingernails are blue    Go to the hospital or call for an ambulance (911) IF:     *Still in the red zone after 15 minutes AND     *You have not been able to reach your physician/office for help           Once your symptoms return to the YELLOW ZONE continue with the Yellow Zone instructions.

## 2024-06-27 NOTE — PROGRESS NOTES
Preventive Care Visit  [facility]  Mona Szymanski MD Family Medicine  2024      Assessment & Plan       ICD-10-CM    1. Routine general medical examination at a health care facility  Z00.00       2. Severe persistent asthma without complication (H28)  J45.50 Adult Pulmonary Medicine  Referral      3. Screening for prostate cancer  Z12.5 PSA, screen     PSA, screen      4. Need for hepatitis B screening test  Z11.59 Hepatitis B core antibody     Hepatitis B surface antigen     Hepatitis B Surface Antibody     Hepatitis B core antibody     Hepatitis B surface antigen     Hepatitis B Surface Antibody      5. Hyperlipidemia LDL goal <70  E78.5 Comprehensive metabolic panel (BMP + Alb, Alk Phos, ALT, AST, Total. Bili, TP)     Comprehensive metabolic panel (BMP + Alb, Alk Phos, ALT, AST, Total. Bili, TP)      6. Benign essential hypertension  I10 Comprehensive metabolic panel (BMP + Alb, Alk Phos, ALT, AST, Total. Bili, TP)     Comprehensive metabolic panel (BMP + Alb, Alk Phos, ALT, AST, Total. Bili, TP)      7. Gastroesophageal reflux disease with esophagitis without hemorrhage  K21.00 omeprazole (PRILOSEC) 20 MG DR capsule      8. Skin cancer screening  Z12.83 Adult Dermatology  Referral         Routine preventive visit  Healthy   Colon cancer screening recommended - he prefers cologuard-repeat due in      Prostate cancer screening - he would like to do PSA. His maternal  grandfather  of prostate cancer in his sixties, uncle diet of it at 72, another uncle had it age 75 and is doing fine.   Hep B screening recommended today. Has one documented dose of Twinrix from , unclear if he had any other doses.     Benign essential hypertension  Controlled.  Continues amlodipine 5mg daily, hydrochlorothiazide 25 mg daily. 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      He expresses today he would like to work more on lifestyle changes for blood pressure and  requested additional information on dietary changes again today. See ptinstructions     He has not been checking consistently at home since increasing hydrochlorothiazide dose.  Continues hydrochlorothiazide 25 mg daily. Will check bp daily for two weeks and follow up with me virtually   CMP and urine albumin today     Severe persistent asthma without complication  GERD has been a trigger for him in the past.    Restarted omeprazole last visit in May 20mg twice daily and finds it has been helpful to reduce his asthma symptoms, though not completely.   Continue symbicort 2 puffs twice daily and discussed recommendation to use it as needed an additional 8 times a day per IRON guidelines   Continue singular 10 mg daily  Follow up with pulmonology      Uncontrolled. It turns out, he never used the symbicort as instructed, only used 1 puff twice daily. Will increase to 2 puffs twice daily and as needed for a total of up to 12 puffs per day by new IRON guidelines. Refills sent today. Starting singulair 10mg daily as well. Recommended follow up with pulmonology.      Much improved on Symbicort.  Has rarely needed to use albuterol.    Was recommended to follow-up with pulmonology in January, but he has not scheduled.  Noted several allergic triggers on his respiratory panel.  I offered allergist referral.  He declined for now, but may consider in the future.  Denies allergic rhinitis symptoms  Last Pulmonology visit was on 10/12/2022.        Mild nonobstructive coronary artery disease - CAC score of 83  Per cardiology note from 9/19/22, they recommended  ASA 81mg daily for secondary prevention of CAD     Hyperlipidemia goal <70  continues atorvastatin 40mg daily. Lipids look good. LDL 57      Tension Headache  Continues nortriptyline 10mg daily which works well for him.  Refilled today     GERD   Restarted omeprazole and has been using 20 mg twice daily due to recurrence of asthma symptoms.  GERD has been a trigger of his  "asthma in the past.  Much improved when he is on omeprazole.     Denies acid reflux symptoms. Doing well on omeprazole. Asthma symptoms improved significantly after starting. He would like to work on dietary changes so that he can stop the omeprazole. Has cut back on caffeine and is cutting back on alcohol.             BMI  Estimated body mass index is 28.13 kg/m  as calculated from the following:    Height as of this encounter: 1.93 m (6' 3.98\").    Weight as of this encounter: 104.8 kg (231 lb).   Weight management plan: diet and exercise    Counseling  Appropriate preventive services were discussed with this patient, including applicable screening as appropriate for fall prevention, nutrition, physical activity, Tobacco-use cessation, weight loss and cognition.  Checklist reviewing preventive services available has been given to the patient.  Reviewed patient's diet, addressing concerns and/or questions.   He is at risk for lack of exercise and has been provided with information to increase physical activity for the benefit of his well-being.   The patient was instructed to see the dentist every 6 months.   He is at risk for psychosocial distress and has been provided with information to reduce risk.          Berkley Payton is a 53 year old, presenting for the following:  Physical        6/27/2024     7:16 AM   Additional Questions   Roomed by Antonio PERSON   Accompanied by self        Health Care Directive  Patient does not have a Health Care Directive or Living Will: Patient states has Advance Directive and will bring in a copy to clinic.    HPI    Two things  One is asthma - questions. Has been taking the prilosec as recommended. Thinks it is helping, but still having chest tightness and not sleeping great. Wonders about going back to allergist.     He likes the preliminary questions. He is much better with asthma that two years ago and much better than last year. Thinks he may just always have some level of " chest tightness or shortness of breath. This summer he notices that the asthma symptoms come on much more in hot muggy weather. 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 Always takes his morning and night inhaler dose as prescribed. On a bad day maybe two or three additional times. On a good day not really at all. Has seen pulm, but did not follow up.    Notes he has a knee work comp case at work. Says he has no cartilage in either knee. Had torn meniscus. At the time they offered PT. Has not really done anything. He is working at the gym now. Trying to do strength training to see if that makes it better. Does not really want to do PT or knee injections. Wonders if he can improve the strength in his knees to mitigate the pain and instability. He feels like work comp is so accusatory so he is reluctant to do it.              6/26/2024   General Health   How would you rate your overall physical health? (!) FAIR   Feel stress (tense, anxious, or unable to sleep) Only a little      (!) STRESS CONCERN      6/26/2024   Nutrition   Three or more servings of calcium each day? Yes   Diet: Other   If other, please elaborate: Plant based low sugar   How many servings of fruit and vegetables per day? 4 or more   How many sweetened beverages each day? 0-1            6/26/2024   Exercise   Days per week of moderate/strenous exercise 3 days   Average minutes spent exercising at this level 40 min            6/26/2024   Social Factors   Frequency of gathering with friends or relatives Three times a week   Worry food won't last until get money to buy more No   Food not last or not have enough money for food? No   Do you have housing? (Housing is defined as stable permanent housing and does not include staying ouside in a car, in a tent, in an abandoned building, in an overnight shelter, or couch-surfing.) Yes   Are you worried about losing your housing? No   Lack of transportation? No  "  Unable to get utilities (heat,electricity)? No            6/27/2024   Fall Risk   Gait Speed Test (Document in seconds) 4.08   Gait Speed Test Interpretation Less than or equal to 5.00 seconds - PASS             6/26/2024   Dental   Dentist two times every year? (!) NO            6/26/2024   TB Screening   Were you born outside of the [place]? No              Today's PHQ-2 Score:       5/21/2024     6:52 AM   PHQ-2 ( 1999 [employer])   Q1: Little interest or pleasure in doing things 0   Q2: Feeling down, depressed or hopeless 1   PHQ-2 Score 1   Q1: Little interest or pleasure in doing things Not at all   Q2: Feeling down, depressed or hopeless Several days   PHQ-2 Score 1         6/26/2024   Substance Use   Alcohol more than 3/day or more than 7/wk No   Do you use any other substances recreationally? (!) ALCOHOL        Social History     Tobacco Use    Smoking status: Never    Smokeless tobacco: Never   Vaping Use    Vaping status: Never Used   Substance Use Topics    Alcohol use: Yes     Comment: usually a couple beers after work daily    Drug use: No           6/26/2024   STI Screening   New sexual partner(s) since last STI/HIV test? No      Last PSA: No results found for: \"PSA\"  ASCVD Risk   The ASCVD Risk score (Ana M NEWBERRY, et al., 2019) failed to calculate for the following reasons:    The valid total cholesterol range is 130 to 320 mg/dL          Reviewed and updated as needed this visit by Provider                    Past Medical History:   Diagnosis Date    Arthritis     Depressive disorder     Heart disease     Hypertension     NO ACTIVE PROBLEMS     Uncomplicated asthma      Past Surgical History:   Procedure Laterality Date    [facility] ECHO HEART XTHORACIC, STRESS/REST  7/2007    Negative            Objective    Exam  /84 (BP Location: Right arm, Patient Position: Sitting, Cuff Size: Adult Regular)   Pulse 63   Temp 97  F (36.1  C) (Temporal)   Resp 18   Ht 1.93 m (6' 3.98\")   Wt 104.8 kg (231 " "lb)   SpO2 97%   BMI 28.13 kg/m     Estimated body mass index is 28.13 kg/m  as calculated from the following:    Height as of this encounter: 1.93 m (6' 3.98\").    Weight as of this encounter: 104.8 kg (231 lb).    Physical Exam  GENERAL: alert and no distress  EYES: Eyes grossly normal to inspection, PERRL and conjunctivae and sclerae normal  HENT: ear canals and TM's normal, nose and mouth without ulcers or lesions  NECK: no adenopathy, no asymmetry, masses, or scars  RESP: lungs clear to auscultation - no rales, rhonchi or wheezes  CV: regular rate and rhythm, normal S1 S2, no S3 or S4, no murmur, click or rub, no peripheral edema  ABDOMEN: soft, nontender, no hepatosplenomegaly, no masses and bowel sounds normal  MS: no gross musculoskeletal defects noted, no edema  SKIN: no suspicious lesions or rashes  NEURO: Normal strength and tone, mentation intact and speech normal  PSYCH: mentation appears normal, affect normal/bright        Signed Electronically by: Mona Szymanski MD     "

## 2024-06-27 NOTE — PATIENT INSTRUCTIONS
"Patient Education   Schedule with pulmonology for your asthma  Look into scheduling PT through work comp   Schedule skin cancer screening. Use sunscreen daily - you can try Native brand sunscreen.   Preventive Care Advice   This is general advice we often give to help people stay healthy. Your care team may have specific advice just for you. Please talk to your care team about your own preventive care needs.  Lifestyle  Exercise at least 150 minutes each week (30 minutes a day, 5 days a week).  Do muscle strengthening activities 2 days a week. These help control your weight and prevent disease.  No smoking.  Wear sunscreen to prevent skin cancer.  Have your home tested for radon every 2 to 5 years. Radon is a colorless, odorless gas that can harm your lungs. To learn more, go to www.health.state.[place] and search for \"Radon in Homes.\"  Keep guns unloaded and locked up in a safe place like a safe or gun vault, or, use a gun lock and hide the keys. Always lock away bullets separately. To learn more, visit [employer].[place].gov and search for \"safe gun storage.\"  Nutrition  Eat 5 or more servings of fruits and vegetables each day.  Try wheat bread, brown rice and whole grain pasta (instead of white bread, rice, and pasta).  Get enough calcium and vitamin D. Check the label on foods and aim for 100% of the RDA (recommended daily allowance).  Regular exams  Have a dental exam and cleaning every 6 months.  See your health care team every year to talk about:  Any changes in your health.  Any medicines your care team has prescribed.  Preventive care, family planning, and ways to prevent chronic diseases.  Shots (vaccines)   HPV shots (up to age 26), if you've never had them before.  Hepatitis B shots (up to age 59), if you've never had them before.  COVID-19 shot: Get this shot when it's due.  Flu shot: Get a flu shot every year.  Tetanus shot: Get a tetanus shot every 10 years.  Pneumococcal, hepatitis A, and RSV shots: Ask your care " team if you need these based on your risk.  Shingles shot (for age 50 and up).  General health tests  Diabetes screening:  Starting at age 35, Get screened for diabetes at least every 3 years.  If you are younger than age 35, ask your care team if you should be screened for diabetes.  Cholesterol test: At age 39, start having a cholesterol test every 5 years, or more often if advised.  Bone density scan (DEXA): At age 50, ask your care team if you should have this scan for osteoporosis (brittle bones).  Hepatitis C: Get tested at least once in your life.  Abdominal aortic aneurysm screening: Talk to your doctor about having this screening if you:  Have ever smoked; and  Are biologically male; and  Are between the ages of 65 and 75.  STIs (sexually transmitted infections)  Before age 24: Ask your care team if you should be screened for STIs.  After age 24: Get screened for STIs if you're at risk. You are at risk for STIs (including HIV) if:  You are sexually active with more than one person.  You don't use condoms every time.  You or a partner was diagnosed with a sexually transmitted infection.  If you are at risk for HIV, ask about PrEP medicine to prevent HIV.  Get tested for HIV at least once in your life, whether you are at risk for HIV or not.  Cancer screening tests  Cervical cancer screening: If you have a cervix, begin getting regular cervical cancer screening tests at age 21. Most people who have regular screenings with normal results can stop after age 65. Talk about this with your provider.  Breast cancer scan (mammogram): If you've ever had breasts, begin having regular mammograms starting at age 40. This is a scan to check for breast cancer.  Colon cancer screening: It is important to start screening for colon cancer at age 45.  Have a colonoscopy test every 10 years (or more often if you're at risk) Or, ask your provider about stool tests like a FIT test every year or Cologuard test every 3 years.  To  learn more about your testing options, visit: www.[employer]/656489.pdf.  For help making a decision, visit: odilia/rc03188.  Prostate cancer screening test: If you have a prostate and are age 55 to 69, ask your provider if you would benefit from a yearly prostate cancer screening test.  Lung cancer screening: If you are a current or former smoker age 50 to 80, ask your care team if ongoing lung cancer screenings are right for you.  For informational purposes only. Not to replace the advice of your health care provider. Copyright   2023 [employer]. All rights reserved. Clinically reviewed by the [facility]. [employer] [id number] - REV 04/24.  Preventing Falls: Care Instructions  Injuries and health problems such as trouble walking or poor eyesight can increase your risk of falling. So can some medicines. But there are things you can do to help prevent falls. You can exercise to get stronger. You can also arrange your home to make it safer.    Talk to your doctor about the medicines you take. Ask if any of them increase the risk of falls and whether they can be changed or stopped.   Try to exercise regularly. It can help improve your strength and balance. This can help lower your risk of falling.     Practice fall safety and prevention.    Wear low-heeled shoes that fit well and give your feet good support. Talk to your doctor if you have foot problems that make this hard.  Carry a cellphone or wear a medical alert device that you can use to call for help.  Use stepladders instead of chairs to reach high objects. Don't climb if you're at risk for falls. Ask for help, if needed.  Wear the correct eyeglasses, if you need them.    Make your home safer.    Remove rugs, cords, clutter, and furniture from walkways.  Keep your house well lit. Use night-lights in hallways and bathrooms.  Install and use sturdy handrails on stairways.  Wear nonskid footwear, even inside. Don't walk  "barefoot or in socks without shoes.    Be safe outside.    Use handrails, curb cuts, and ramps whenever possible.  Keep your hands free by using a shoulder bag or backpack.  Try to walk in well-lit areas. Watch out for uneven ground, changes in pavement, and debris.  Be careful in the winter. Walk on the grass or gravel when sidewalks are slippery. Use de-icer on steps and walkways. Add non-slip devices to shoes.    Put grab bars and nonskid mats in your shower or tub and near the toilet. Try to use a shower chair or bath bench when bathing.   Get into a tub or shower by putting in your weaker leg first. Get out with your strong side first. Have a phone or medical alert device in the bathroom with you.   Where can you learn more?  Go to https://www.[employer]/patiented  Enter G117 in the search box to learn more about \"Preventing Falls: Care Instructions.\"  Current as of: July 17, 2023               Content Version: 14.0    2785-6378 [employer].   Care instructions adapted under license by your healthcare professional. If you have questions about a medical condition or this instruction, always ask your healthcare professional. [employer] disclaims any warranty or liability for your use of this information.      Substance Use Disorder: Care Instructions  Overview     You can improve your life and health by stopping your use of alcohol or drugs. When you don't drink or use drugs, you may feel and sleep better. You may get along better with your family, friends, and coworkers. There are medicines and programs that can help with substance use disorder.  How can you care for yourself at home?  Here are some ways to help you stay sober and prevent relapse.  If you have been given medicine to help keep you sober or reduce your cravings, be sure to take it exactly as prescribed.  Talk to your doctor about programs that can help you stop using drugs or drinking alcohol.  Do not keep alcohol or " drugs in your home.  Plan ahead. Think about what you'll say if other people ask you to drink or use drugs. Try not to spend time with people who drink or use drugs.  Use the time and money spent on drinking or drugs to do something that's important to you.  Preventing a relapse  Have a plan to deal with relapse. Learn to recognize changes in your thinking that lead you to drink or use drugs. Get help before you start to drink or use drugs again.  Try to stay away from situations, friends, or places that may lead you to drink or use drugs.  If you feel the need to drink alcohol or use drugs again, seek help right away. Call a trusted friend or family member. Some people get support from organizations such as Narcotics Anonymous or [employer] or from treatment facilities.  If you relapse, get help as soon as you can. Some people make a plan with another person that outlines what they want that person to do for them if they relapse. The plan usually includes how to handle the relapse and who to notify in case of relapse.  Don't give up. Remember that a relapse doesn't mean that you have failed. Use the experience to learn the triggers that lead you to drink or use drugs. Then quit again. Recovery is a lifelong process. Many people have several relapses before they are able to quit for good.  Follow-up care is a key part of your treatment and safety. Be sure to make and go to all appointments, and call your doctor if you are having problems. It's also a good idea to know your test results and keep a list of the medicines you take.  When should you call for help?   Call 911  anytime you think you may need emergency care. For example, call if you or someone else:    Has overdosed or has withdrawal signs. Be sure to tell the emergency workers that you are or someone else is using or trying to quit using drugs. Overdose or withdrawal signs may include:  Losing consciousness.  Seizure.  Seeing or hearing things that  "aren't there (hallucinations).     Is thinking or talking about suicide or harming others.   Where to get help 24 hours a day, 7 days a week   If you or someone you know talks about suicide, self-harm, a mental health crisis, a substance use crisis, or any other kind of emotional distress, get help right away. You can:    Call the Suicide and Crisis Lifeline at 898.     Call 6-102-580-TALK (1-326.696.2104).     Text HOME to 001567 to access the Crisis Text Line.   Consider saving these numbers in your phone.  Go to [employer] for more information or to chat online.  Call your doctor now or seek immediate medical care if:    You are having withdrawal symptoms. These may include nausea or vomiting, sweating, shakiness, and anxiety.   Watch closely for changes in your health, and be sure to contact your doctor if:    You have a relapse.     You need more help or support to stop.   Where can you learn more?  Go to https://www.[employer].net/patiented  Enter H573 in the search box to learn more about \"Substance Use Disorder: Care Instructions.\"  Current as of: November 15, 2023               Content Version: 14.0    0836-3998 [employer].   Care instructions adapted under license by your healthcare professional. If you have questions about a medical condition or this instruction, always ask your healthcare professional. [employer] disclaims any warranty or liability for your use of this information.         "

## 2024-07-08 ENCOUNTER — TELEPHONE (OUTPATIENT)
Dept: FAMILY MEDICINE | Facility: CLINIC | Age: 54
End: 2024-07-08
Payer: COMMERCIAL

## 2024-07-08 NOTE — TELEPHONE ENCOUNTER
General Call      Reason for Call:  Referral    What are your questions or concerns:  Dermatology consultant called stating they receive the referral for pt but no demographic was attached for pt. Please send pt demographic and referral again to Dermatology consultant at [phone], thanks!    Date of last appointment with provider: 6/27/2024

## 2024-07-09 ENCOUNTER — TELEPHONE (OUTPATIENT)
Dept: PULMONOLOGY | Facility: CLINIC | Age: 54
End: 2024-07-09
Payer: COMMERCIAL

## 2024-07-09 DIAGNOSIS — J45.50 SEVERE PERSISTENT ASTHMA WITHOUT COMPLICATION (H): Primary | ICD-10-CM

## 2024-07-09 NOTE — TELEPHONE ENCOUNTER
Left Voicemail (1st Attempt) for the patient to call back and schedule the following:    Appointment type: CXR  Provider: MIKE  Return date: 10/7/24  Specialty phone number: [phone]  Additional appointment(s) needed: N/A  Additonal Notes: N/A

## 2024-07-11 NOTE — TELEPHONE ENCOUNTER
Left Voicemail (2nd Attempt) for the patient to call back and schedule the following:    Appointment type: CXR  Provider: MIKE  Return date: 10/7/24  Specialty phone number: [phone]  Additional appointment(s) needed: N/A  Additonal Notes: N/A

## 2024-09-30 NOTE — CONFIDENTIAL NOTE
RECORDS RECEIVED FROM: internal    DATE RECEIVED: 10/7/24    NOTES STATUS DETAILS   OFFICE NOTE from referring provider internal    Stephon, Mona HEMPHILL MD      OFFICE NOTE from other specialist internal  10.12.22 Jbeli    MEDICATION LIST internal     IMAGING  (NEED IMAGES AND REPORTS)     CT SCAN internal  10.13.22   CHEST XRAY (CXR) internal  Scheduled 10/7/24     9.20.23, 10.12.22    TESTS     PULMONARY FUNCTION TESTING (PFT) internal  Scheduled 10/7/24

## 2024-10-01 ENCOUNTER — ANCILLARY PROCEDURE (OUTPATIENT)
Dept: GENERAL RADIOLOGY | Facility: CLINIC | Age: 54
End: 2024-10-01
Attending: INTERNAL MEDICINE
Payer: COMMERCIAL

## 2024-10-01 DIAGNOSIS — J45.50 SEVERE PERSISTENT ASTHMA WITHOUT COMPLICATION (H): ICD-10-CM

## 2024-10-01 PROCEDURE — 71046 X-RAY EXAM CHEST 2 VIEWS: CPT | Mod: TC | Performed by: STUDENT IN AN ORGANIZED HEALTH CARE EDUCATION/TRAINING PROGRAM

## 2024-10-06 ASSESSMENT — ASTHMA QUESTIONNAIRES
QUESTION_1 LAST FOUR WEEKS HOW MUCH OF THE TIME DID YOUR ASTHMA KEEP YOU FROM GETTING AS MUCH DONE AT WORK, SCHOOL OR AT HOME: SOME OF THE TIME
QUESTION_2 LAST FOUR WEEKS HOW OFTEN HAVE YOU HAD SHORTNESS OF BREATH: THREE TO SIX TIMES A WEEK
QUESTION_5 LAST FOUR WEEKS HOW WOULD YOU RATE YOUR ASTHMA CONTROL: SOMEWHAT CONTROLLED
QUESTION_3 LAST FOUR WEEKS HOW OFTEN DID YOUR ASTHMA SYMPTOMS (WHEEZING, COUGHING, SHORTNESS OF BREATH, CHEST TIGHTNESS OR PAIN) WAKE YOU UP AT NIGHT OR EARLIER THAN USUAL IN THE MORNING: ONCE A WEEK
ACT_TOTALSCORE: 16
ACT_TOTALSCORE: 16
QUESTION_4 LAST FOUR WEEKS HOW OFTEN HAVE YOU USED YOUR RESCUE INHALER OR NEBULIZER MEDICATION (SUCH AS ALBUTEROL): ONCE A WEEK OR LESS

## 2024-10-07 ENCOUNTER — OFFICE VISIT (OUTPATIENT)
Dept: PULMONOLOGY | Facility: CLINIC | Age: 54
End: 2024-10-07
Attending: FAMILY MEDICINE
Payer: COMMERCIAL

## 2024-10-07 ENCOUNTER — PRE VISIT (OUTPATIENT)
Dept: PULMONOLOGY | Facility: CLINIC | Age: 54
End: 2024-10-07

## 2024-10-07 ENCOUNTER — OFFICE VISIT (OUTPATIENT)
Dept: PULMONOLOGY | Facility: CLINIC | Age: 54
End: 2024-10-07
Payer: COMMERCIAL

## 2024-10-07 VITALS
WEIGHT: 239 LBS | HEIGHT: 76 IN | HEART RATE: 64 BPM | OXYGEN SATURATION: 98 % | BODY MASS INDEX: 29.1 KG/M2 | SYSTOLIC BLOOD PRESSURE: 143 MMHG | DIASTOLIC BLOOD PRESSURE: 87 MMHG

## 2024-10-07 DIAGNOSIS — J45.50 SEVERE PERSISTENT ASTHMA WITHOUT COMPLICATION (H): ICD-10-CM

## 2024-10-07 PROCEDURE — 94726 PLETHYSMOGRAPHY LUNG VOLUMES: CPT | Performed by: INTERNAL MEDICINE

## 2024-10-07 PROCEDURE — 94729 DIFFUSING CAPACITY: CPT | Performed by: INTERNAL MEDICINE

## 2024-10-07 PROCEDURE — 94150 VITAL CAPACITY TEST: CPT | Performed by: INTERNAL MEDICINE

## 2024-10-07 PROCEDURE — 99215 OFFICE O/P EST HI 40 MIN: CPT | Mod: 25 | Performed by: INTERNAL MEDICINE

## 2024-10-07 PROCEDURE — 99213 OFFICE O/P EST LOW 20 MIN: CPT | Performed by: INTERNAL MEDICINE

## 2024-10-07 PROCEDURE — 94375 RESPIRATORY FLOW VOLUME LOOP: CPT | Performed by: INTERNAL MEDICINE

## 2024-10-07 ASSESSMENT — PAIN SCALES - GENERAL: PAINLEVEL: MILD PAIN (2)

## 2024-10-07 NOTE — LETTER
10/7/2024      Fito Frye  [address]  [facility] [address]      Dear Colleague,    Thank you for referring your patient, Fito Frye, to the [facility] [place]. Please see a copy of my visit note below.    Reason for Visit -follow-up of asthma management  Fito Frye is a 53 year old male with previously diagnosed asthma who is here for follow-up.  He was first seen in the pulmonary clinic in October 2022 where he was diagnosed with severe persistent asthma.  He was started on Symbicort twice daily along with albuterol as needed.  Lab workup at that point showed multiple allergen positive in the [place] respiratory allergen panel.  And HRCT was done which was negative for any interstitial lung disease and shows signs of air trapping.  Patient stated that initially, he had good control but this year he had worsening of his asthma symptoms and was using his rescue inhaler a lot of the time.  He ended up seeing his PCP and was noted not to be using his Symbicort appropriately.  He was his dose was increased to Breyna twice daily and was switched to SMART therapy.  Since the switch, he says his symptoms have improved considerably.  He takes his ICS/LABA 2 puffs twice daily regularly and on average has to take 2 more puffs a day to control his symptoms which is very variable.  He describes nocturnal awakening about once a week.  He also states that he was recently started on PPI which has also significantly helped his asthma symptoms as he had reflux initially.  Exposure History:   Tobacco: never smoker  Other inhaled substances (Vaping, hookah. 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  Had an episode 1 month ago, where occasionally he has to go out in the field and had an exposure while working.  Pets: cat and dogs son has a cat at home, which he thinks he at home.  Might be allergic to.  He was  allergic in childhood.  Allergies: multiple seasnal allergies   Hobbies: None relevant  Travel: None recently  Home: No significant exposures  GERD: Improved on PPI  Pulmonary HPI    Initial visit with Dr. Laury LITTLE Melva is a 51 year old male w/ h/o hypertension and family history CAD, who presents for evaluation of chest pain and dyspnea.  Patient reported that he has been having intermittent shortness of breath for the last to 3 years, this has been getting worse over the last few months.  He had occasional intermittent wheezing.  No significant cough or sputum production.  His shortness of breath episodes are self-limited, he did notice some improvement with albuterol inhaler, but that does not resolve the symptoms completely.  He also has nocturnal symptoms on a daily basis.  The patient denied having sinus congestion, no postnasal drip.  He endorsed having significant reflux with heartburn, he uses frequent antacids like Tums.  He denied rash, denied fever, no chills, he had fluctuation in his weight, but no significant unintentional weight loss.  No lymphadenopathy.  He endorsed having some muscle cramps in the proximal muscles including his shoulders and hips that is accompanied by occasional weakness.  No joint pain, no numbness or tingling.  Notably the patient endorsed occasional head, he takes nortriptyline for that, he also endorsed having frequent fogginess and dizziness, he has had significant cardiac work-up which was unrevealing.     Patient is a never smoker.  No excessive EtOH use.  No vaping or recreational drug use.   Patient has 2 pets, a dog and a cat.  He endorsed history for cats and dogs allergy when he was a child, but no history of asthma during his childhood.  Patient lives in an old house that was built in 1925, he had a recent work renovation last year on his kitchen, he did a lot of the work in the kitchen, but he said he wore N95, he does not recall seeing mold or  "asbestos.  Patient works in [employer] business, he does not notice any improvement in his symptoms during the weekend.       The patient was seen and examined by Maico Ray MD  Current Outpatient Medications   Medication Sig Dispense Refill     Acetaminophen (TYLENOL PO)        albuterol (PROAIR HFA/PROVENTIL HFA/VENTOLIN HFA) 108 (90 Base) MCG/ACT inhaler Inhale 1-2 puffs into the lungs every 6 hours as needed for shortness of breath or wheezing 18 g 1     amLODIPine (NORVASC) 5 MG tablet Take 1 tablet (5 mg) by mouth daily 90 tablet 1     aspirin (ASA) 81 MG EC tablet Take 1 tablet (81 mg) by mouth daily 90 tablet 3     atorvastatin (LIPITOR) 40 MG tablet Take 1 tablet (40 mg) by mouth daily 90 tablet 3     BREYNA 160-4.5 MCG/ACT Inhaler INHALE 2 PUFFS BY MOUTH TWICE DAILY PLUS 1-2 PUFFS AS NEEDED MAY USE UP TO 12 PUFFS PER DAY 20.4 g 11     hydrochlorothiazide (HYDRODIURIL) 25 MG tablet Take 1 tablet (25 mg) by mouth daily 90 tablet 3     montelukast (SINGULAIR) 10 MG tablet Take 1 tablet (10 mg) by mouth at bedtime 90 tablet 3     nortriptyline (PAMELOR) 10 MG capsule Take 1 capsule (10 mg) by mouth at bedtime 91 capsule 3     omeprazole (PRILOSEC) 20 MG DR capsule Take 1 capsule (20 mg) by mouth 2 times daily 180 capsule 3     VITAMIN D OR 1 tablet daily       No current facility-administered medications for this visit.     Allergies   Allergen Reactions     No Known Drug Allergy      Seasonal Allergies      Past Medical History:   Diagnosis Date     Arthritis      Depressive disorder      Heart disease      Hypertension      NO ACTIVE PROBLEMS      Uncomplicated asthma          ROS Pulmonary  Dyspnea: No, Cough: No, Chest pain: No, Wheezing: No, Sputum Production: No, Hemoptysis: No  A complete ROS was otherwise negative except as noted in the HPI.  BP (!) 143/87   Pulse 64   Ht 1.93 m (6' 3.98\")   Wt 108.4 kg (239 lb)   SpO2 98%   BMI 29.11 kg/m    Exam:   GENERAL APPEARANCE: Well developed, well " nourished, alert, and in no apparent distress.  EYES: PERRL, EOMI  HENT: Nasal mucosa with no edema and no hyperemia. No nasal polyps.  EARS: Canals clear, TMs normal  MOUTH: Oral mucosa is moist, without any lesions, no tonsillar enlargement, no oropharyngeal exudate.  NECK: supple, no masses, no thyromegaly.  LYMPHATICS: No significant axillary, cervical, or supraclavicular nodes.  RESP: normal percussion, good air flow throughout.  No crackles. No rhonchi. No wheezes.  CV: Normal S1, S2, regular rhythm, normal rate. No murmur.  No rub. No gallop. No LE edema.   ABDOMEN:  Bowel sounds normal, soft, nontender, no HSM or masses.   MS: extremities normal. No clubbing. No cyanosis.  SKIN: no rash on limited exam  NEURO: Mentation intact, speech normal, normal strength and tone, normal gait and stance  PSYCH: mentation appears normal. and affect normal/bright  Results:   Latest Reference Range & Units 10/12/22 16:59 05/30/23 08:46 06/08/23 09:30 09/20/23 14:21 09/20/23 14:24 05/21/24 08:32 06/27/24 08:29 10/01/24 07:16   Sodium 135 - 145 mmol/L  140     140    Potassium 3.4 - 5.3 mmol/L  4.0     4.2    Chloride 98 - 107 mmol/L  103     101    Carbon Dioxide (CO2) 22 - 29 mmol/L  25     28    Urea Nitrogen 6.0 - 20.0 mg/dL  21.2 (H)     22.3 (H)    Creatinine 0.67 - 1.17 mg/dL  0.88     0.90    GFR Estimate >60 mL/min/1.73m2  >90     >90    Calcium 8.6 - 10.0 mg/dL  9.1     9.5    Anion Gap 7 - 15 mmol/L  12     11    Albumin 3.5 - 5.2 g/dL 4.4 4.4     4.6    Protein Total 6.4 - 8.3 g/dL 8.2 6.3 (L)     7.4    Alkaline Phosphatase 40 - 150 U/L 113 90     97    ALT 0 - 70 U/L 54 (H) 32     46    AST 0 - 45 U/L 49 33     32    Albumin Urine mg/g Cr   See Comment    See Comment     Albumin Urine mg/L mg/L  <12.0    <12.0     Aldolase 1.2 - 7.6 U/L 6.1          Bilirubin Direct 0.00 - 0.30 mg/dL <0.20          Bilirubin Total <=1.2 mg/dL 0.7 0.5     0.4    Cholesterol <200 mg/dL 176 147    122     CK Total 39 - 308 U/L 406  (H)          CRP Inflammation <5.00 mg/L <3.00          Creatinine Urine mg/dL  166.0    92.9     Patient Fasting?       No     Glucose 70 - 99 mg/dL  90     68 (L)    HDL Cholesterol >=40 mg/dL 57 54    57     IGE 0 - 114 kU/L  0 - 114 kU/L 38  37          LDL Cholesterol Calculated <=100 mg/dL 85 71    56     Non HDL Cholesterol <130 mg/dL 119 93    65     Prostate Specific Antigen Screen 0.00 - 3.50 ng/mL       0.60    Triglycerides <150 mg/dL 170 (H) 108    44     Allergen A alternata <0.10 KU(A)/L <0.10          Allergen A fumigatus <0.10 KU(A)/L <0.10          Allergen, [place] Grass <0.10 KU(A)/L 0.21 (H)          Allergen C herbarum <0.10 KU(A)/L <0.10          Allergen Cat Dander <0.10 KU(A)/L 0.37 (H)          Allergen Cockroach <0.10 KU(A)/L 0.12 (H)          Allergen D farinae <0.10 KU(A)/L 3.71 (H)          Allergen, D Pteronyssinus <0.10 KU(A)/L 2.95 (H)          Allergen Dog Dander <0.10 KU(A)/L 0.13 (H)          Allergen Elm <0.10 KU(A)/L 0.14 (H)          Allergen Maple <0.10 KU(A)/L 0.19 (H)          Allergen, Mtn Cedar <0.10 KU(A)/L 0.13 (H)          Allergen Oak(white) <0.10 KU(A)/L 0.17 (H)          Allergen P notatum <0.10 KU(A)/L <0.10          Allergen Blair <0.10 KU(A)/L 1.28 (H)          Allergen Tree White [place] IgE <0.10 KU(A)/L <0.10          Allergen Weed Nettle IgE <0.10 KU(A)/L 0.14 (H)          Allergen Peralta <0.10 KU(A)/L 0.15 (H)          Allergen Marshelder <0.10 KU(A)/L 0.13 (H)          Allergen, Mouse Urine <0.10 KU(A)/L <0.10          Allergen, Ragweed Short <0.10 KU(A)/L 0.41 (H)          Allergen Russian Thistle <0.10 KU(A)/L 1.32 (H)          Allergen, Silver Birch <0.10 KU(A)/L 0.18 (H)          Allergen White Aniceto <0.10 KU(A)/L 0.76 (H)          Aspergillus Fumagatis 1 Antibody None Detected  None Detected          Aspergillus Fumagatis 6 Antibody None Detected  None Detected          Aureo Pullulans None Detected  None Detected          [place] serum None  Detected  None Detected          Micropolyspora Faeni None Detected  None Detected          WBC 4.0 - 11.0 10e3/uL 8.2    6.5      Hemoglobin 13.3 - 17.7 g/dL 15.8    15.5      Hematocrit 40.0 - 53.0 % 46.7    46.7      Platelet Count 150 - 450 10e3/uL 294    297      RBC Count 4.40 - 5.90 10e6/uL 5.37    5.26      MCV 78 - 100 fL 87    89      MCH 26.5 - 33.0 pg 29.4    29.5      MCHC 31.5 - 36.5 g/dL 33.8    33.2      RDW 10.0 - 15.0 % 11.8    11.9      % Neutrophils % 68          % Lymphocytes % 22          % Monocytes % 9          % Eosinophils % 1          % Basophils % 0          Absolute Basophils 0.0 - 0.2 10e3/uL 0.0          Absolute Eosinophils 0.0 - 0.7 10e3/uL 0.1          Absolute Immature Granulocytes <=0.4 10e3/uL 0.0          Absolute Lymphocytes 0.8 - 5.3 10e3/uL 1.8          Absolute Monocytes 0.0 - 1.3 10e3/uL 0.8          % Immature Granulocytes % 0          Absolute Neutrophils 1.6 - 8.3 10e3/uL 5.5          Absolute NRBCs 10e3/uL 0.0          NRBCs per 100 WBC <1 /100 0          Sed Rate 0 - 20 mm/hr 7          Thermoact Vulgaris 1 None Detected  See Note          Hep B Surface Agn Nonreactive        Nonreactive    Hepatitis B Core Cinthya Nonreactive        Nonreactive    Hepatitis B Surface Antibody Instrument Value <8.5 m[IU]/mL       338.00    Hepatitis B Surface Antibody        Reactive    Hepatitis C Antibody Nonreactive  Nonreactive          HIV Antigen Antibody Combo Nonreactive  Nonreactive          ANTI NUCLEAR CINTHYA IGG BY IFA WITH REFLEX  Rpt          HYPERSENSITIVITY PNEUMONITIS 2  Rpt          Whit 1 Antibody IgG Negative  Negative          Neutrophil Cytoplasmic Antibody <1:10  <1:10          Neutrophil Cytoplasmic Antibody Pattern  The ANCA IFA is <1:10.  No further testing will be performed.          COLOGUARD(EXACT SCIENCES)    Rpt        XR CHEST 2 VIEWS     Rpt    Rpt   (H): Data is abnormally high  (L): Data is abnormally low  Rpt: View report in Results Review for more  information                  Assessment and plan:   53-year-old male with severe persistent asthma who presents to the pulmonary clinic for follow-up.    1.  Severe persistent asthma  2.  Multiple allergies.    - Currently, asthma control seems to be improving over the last 1 to 2 months with improved use of his inhalers, starting PPI and avoiding other triggers.  - We discussed the importance of improving trigger exposures for better control.  She will try to work on avoiding the cat exposure which she is allergic to.  Continue PPI for his GERD.  - Continue ICS/LABA to be used twice along with as needed as SMART therapy.  - Continue Singulair.  - Will send out a referral to allergist.  Previous allergy panel was positive for multiple allergens.  No need to repeat the test right now.  - We discussed about an asthma action plan which will be included in the patient's discharge instructions.  A peak flow meter was given to the patient with instructions as well.    Follow-up in 12 months.    I personally spent 52 minutes on the date of the encounter doing chart review, history and exam, documentation and further activities per the note.     CHIP Gonzalez   of Medicine  [facility]  Pulmonary, Allergy, Critical Care and Sleep Medicine  Pager - [phone]                Again, thank you for allowing me to participate in the care of your patient.        Sincerely,        Maico Ray MD

## 2024-10-07 NOTE — NURSING NOTE
Chief Complaint   Patient presents with    Consult     New Asthma     Medications reviewed and vital signs taken.   Pennie Higgins CMA

## 2024-10-07 NOTE — PATIENT INSTRUCTIONS
My Asthma Home Management Plan of Care  Name: Fito Frye   YOB: 1970  Date: 10/7/2024   My doctor: Mona Szymanski   My clinic: [facility] LUNG SCIENCE AND [facility] [address] [id number] [phone]   Control Medicine (dose,route freq.): Budesonide-Formoterol 160-4.5 twice daily  Reliever/Rescue Medicine: Albuterol (Proair/Ventolin/Proventil HFA) 2-4 puffs EVERY 4 HOURS as needed. Use a spacer if recommended by your provider.  Budesonide-Formoterol 160-4.5 upto 8 extra puffs as needed per day  Oral Steroid Medicine: None  My Asthma Severity: Severe Persistent  Avoid your asthma triggers: upper respiratory infections, dust mites, animal dander, insects/rodents, humidity, occupational exposure, and Gastric Reflux        GREEN   ZONE   Good Control  I feel good  No cough or wheeze  Can work, sleep and play without asthma symptoms       Take your asthma control medicine every day.    If exercise triggers your asthma, take Albuterol (Proair/Ventolin/Proventil HFA) 2-4 puffs EVERY 4 HOURS as needed. Use a spacer if recommended by your provider.  15 minutes before exercise or sports, and  During exercise if you have asthma symptoms  Spacer to use with inhaler: No             YELLOW   ZONE Getting Worse  I have ANY of these:  I do not feel good  Cough or wheeze  Chest feels tight  Wake up at night       Keep taking your Green Zone medications  Start taking your rescue medicine:  every 20 minutes for up to 1 hour. Then every 4 hours for 24-48 hours.  If you do not return to the Green Zone in 12-24 hours or you get worse, start taking your oral steroid medicine as prescribed by your provider.  If you stay in the Yellow Zone for more than 12-24 hours, contact your doctor.           RED ZONE Medical Alert - Get Help  I have ANY of these:  I feel awful  Medicine is not helping  Breathing getting harder  Trouble walking or talking  Nose opens  wide to breathe       Take your rescue medicine NOW  If your provider has prescribed an oral steroid medicine, start taking it NOW  Call your doctor NOW  If you are still in the Red Zone after 20 minutes and you have not reached your doctor:  Take your rescue medicine again and  Call 911 or go to the emergency room right away    See your regular doctor within 7-10 days of an Emergency Room or Urgent Care visit for follow-up treatment.        Electronically signed by: Maico Ray MD, October 7, 2024  Annual Reminders:  Flu Shot in the Fall  Pharmacy:    [id number] - [place], [place] - [address] AT [facility] & [address] [place] [address] DR[facility] STORE #[id number] - [place], [place] - [address] AT [address] [place] [address] & [facility]      Asthma Triggers  How To Control Things That Make Your Asthma Worse    Triggers are things that make your asthma worse.  Look at the list below to help you find your triggers and  what you can do about them.  You can help prevent asthma flare-ups by staying away from your triggers.      Trigger                                                          What you can do   Cigarette Smoke  Tobacco smoke can make asthma worse. Do not allow smoking in your home, car or around you.  Be sure no one smokes at a child s day care or school.  If you smoke, ask your health care provider for ways to help you quick.  Ask family members to quit too.  Ask your health care provider for a referral to Quit plan to help you quit smoking, or call 3-786-663-PLAN.     Colds, Flu, Bronchitis  These are common triggers of asthma. Wash your hands often.  Don t touch your eyes, nose or mouth.  Get a flu shot every year.     Dust Mites  These are tiny bugs that live in cloth or carpet. They are too small to see. Wash sheets and blankets in hot water every week.   Encase pillows and mattress in dust mite proof covers.  Avoid having carpet if you can. If you have carpet, vacuum weekly.   Use a dust  mask and HEPA vacuum.   Pollen and Outdoor Mold  Some people are allergic to trees, grass, or weed pollen, or molds. Try to keep your windows closed.  Limit time out doors when pollen count is high.   Ask you health care provider about taking medicine during allergy season.     Animal Dander  Some people are allergic to skin flakes, urine or saliva from pets with fur or feathers. Keep pets with fur or feathers out of your home.    If you can t keep the pet outdoors, then keep the pet out of your bedroom.  Keep the bedroom door closed.  Keep pets off cloth furniture and away from stuffed toys.     Mice, Rats, and Cockroaches  Some people are allergic to the waste from these pets.   Cover food and garbage.  Clean up spills and food crumbs.  Store grease in the refrigerator.   Keep food out of the bedroom.   Indoor Mold  This can be a trigger if your home has high moisture Fix leaking faucets, pipes, or other sources of water.   Clean moldy surfaces.  Dehumidify basement if it is damp and smelly.   Smoke, Strong Odors, and Sprays  These can reduce air quality. Stay away from strong odors and sprays, such as perfume, powder, hair spray, paints, smoke incense, paints, cleaning products, candles and new carpet.   Exercise or Sports  Some people with asthma have this trigger. Be active!  Ask you doctor about taking medicine before sports or exercise to prevent symptoms.    Warm up for 5-10 minutes before and after sports or exercise.     Other Triggers of Asthma  Cold air:  Cover your nose and mouth with a scarf.  Sometimes laughing or crying can be a trigger.  Some medicines and food can trigger asthma.

## 2024-10-07 NOTE — PROGRESS NOTES
Reason for Visit -follow-up of asthma management  Fito Frye is a 53 year old male with previously diagnosed asthma who is here for follow-up.  He was first seen in the pulmonary clinic in October 2022 where he was diagnosed with severe persistent asthma.  He was started on Symbicort twice daily along with albuterol as needed.  Lab workup at that point showed multiple allergen positive in the [place] respiratory allergen panel.  And HRCT was done which was negative for any interstitial lung disease and shows signs of air trapping.  Patient stated that initially, he had good control but this year he had worsening of his asthma symptoms and was using his rescue inhaler a lot of the time.  He ended up seeing his PCP and was noted not to be using his Symbicort appropriately.  He was his dose was increased to Breyna twice daily and was switched to SMART therapy.  Since the switch, he says his symptoms have improved considerably.  He takes his ICS/LABA 2 puffs twice daily regularly and on average has to take 2 more puffs a day to control his symptoms which is very variable.  He describes nocturnal awakening about once a week.  He also states that he was recently started on PPI which has also significantly helped his asthma symptoms as he had reflux initially.  Exposure History:   Tobacco: never smoker  Other inhaled substances (Vaping, hookah. 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  Had an episode 1 month ago, where occasionally he has to go out in the field and had an exposure while working.  Pets: cat and dogs son has a cat at home, which he thinks he at home.  Might be allergic to.  He was allergic in childhood.  Allergies: multiple seasnal allergies   Hobbies: None relevant  Travel: None recently  Home: No significant exposures  GERD: Improved on PPI  Pulmonary HPI    Initial visit with Dr. Laury Frye is a 51 year old male w/ h/o hypertension and family  history CAD, who presents for evaluation of chest pain and dyspnea.  Patient reported that he has been having intermittent shortness of breath for the last to 3 years, this has been getting worse over the last few months.  He had occasional intermittent wheezing.  No significant cough or sputum production.  His shortness of breath episodes are self-limited, he did notice some improvement with albuterol inhaler, but that does not resolve the symptoms completely.  He also has nocturnal symptoms on a daily basis.  The patient denied having sinus congestion, no postnasal drip.  He endorsed having significant reflux with heartburn, he uses frequent antacids like Tums.  He denied rash, denied fever, no chills, he had fluctuation in his weight, but no significant unintentional weight loss.  No lymphadenopathy.  He endorsed having some muscle cramps in the proximal muscles including his shoulders and hips that is accompanied by occasional weakness.  No joint pain, no numbness or tingling.  Notably the patient endorsed occasional head, he takes nortriptyline for that, he also endorsed having frequent fogginess and dizziness, he has had significant cardiac work-up which was unrevealing.     Patient is a never smoker.  No excessive EtOH use.  No vaping or recreational drug use.   Patient has 2 pets, a dog and a cat.  He endorsed history for cats and dogs allergy when he was a child, but no history of asthma during his childhood.  Patient lives in an old house that was built in 1925, he had a recent work renovation last year on his kitchen, he did a lot of the work in the kitchen, but he said he wore N95, he does not recall seeing mold or asbestos.  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       The patient was seen and examined by Maico Ray MD  Current Outpatient Medications   Medication Sig Dispense Refill    Acetaminophen (TYLENOL PO)       albuterol  "(PROAIR HFA/PROVENTIL HFA/VENTOLIN HFA) 108 (90 Base) MCG/ACT inhaler Inhale 1-2 puffs into the lungs every 6 hours as needed for shortness of breath or wheezing 18 g 1    amLODIPine (NORVASC) 5 MG tablet Take 1 tablet (5 mg) by mouth daily 90 tablet 1    aspirin (ASA) 81 MG EC tablet Take 1 tablet (81 mg) by mouth daily 90 tablet 3    atorvastatin (LIPITOR) 40 MG tablet Take 1 tablet (40 mg) by mouth daily 90 tablet 3    BREYNA 160-4.5 MCG/ACT Inhaler INHALE 2 PUFFS BY MOUTH TWICE DAILY PLUS 1-2 PUFFS AS NEEDED MAY USE UP TO 12 PUFFS PER DAY 20.4 g 11    hydrochlorothiazide (HYDRODIURIL) 25 MG tablet Take 1 tablet (25 mg) by mouth daily 90 tablet 3    montelukast (SINGULAIR) 10 MG tablet Take 1 tablet (10 mg) by mouth at bedtime 90 tablet 3    nortriptyline (PAMELOR) 10 MG capsule Take 1 capsule (10 mg) by mouth at bedtime 91 capsule 3    omeprazole (PRILOSEC) 20 MG DR capsule Take 1 capsule (20 mg) by mouth 2 times daily 180 capsule 3    VITAMIN D OR 1 tablet daily       No current facility-administered medications for this visit.     Allergies   Allergen Reactions    No Known Drug Allergy     Seasonal Allergies      Past Medical History:   Diagnosis Date    Arthritis     Depressive disorder     Heart disease     Hypertension     NO ACTIVE PROBLEMS     Uncomplicated asthma          ROS Pulmonary  Dyspnea: No, Cough: No, Chest pain: No, Wheezing: No, Sputum Production: No, Hemoptysis: No  A complete ROS was otherwise negative except as noted in the HPI.  BP (!) 143/87   Pulse 64   Ht 1.93 m (6' 3.98\")   Wt 108.4 kg (239 lb)   SpO2 98%   BMI 29.11 kg/m    Exam:   GENERAL APPEARANCE: Well developed, well nourished, alert, and in no apparent distress.  EYES: PERRL, EOMI  HENT: Nasal mucosa with no edema and no hyperemia. No nasal polyps.  EARS: Canals clear, TMs normal  MOUTH: Oral mucosa is moist, without any lesions, no tonsillar enlargement, no oropharyngeal exudate.  NECK: supple, no masses, no " thyromegaly.  LYMPHATICS: No significant axillary, cervical, or supraclavicular nodes.  RESP: normal percussion, good air flow throughout.  No crackles. No rhonchi. No wheezes.  CV: Normal S1, S2, regular rhythm, normal rate. No murmur.  No rub. No gallop. No LE edema.   ABDOMEN:  Bowel sounds normal, soft, nontender, no HSM or masses.   MS: extremities normal. No clubbing. No cyanosis.  SKIN: no rash on limited exam  NEURO: Mentation intact, speech normal, normal strength and tone, normal gait and stance  PSYCH: mentation appears normal. and affect normal/bright  Results:   Latest Reference Range & Units 10/12/22 16:59 05/30/23 08:46 06/08/23 09:30 09/20/23 14:21 09/20/23 14:24 05/21/24 08:32 06/27/24 08:29 10/01/24 07:16   Sodium 135 - 145 mmol/L  140     140    Potassium 3.4 - 5.3 mmol/L  4.0     4.2    Chloride 98 - 107 mmol/L  103     101    Carbon Dioxide (CO2) 22 - 29 mmol/L  25     28    Urea Nitrogen 6.0 - 20.0 mg/dL  21.2 (H)     22.3 (H)    Creatinine 0.67 - 1.17 mg/dL  0.88     0.90    GFR Estimate >60 mL/min/1.73m2  >90     >90    Calcium 8.6 - 10.0 mg/dL  9.1     9.5    Anion Gap 7 - 15 mmol/L  12     11    Albumin 3.5 - 5.2 g/dL 4.4 4.4     4.6    Protein Total 6.4 - 8.3 g/dL 8.2 6.3 (L)     7.4    Alkaline Phosphatase 40 - 150 U/L 113 90     97    ALT 0 - 70 U/L 54 (H) 32     46    AST 0 - 45 U/L 49 33     32    Albumin Urine mg/g Cr   See Comment    See Comment     Albumin Urine mg/L mg/L  <12.0    <12.0     Aldolase 1.2 - 7.6 U/L 6.1          Bilirubin Direct 0.00 - 0.30 mg/dL <0.20          Bilirubin Total <=1.2 mg/dL 0.7 0.5     0.4    Cholesterol <200 mg/dL 176 147    122     CK Total 39 - 308 U/L 406 (H)          CRP Inflammation <5.00 mg/L <3.00          Creatinine Urine mg/dL  166.0    92.9     Patient Fasting?       No     Glucose 70 - 99 mg/dL  90     68 (L)    HDL Cholesterol >=40 mg/dL 57 54    57     IGE 0 - 114 kU/L  0 - 114 kU/L 38  37          LDL Cholesterol Calculated <=100 mg/dL 85  71    56     Non HDL Cholesterol <130 mg/dL 119 93    65     Prostate Specific Antigen Screen 0.00 - 3.50 ng/mL       0.60    Triglycerides <150 mg/dL 170 (H) 108    44     Allergen A alternata <0.10 KU(A)/L <0.10          Allergen A fumigatus <0.10 KU(A)/L <0.10          Allergen, [place] Grass <0.10 KU(A)/L 0.21 (H)          Allergen C herbarum <0.10 KU(A)/L <0.10          Allergen Cat Dander <0.10 KU(A)/L 0.37 (H)          Allergen Cockroach <0.10 KU(A)/L 0.12 (H)          Allergen D farinae <0.10 KU(A)/L 3.71 (H)          Allergen, D Pteronyssinus <0.10 KU(A)/L 2.95 (H)          Allergen Dog Dander <0.10 KU(A)/L 0.13 (H)          Allergen Elm <0.10 KU(A)/L 0.14 (H)          Allergen Maple <0.10 KU(A)/L 0.19 (H)          Allergen, Mtn Cedar <0.10 KU(A)/L 0.13 (H)          Allergen Oak(white) <0.10 KU(A)/L 0.17 (H)          Allergen P notatum <0.10 KU(A)/L <0.10          Allergen Blair <0.10 KU(A)/L 1.28 (H)          Allergen Tree White [place] IgE <0.10 KU(A)/L <0.10          Allergen Weed Nettle IgE <0.10 KU(A)/L 0.14 (H)          Allergen Slope <0.10 KU(A)/L 0.15 (H)          Allergen Marshelder <0.10 KU(A)/L 0.13 (H)          Allergen, Mouse Urine <0.10 KU(A)/L <0.10          Allergen, Ragweed Short <0.10 KU(A)/L 0.41 (H)          Allergen Russian Thistle <0.10 KU(A)/L 1.32 (H)          Allergen, Silver Birch <0.10 KU(A)/L 0.18 (H)          Allergen White Aniceto <0.10 KU(A)/L 0.76 (H)          Aspergillus Fumagatis 1 Antibody None Detected  None Detected          Aspergillus Fumagatis 6 Antibody None Detected  None Detected          Aureo Pullulans None Detected  None Detected          [place] serum None Detected  None Detected          Micropolyspora Faeni None Detected  None Detected          WBC 4.0 - 11.0 10e3/uL 8.2    6.5      Hemoglobin 13.3 - 17.7 g/dL 15.8    15.5      Hematocrit 40.0 - 53.0 % 46.7    46.7      Platelet Count 150 - 450 10e3/uL 294    297      RBC Count 4.40 - 5.90 10e6/uL 5.37     5.26      MCV 78 - 100 fL 87    89      MCH 26.5 - 33.0 pg 29.4    29.5      MCHC 31.5 - 36.5 g/dL 33.8    33.2      RDW 10.0 - 15.0 % 11.8    11.9      % Neutrophils % 68          % Lymphocytes % 22          % Monocytes % 9          % Eosinophils % 1          % Basophils % 0          Absolute Basophils 0.0 - 0.2 10e3/uL 0.0          Absolute Eosinophils 0.0 - 0.7 10e3/uL 0.1          Absolute Immature Granulocytes <=0.4 10e3/uL 0.0          Absolute Lymphocytes 0.8 - 5.3 10e3/uL 1.8          Absolute Monocytes 0.0 - 1.3 10e3/uL 0.8          % Immature Granulocytes % 0          Absolute Neutrophils 1.6 - 8.3 10e3/uL 5.5          Absolute NRBCs 10e3/uL 0.0          NRBCs per 100 WBC <1 /100 0          Sed Rate 0 - 20 mm/hr 7          Thermoact Vulgaris 1 None Detected  See Note          Hep B Surface Agn Nonreactive        Nonreactive    Hepatitis B Core Cinthya Nonreactive        Nonreactive    Hepatitis B Surface Antibody Instrument Value <8.5 m[IU]/mL       338.00    Hepatitis B Surface Antibody        Reactive    Hepatitis C Antibody Nonreactive  Nonreactive          HIV Antigen Antibody Combo Nonreactive  Nonreactive          ANTI NUCLEAR CINTHYA IGG BY IFA WITH REFLEX  Rpt          HYPERSENSITIVITY PNEUMONITIS 2  Rpt          Whit 1 Antibody IgG Negative  Negative          Neutrophil Cytoplasmic Antibody <1:10  <1:10          Neutrophil Cytoplasmic Antibody Pattern  The ANCA IFA is <1:10.  No further testing will be performed.          COLOGUARD(EXACT SCIENCES)    Rpt        XR CHEST 2 VIEWS     Rpt    Rpt   (H): Data is abnormally high  (L): Data is abnormally low  Rpt: View report in Results Review for more information                  Assessment and plan:   53-year-old male with severe persistent asthma who presents to the pulmonary clinic for follow-up.    1.  Severe persistent asthma  2.  Multiple allergies.    - Currently, asthma control seems to be improving over the last 1 to 2 months with improved use of his  inhalers, starting PPI and avoiding other triggers.  - We discussed the importance of improving trigger exposures for better control.  She will try to work on avoiding the cat exposure which she is allergic to.  Continue PPI for his GERD.  - Continue ICS/LABA to be used twice along with as needed as SMART therapy.  - Continue Singulair.  - Will send out a referral to allergist.  Previous allergy panel was positive for multiple allergens.  No need to repeat the test right now.  - We discussed about an asthma action plan which will be included in the patient's discharge instructions.  A peak flow meter was given to the patient with instructions as well.    Follow-up in 12 months.    I personally spent 52 minutes on the date of the encounter doing chart review, history and exam, documentation and further activities per the note.     CHIP Gonzalez   of Medicine  [facility]  Pulmonary, Allergy, Critical Care and Sleep Medicine  Pager - [phone]

## 2024-10-08 LAB
DLCOUNC-%PRED-PRE: 122 %
DLCOUNC-PRE: 41.1 ML/MIN/MMHG
DLCOUNC-PRED: 33.43 ML/MIN/MMHG
ERV-%PRED-PRE: 37 %
ERV-PRE: 0.73 L
ERV-PRED: 1.96 L
EXPTIME-PRE: 6.27 SEC
FEF2575-%PRED-PRE: 95 %
FEF2575-PRE: 3.57 L/SEC
FEF2575-PRED: 3.72 L/SEC
FEFMAX-%PRED-PRE: 72 %
FEFMAX-PRE: 7.9 L/SEC
FEFMAX-PRED: 10.92 L/SEC
FEV1-%PRED-PRE: 80 %
FEV1-PRE: 3.62 L
FEV1FEV6-PRE: 81 %
FEV1FEV6-PRED: 80 %
FEV1FVC-PRE: 81 %
FEV1FVC-PRED: 77 %
FEV1SVC-PRE: 84 %
FEV1SVC-PRED: 86 %
FIFMAX-PRE: 5.42 L/SEC
FRCPLETH-%PRED-PRE: 88 %
FRCPLETH-PRE: 3.49 L
FRCPLETH-PRED: 3.93 L
FVC-%PRED-PRE: 76 %
FVC-PRE: 4.48 L
FVC-PRED: 5.85 L
IC-%PRED-PRE: 90 %
IC-PRE: 3.57 L
IC-PRED: 3.93 L
RVPLETH-%PRED-PRE: 109 %
RVPLETH-PRE: 2.76 L
RVPLETH-PRED: 2.53 L
TLCPLETH-%PRED-PRE: 84 %
TLCPLETH-PRE: 7.06 L
TLCPLETH-PRED: 8.34 L
VA-%PRED-PRE: 79 %
VA-PRE: 6.39 L
VC-%PRED-PRE: 82 %
VC-PRE: 4.3 L
VC-PRED: 5.24 L

## 2024-11-20 ASSESSMENT — ASTHMA QUESTIONNAIRES
QUESTION_4 LAST FOUR WEEKS HOW OFTEN HAVE YOU USED YOUR RESCUE INHALER OR NEBULIZER MEDICATION (SUCH AS ALBUTEROL): THREE OR MORE TIMES PER DAY
QUESTION_2 LAST FOUR WEEKS HOW OFTEN HAVE YOU HAD SHORTNESS OF BREATH: MORE THAN ONCE A DAY
QUESTION_3 LAST FOUR WEEKS HOW OFTEN DID YOUR ASTHMA SYMPTOMS (WHEEZING, COUGHING, SHORTNESS OF BREATH, CHEST TIGHTNESS OR PAIN) WAKE YOU UP AT NIGHT OR EARLIER THAN USUAL IN THE MORNING: FOUR OR MORE NIGHTS A WEEK
ACT_TOTALSCORE: 8
QUESTION_5 LAST FOUR WEEKS HOW WOULD YOU RATE YOUR ASTHMA CONTROL: POORLY CONTROLLED
ACT_TOTALSCORE: 8
QUESTION_1 LAST FOUR WEEKS HOW MUCH OF THE TIME DID YOUR ASTHMA KEEP YOU FROM GETTING AS MUCH DONE AT WORK, SCHOOL OR AT HOME: SOME OF THE TIME

## 2024-11-21 ENCOUNTER — OFFICE VISIT (OUTPATIENT)
Dept: ALLERGY | Facility: CLINIC | Age: 54
End: 2024-11-21
Attending: INTERNAL MEDICINE
Payer: COMMERCIAL

## 2024-11-21 VITALS
DIASTOLIC BLOOD PRESSURE: 84 MMHG | BODY MASS INDEX: 30.39 KG/M2 | OXYGEN SATURATION: 100 % | SYSTOLIC BLOOD PRESSURE: 125 MMHG | HEART RATE: 72 BPM | WEIGHT: 249.5 LBS

## 2024-11-21 DIAGNOSIS — J45.50 SEVERE PERSISTENT ASTHMA WITHOUT COMPLICATION (H): ICD-10-CM

## 2024-11-21 DIAGNOSIS — J30.1 SEASONAL ALLERGIC RHINITIS DUE TO POLLEN: Primary | ICD-10-CM

## 2024-11-21 DIAGNOSIS — K21.00 GASTROESOPHAGEAL REFLUX DISEASE WITH ESOPHAGITIS, UNSPECIFIED WHETHER HEMORRHAGE: ICD-10-CM

## 2024-11-21 RX ORDER — PREDNISONE 20 MG/1
TABLET ORAL
Qty: 10 TABLET | Refills: 0 | Status: SHIPPED | OUTPATIENT
Start: 2024-11-21

## 2024-11-21 NOTE — LETTER
11/21/2024      Fito Frye  [address]  [place] [place] [address]      Dear Colleague,    Thank you for referring your patient, Fito Frye, to the [facility] [place]. Please see a copy of my visit note below.    Fito Frye was seen in the Allergy Clinic at [facility].    Fito Frye is a 53 year old male being seen today at the request of Maico Ray MD/[facility] in consultation for Severe persistent asthma without complication.    He has seen cardiology a few years ago for shortness of breath which was determined to be non-cardiac cause per patient.  He was referred by Pulmonology due to asthma and seasonal allergies.     Currently taking Symbicort 2 puffs twice daily, and 1-2 more time as needed during the day the last 2 weeks.    He was started on omeprazole, which he tells me he did not have a clear cut history of GERD.  He feels his asthma may have improved with omeprazole.  He has not seen GI.    A chest CT scan was normal.    He has increased shortness of breath, lightheadness, and sleep difficulty.    He feels like his asthma is about the worst it has been in a long time.  He was diagnosed a few years ago with asthma and currently using Symbicort and Singulair and uncertain if the Singulair is providing much benefit.  He is also uncertain if the omeprazole is providing much benefit at 20 mg twice daily.    He is having chest burning sensation and chest tightness with shortness of breath without significant wheezing.  This has been increasing over the last 1 to 2 weeks.  Unknown trigger for his symptoms such as infections recently.    He does not have any significant sneezing or eye watering or nasal congestion consistent with environmental allergies.  However he did have blood test in 2022 that were positive to dust mites, grass, weeds and trees and minimally to cat and dog.      Latest Reference Range & Units 10/12/22 16:59   Allergen A alternata <0.10 KU(A)/L <0.10   Allergen A fumigatus <0.10 KU(A)/L <0.10   Allergen, [place] Grass <0.10 KU(A)/L 0.21 (H)   Allergen C herbarum <0.10 KU(A)/L <0.10   Allergen Cat Dander <0.10 KU(A)/L 0.37 (H)   Allergen Cockroach <0.10 KU(A)/L 0.12 (H)   Allergen D farinae <0.10 KU(A)/L 3.71 (H)   Allergen, D Pteronyssinus <0.10 KU(A)/L 2.95 (H)   Allergen Dog Dander <0.10 KU(A)/L 0.13 (H)   Allergen Elm <0.10 KU(A)/L 0.14 (H)   Allergen Maple <0.10 KU(A)/L 0.19 (H)   Allergen, Mtn Cedar <0.10 KU(A)/L 0.13 (H)   Allergen Oak(white) <0.10 KU(A)/L 0.17 (H)   Allergen P notatum <0.10 KU(A)/L <0.10   Allergen Blair <0.10 KU(A)/L 1.28 (H)   Allergen Tree White [place] IgE <0.10 KU(A)/L <0.10   Allergen Weed Nettle IgE <0.10 KU(A)/L 0.14 (H)   Allergen Stringer <0.10 KU(A)/L 0.15 (H)   Allergen Marshelder <0.10 KU(A)/L 0.13 (H)   Allergen, Mouse Urine <0.10 KU(A)/L <0.10   Allergen, Ragweed Short <0.10 KU(A)/L 0.41 (H)   Allergen Russian Thistle <0.10 KU(A)/L 1.32 (H)   Allergen, Silver Birch <0.10 KU(A)/L 0.18 (H)   Allergen White Aniceto <0.10 KU(A)/L 0.76 (H)   Aspergillus Fumagatis 1 Antibody None Detected  None Detected   Aspergillus Fumagatis 6 Antibody None Detected  None Detected   Aureo Pullulans None Detected  None Detected   [place] serum None Detected  None Detected   Micropolyspora Faeni None Detected  None Detected   IGE 0 - 114 kU/L  0 - 114 kU/L 38  37   (H): Data is abnormally high  Past Medical History:   Diagnosis Date     Arthritis      Depressive disorder      Heart disease      Hypertension      NO ACTIVE PROBLEMS      Uncomplicated asthma      Family History   Problem Relation Age of Onset     Myocardial Infarction Mother 70         of MI     Breast Cancer Mother 50        60s or 50s     Coronary Artery Disease Father 65        in his 60s     Diabetes Type 2  Father      Sleep Apnea Father         diagnosed in mid 70's     Multiple  myeloma Father 78     No Known Problems Sister      Myocardial Infarction Brother 55        no blockages     Prostate Cancer Maternal Grandfather          in his 60s of prostate cancer     Depression Daughter      No Known Problems Daughter      No Known Problems Daughter      No Known Problems Son      No Known Problems Son      Prostate Cancer Maternal Uncle 72         age 72     Prostate Cancer Maternal Uncle 75        doing well     Past Surgical History:   Procedure Laterality Date     [facility] ECHO HEART XTHORACIC, STRESS/REST  2007    Negative       ENVIRONMENTAL HISTORY:   Pets inside the house include 1 cat(s) and 1 dog(s).  Do you smoke cigarettes or other recreational drugs? No There is/are 0 smokers living in the house. The house does not have a damp basement.     SOCIAL HISTORY:   Fito is employed as [employer] . He lives with his family.      Review of Systems      Current Outpatient Medications:      Acetaminophen (TYLENOL PO), , Disp: , Rfl:      albuterol (PROAIR HFA/PROVENTIL HFA/VENTOLIN HFA) 108 (90 Base) MCG/ACT inhaler, Inhale 1-2 puffs into the lungs every 6 hours as needed for shortness of breath or wheezing, Disp: 18 g, Rfl: 1     amLODIPine (NORVASC) 5 MG tablet, Take 1 tablet (5 mg) by mouth daily, Disp: 90 tablet, Rfl: 1     aspirin (ASA) 81 MG EC tablet, Take 1 tablet (81 mg) by mouth daily, Disp: 90 tablet, Rfl: 3     atorvastatin (LIPITOR) 40 MG tablet, Take 1 tablet (40 mg) by mouth daily, Disp: 90 tablet, Rfl: 3     BREYNA 160-4.5 MCG/ACT Inhaler, INHALE 2 PUFFS BY MOUTH TWICE DAILY PLUS 1-2 PUFFS AS NEEDED MAY USE UP TO 12 PUFFS PER DAY, Disp: 20.4 g, Rfl: 11     hydrochlorothiazide (HYDRODIURIL) 25 MG tablet, Take 1 tablet (25 mg) by mouth daily, Disp: 90 tablet, Rfl: 3     montelukast (SINGULAIR) 10 MG tablet, Take 1 tablet (10 mg) by mouth at bedtime, Disp: 90 tablet, Rfl: 3     nortriptyline (PAMELOR) 10 MG capsule, Take 1 capsule (10 mg) by mouth at  bedtime, Disp: 91 capsule, Rfl: 3     omeprazole (PRILOSEC) 20 MG DR capsule, Take 1 capsule (20 mg) by mouth 2 times daily, Disp: 180 capsule, Rfl: 3     predniSONE (DELTASONE) 20 MG tablet, Take 2 tabs daily x 5 days, Disp: 10 tablet, Rfl: 0     VITAMIN D OR, 1 tablet daily, Disp: , Rfl:   Allergies   Allergen Reactions     No Known Drug Allergy      Seasonal Allergies          EXAM:   /84   Pulse 72   Wt 113.2 kg (249 lb 8 oz)   SpO2 100%   BMI 30.39 kg/m      Physical Exam    Constitutional:       General: He is not in acute distress.     Appearance: Normal appearance. He is not ill-appearing.   HENT:      Head: Normocephalic and atraumatic.      Nose: Nose normal. No congestion or rhinorrhea.      Mouth/Throat:      Mouth: Mucous membranes are moist.      Pharynx: Oropharynx is clear. No posterior oropharyngeal erythema.   Eyes:      General:         Right eye: No discharge.         Left eye: No discharge.   Cardiovascular:      Rate and Rhythm: Normal rate and regular rhythm.      Heart sounds: Normal heart sounds.   Pulmonary:      Effort: Pulmonary effort is normal.      Breath sounds: Normal breath sounds. No wheezing or rhonchi.   Skin:     General: Skin is warm.      Findings: No erythema or rash.   Neurological:      General: No focal deficit present.      Mental Status: He is alert. Mental status is at baseline.   Psychiatric:         Mood and Affect: Mood normal.         Behavior: Behavior normal.        ASSESSMENT/PLAN:  Fito Frye is a 53 year old male seen today for severe persistent asthma which is poorly controlled, reflux possibly, and allergic rhinitis based on previous blood test.  Due to his significant increase in asthma symptoms will treat with prednisone.  May consider stopping Singulair and omeprazole in the future.  May refer to gastroenterology to confirm diagnosis of reflux as the patient was not having any significant heartburn symptoms when omeprazole was  started.    Start Prednisone 40 mg in AM x 5 days, call with an update next week  Will check labs  Continue Singulair and Breyna for now  Continue Omeprazole     Follow-up in 2 months      Thank you for allowing me to participate in the care of Fito Frye.      I spent 45 minutes on the date of the encounter doing chart review, history and exam, documentation and further coordination as noted above exclusive of separately reported interpretations    Domingo Ny MD  Allergy/Immunology  [facility]      Again, thank you for allowing me to participate in the care of your patient.        Sincerely,        Domingo Ny MD

## 2024-11-21 NOTE — PATIENT INSTRUCTIONS
Start Prednisone 40 mg in AM x 5 days, call with an update next week  Will check labs  Continue Singulair and Breyna for now  Continue Omeprazole

## 2024-11-21 NOTE — PROGRESS NOTES
Fito Frye was seen in the Allergy Clinic at [facility].    Fito Frye is a 53 year old male being seen today at the request of Maico Ray MD/[facility] in consultation for Severe persistent asthma without complication.    He has seen cardiology a few years ago for shortness of breath which was determined to be non-cardiac cause per patient.  He was referred by Pulmonology due to asthma and seasonal allergies.     Currently taking Symbicort 2 puffs twice daily, and 1-2 more time as needed during the day the last 2 weeks.    He was started on omeprazole, which he tells me he did not have a clear cut history of GERD.  He feels his asthma may have improved with omeprazole.  He has not seen GI.    A chest CT scan was normal.    He has increased shortness of breath, lightheadness, and sleep difficulty.    He feels like his asthma is about the worst it has been in a long time.  He was diagnosed a few years ago with asthma and currently using Symbicort and Singulair and uncertain if the Singulair is providing much benefit.  He is also uncertain if the omeprazole is providing much benefit at 20 mg twice daily.    He is having chest burning sensation and chest tightness with shortness of breath without significant wheezing.  This has been increasing over the last 1 to 2 weeks.  Unknown trigger for his symptoms such as infections recently.    He does not have any significant sneezing or eye watering or nasal congestion consistent with environmental allergies.  However he did have blood test in 2022 that were positive to dust mites, grass, weeds and trees and minimally to cat and dog.     Latest Reference Range & Units 10/12/22 16:59   Allergen A alternata <0.10 KU(A)/L <0.10   Allergen A fumigatus <0.10 KU(A)/L <0.10   Allergen, [place] Grass <0.10 KU(A)/L 0.21 (H)   Allergen C herbarum <0.10 KU(A)/L <0.10   Allergen Cat Dander <0.10 KU(A)/L  0.37 (H)   Allergen Cockroach <0.10 KU(A)/L 0.12 (H)   Allergen D farinae <0.10 KU(A)/L 3.71 (H)   Allergen, D Pteronyssinus <0.10 KU(A)/L 2.95 (H)   Allergen Dog Dander <0.10 KU(A)/L 0.13 (H)   Allergen Elm <0.10 KU(A)/L 0.14 (H)   Allergen Maple <0.10 KU(A)/L 0.19 (H)   Allergen, Mtn Cedar <0.10 KU(A)/L 0.13 (H)   Allergen Oak(white) <0.10 KU(A)/L 0.17 (H)   Allergen P notatum <0.10 KU(A)/L <0.10   Allergen Blair <0.10 KU(A)/L 1.28 (H)   Allergen Tree White [place] IgE <0.10 KU(A)/L <0.10   Allergen Weed Nettle IgE <0.10 KU(A)/L 0.14 (H)   Allergen [place] <0.10 KU(A)/L 0.15 (H)   Allergen Marshelder <0.10 KU(A)/L 0.13 (H)   Allergen, Mouse Urine <0.10 KU(A)/L <0.10   Allergen, Ragweed Short <0.10 KU(A)/L 0.41 (H)   Allergen Russian Thistle <0.10 KU(A)/L 1.32 (H)   Allergen, Silver Birch <0.10 KU(A)/L 0.18 (H)   Allergen White Aniceto <0.10 KU(A)/L 0.76 (H)   Aspergillus Fumagatis 1 Antibody None Detected  None Detected   Aspergillus Fumagatis 6 Antibody None Detected  None Detected   Aureo Pullulans None Detected  None Detected   [place] serum None Detected  None Detected   Micropolyspora Faeni None Detected  None Detected   IGE 0 - 114 kU/L  0 - 114 kU/L 38  37   (H): Data is abnormally high  Past Medical History:   Diagnosis Date    Arthritis     Depressive disorder     Heart disease     Hypertension     NO ACTIVE PROBLEMS     Uncomplicated asthma      Family History   Problem Relation Age of Onset    Myocardial Infarction Mother 70         of MI    Breast Cancer Mother 50        60s or 50s    Coronary Artery Disease Father 65        in his 60s    Diabetes Type 2  Father     Sleep Apnea Father         diagnosed in mid 70's    Multiple myeloma Father 78    No Known Problems Sister     Myocardial Infarction Brother 55        no blockages    Prostate Cancer Maternal Grandfather          in his 60s of prostate cancer    Depression Daughter     No Known Problems Daughter     No Known Problems Daughter      No Known Problems Son     No Known Problems Son     Prostate Cancer Maternal Uncle 72         age 72    Prostate Cancer Maternal Uncle 75        doing well     Past Surgical History:   Procedure Laterality Date    [facility] E[facility] HEART XTHORACIC, STRESS/REST  2007    Negative       ENVIRONMENTAL HISTORY:   Pets inside the house include 1 cat(s) and 1 dog(s).  Do you smoke cigarettes or other recreational drugs? No There is/are 0 smokers living in the house. The house does not have a damp basement.     SOCIAL HISTORY:   Fito is employed as [employer] . He lives with his family.      Review of Systems      Current Outpatient Medications:     Acetaminophen (TYLENOL PO), , Disp: , Rfl:     albuterol (PROAIR HFA/PROVENTIL HFA/VENTOLIN HFA) 108 (90 Base) MCG/ACT inhaler, Inhale 1-2 puffs into the lungs every 6 hours as needed for shortness of breath or wheezing, Disp: 18 g, Rfl: 1    amLODIPine (NORVASC) 5 MG tablet, Take 1 tablet (5 mg) by mouth daily, Disp: 90 tablet, Rfl: 1    aspirin (ASA) 81 MG EC tablet, Take 1 tablet (81 mg) by mouth daily, Disp: 90 tablet, Rfl: 3    atorvastatin (LIPITOR) 40 MG tablet, Take 1 tablet (40 mg) by mouth daily, Disp: 90 tablet, Rfl: 3    BREYNA 160-4.5 MCG/ACT Inhaler, INHALE 2 PUFFS BY MOUTH TWICE DAILY PLUS 1-2 PUFFS AS NEEDED MAY USE UP TO 12 PUFFS PER DAY, Disp: 20.4 g, Rfl: 11    hydrochlorothiazide (HYDRODIURIL) 25 MG tablet, Take 1 tablet (25 mg) by mouth daily, Disp: 90 tablet, Rfl: 3    montelukast (SINGULAIR) 10 MG tablet, Take 1 tablet (10 mg) by mouth at bedtime, Disp: 90 tablet, Rfl: 3    nortriptyline (PAMELOR) 10 MG capsule, Take 1 capsule (10 mg) by mouth at bedtime, Disp: 91 capsule, Rfl: 3    omeprazole (PRILOSEC) 20 MG DR capsule, Take 1 capsule (20 mg) by mouth 2 times daily, Disp: 180 capsule, Rfl: 3    predniSONE (DELTASONE) 20 MG tablet, Take 2 tabs daily x 5 days, Disp: 10 tablet, Rfl: 0    VITAMIN D OR, 1 tablet daily, Disp: , Rfl:   Allergies    Allergen Reactions    No Known Drug Allergy     Seasonal Allergies          EXAM:   /84   Pulse 72   Wt 113.2 kg (249 lb 8 oz)   SpO2 100%   BMI 30.39 kg/m      Physical Exam    Constitutional:       General: He is not in acute distress.     Appearance: Normal appearance. He is not ill-appearing.   HENT:      Head: Normocephalic and atraumatic.      Nose: Nose normal. No congestion or rhinorrhea.      Mouth/Throat:      Mouth: Mucous membranes are moist.      Pharynx: Oropharynx is clear. No posterior oropharyngeal erythema.   Eyes:      General:         Right eye: No discharge.         Left eye: No discharge.   Cardiovascular:      Rate and Rhythm: Normal rate and regular rhythm.      Heart sounds: Normal heart sounds.   Pulmonary:      Effort: Pulmonary effort is normal.      Breath sounds: Normal breath sounds. No wheezing or rhonchi.   Skin:     General: Skin is warm.      Findings: No erythema or rash.   Neurological:      General: No focal deficit present.      Mental Status: He is alert. Mental status is at baseline.   Psychiatric:         Mood and Affect: Mood normal.         Behavior: Behavior normal.        ASSESSMENT/PLAN:  Fito Frye is a 53 year old male seen today for severe persistent asthma which is poorly controlled, reflux possibly, and allergic rhinitis based on previous blood test.  Due to his significant increase in asthma symptoms will treat with prednisone.  May consider stopping Singulair and omeprazole in the future.  May refer to gastroenterology to confirm diagnosis of reflux as the patient was not having any significant heartburn symptoms when omeprazole was started.    Start Prednisone 40 mg in AM x 5 days, call with an update next week  Will check labs  Continue Singulair and Breyna for now  Continue Omeprazole     Follow-up in 2 months      Thank you for allowing me to participate in the care of Fito Frye.      I spent 45 minutes on the date of the encounter  doing chart review, history and exam, documentation and further coordination as noted above exclusive of separately reported interpretations    Domingo Ny MD  Allergy/Immunology  [facility]

## 2024-11-23 ENCOUNTER — MYC MEDICAL ADVICE (OUTPATIENT)
Dept: ALLERGY | Facility: CLINIC | Age: 54
End: 2024-11-23
Payer: COMMERCIAL

## 2024-12-04 ENCOUNTER — MYC MEDICAL ADVICE (OUTPATIENT)
Dept: FAMILY MEDICINE | Facility: CLINIC | Age: 54
End: 2024-12-04
Payer: COMMERCIAL

## 2024-12-30 ENCOUNTER — NURSE TRIAGE (OUTPATIENT)
Dept: FAMILY MEDICINE | Facility: CLINIC | Age: 54
End: 2024-12-30
Payer: COMMERCIAL

## 2024-12-30 ENCOUNTER — MYC REFILL (OUTPATIENT)
Dept: FAMILY MEDICINE | Facility: CLINIC | Age: 54
End: 2024-12-30
Payer: COMMERCIAL

## 2024-12-30 DIAGNOSIS — J45.50 SEVERE PERSISTENT ASTHMA WITHOUT COMPLICATION (H): ICD-10-CM

## 2024-12-30 NOTE — TELEPHONE ENCOUNTER
See 12/30/24 nurse triage encounter.    LENNOX BernalN, RN-[facility]

## 2024-12-30 NOTE — TELEPHONE ENCOUNTER
"Call received from patient:  Refill request for inhaler-Mt  Needs inhaler due to asthma flare  Almost went to ER last night  Using more than usual  Using 12 puffs per day  SOB/tightness in chest  Cannot lie down flat-moderate SOB  Feels like there is \"stuff in chest\"  Last night took an allergy medicine-Benadryl-which causes drowsiness and allowed him to sleep through the night  Otherwise, not able to sleep  Has an emergency inhaler but was told to just use Breyna  Oximeter during phone call: 96%  Peak flow meter is 250, but unsure what he is supposed to be at  Thinks his normal is 200-400 but only checks peak flow meter when feeling ill  Afebrile     Patient spoke in full, complete sentences during phone call.  No audible wheezes heard by writer during phone call.    Writer recommended evaluation today and patient declined.  Patient stated he will take himself to ER if needed.  Writer explained to patient given reported symptoms, it is writer's recommendation patient be evaluated today.  Patient verbalized understanding and declined evaluation.  Writer informed patient refill request (see 12/30/24 MyChart refill encounter) for Breyna sent high priority to covering clinicians, as Dr. Szymanski not in clinic today.    LENNOX BernalN, RN-[facility]        Reason for Disposition   Peak flow rate 50-79% of baseline level (YELLOW zone) after using 2 or 3 inhaler nebulizer treatments given 20 minutes) apart    Additional Information   Negative: SEVERE asthma attack (e.g., struggling for each breath, speaks in single words, loud wheezes, pulse >120) (RED Zone)   Negative: Bluish (or gray) lips or face   Negative: Difficult to awaken or acting confused (e.g., disoriented, slurred speech)   Negative: Passed out (e.g., fainted, lost consciousness, blacked out and was not responding)   Negative: Wheezing started suddenly after medicine, an allergic food, or bee sting   Negative: Sounds like a " life-threatening emergency to the triager   Negative: MODERATE asthma attack (e.g., SOB at rest, speaks in phrases, audible wheezes) AND doesn't have neb or inhaler available   Negative: Peak flow rate less than 50% of baseline level (RED Zone)   Negative: Oxygen level (e.g., pulse oximetry) 90% or lower   Negative: Hospitalized before with asthma; now feels same   Negative: MODERATE asthma attack (e.g., SOB at rest, speaks in phrases, audible wheezes) and not resolved after 2 or 3 inhaler or nebulizer treatments given 20 minutes apart    Protocols used: Asthma Attack-A-OH

## 2024-12-31 RX ORDER — BUDESONIDE AND FORMOTEROL FUMARATE DIHYDRATE 160; 4.5 UG/1; UG/1
AEROSOL RESPIRATORY (INHALATION)
Qty: 20.4 G | Refills: 11 | Status: SHIPPED | OUTPATIENT
Start: 2024-12-31

## 2025-01-22 ASSESSMENT — ASTHMA QUESTIONNAIRES
QUESTION_1 LAST FOUR WEEKS HOW MUCH OF THE TIME DID YOUR ASTHMA KEEP YOU FROM GETTING AS MUCH DONE AT WORK, SCHOOL OR AT HOME: SOME OF THE TIME
ACT_TOTALSCORE: 11
QUESTION_5 LAST FOUR WEEKS HOW WOULD YOU RATE YOUR ASTHMA CONTROL: SOMEWHAT CONTROLLED
QUESTION_3 LAST FOUR WEEKS HOW OFTEN DID YOUR ASTHMA SYMPTOMS (WHEEZING, COUGHING, SHORTNESS OF BREATH, CHEST TIGHTNESS OR PAIN) WAKE YOU UP AT NIGHT OR EARLIER THAN USUAL IN THE MORNING: TWO OR THREE NIGHTS A WEEK
ACT_TOTALSCORE: 11
QUESTION_4 LAST FOUR WEEKS HOW OFTEN HAVE YOU USED YOUR RESCUE INHALER OR NEBULIZER MEDICATION (SUCH AS ALBUTEROL): ONE OR TWO TIMES PER DAY
QUESTION_2 LAST FOUR WEEKS HOW OFTEN HAVE YOU HAD SHORTNESS OF BREATH: MORE THAN ONCE A DAY

## 2025-01-23 ENCOUNTER — OFFICE VISIT (OUTPATIENT)
Dept: ALLERGY | Facility: CLINIC | Age: 55
End: 2025-01-23
Attending: INTERNAL MEDICINE
Payer: COMMERCIAL

## 2025-01-23 ENCOUNTER — LAB (OUTPATIENT)
Dept: LAB | Facility: CLINIC | Age: 55
End: 2025-01-23
Payer: COMMERCIAL

## 2025-01-23 VITALS
HEART RATE: 76 BPM | WEIGHT: 257.5 LBS | DIASTOLIC BLOOD PRESSURE: 84 MMHG | BODY MASS INDEX: 31.36 KG/M2 | RESPIRATION RATE: 16 BRPM | OXYGEN SATURATION: 99 % | SYSTOLIC BLOOD PRESSURE: 125 MMHG

## 2025-01-23 DIAGNOSIS — J45.50 SEVERE PERSISTENT ASTHMA WITHOUT COMPLICATION (H): ICD-10-CM

## 2025-01-23 DIAGNOSIS — R06.02 SHORTNESS OF BREATH: ICD-10-CM

## 2025-01-23 DIAGNOSIS — J30.1 SEASONAL ALLERGIC RHINITIS DUE TO POLLEN: ICD-10-CM

## 2025-01-23 DIAGNOSIS — R06.02 SHORTNESS OF BREATH: Primary | ICD-10-CM

## 2025-01-23 LAB
BASOPHILS # BLD AUTO: 0 10E3/UL (ref 0–0.2)
BASOPHILS NFR BLD AUTO: 0 %
EOSINOPHIL # BLD AUTO: 0 10E3/UL (ref 0–0.7)
EOSINOPHIL NFR BLD AUTO: 1 %
ERYTHROCYTE [DISTWIDTH] IN BLOOD BY AUTOMATED COUNT: 11.7 % (ref 10–15)
HCT VFR BLD AUTO: 47.7 % (ref 40–53)
HGB BLD-MCNC: 15.3 G/DL (ref 13.3–17.7)
IMM GRANULOCYTES # BLD: 0 10E3/UL
IMM GRANULOCYTES NFR BLD: 0 %
LYMPHOCYTES # BLD AUTO: 1.3 10E3/UL (ref 0.8–5.3)
LYMPHOCYTES NFR BLD AUTO: 22 %
MCH RBC QN AUTO: 28.4 PG (ref 26.5–33)
MCHC RBC AUTO-ENTMCNC: 32.1 G/DL (ref 31.5–36.5)
MCV RBC AUTO: 89 FL (ref 78–100)
MONOCYTES # BLD AUTO: 0.6 10E3/UL (ref 0–1.3)
MONOCYTES NFR BLD AUTO: 11 %
NEUTROPHILS # BLD AUTO: 3.7 10E3/UL (ref 1.6–8.3)
NEUTROPHILS NFR BLD AUTO: 66 %
PLATELET # BLD AUTO: 290 10E3/UL (ref 150–450)
RBC # BLD AUTO: 5.38 10E6/UL (ref 4.4–5.9)
TROPONIN T SERPL HS-MCNC: 7 NG/L
WBC # BLD AUTO: 5.6 10E3/UL (ref 4–11)

## 2025-01-23 RX ORDER — PREDNISONE 20 MG/1
TABLET ORAL
Qty: 15 TABLET | Refills: 0 | Status: SHIPPED | OUTPATIENT
Start: 2025-01-23

## 2025-01-23 ASSESSMENT — PAIN SCALES - GENERAL: PAINLEVEL_OUTOF10: MILD PAIN (3)

## 2025-01-23 NOTE — PROGRESS NOTES
Fito Frye was seen in the Allergy Clinic at [facility].    Fito Frye is a 54 year old male being seen today for ongoing evaluation of Seasonal allergic rhinitis, with a history of asthma and possible reflux.    Since the last visit the patient has had chest tightness and shortness of breath on and off since last visit. This morning has been the worse. He has used the breyna 4 times.    He is having chest burning which he describes as a chest tightness.  He has no diaphoresis or pain radiating to his arms.  He has had some lightheadedness similar to previous.  He does have shortness of breath and also some exercise intolerance.  At the last appointment he was given prednisone for 5 days which completely resolved his symptoms.  Since stopping prednisone symptoms have started to return.  The last 2 weeks he has felt terrible.  He has also had some fatigue and muscle pain as of last Friday with a mild cough.  There is been other coworkers that have been sick.  He has not had any fevers or chills.    A recent pulmonary function test 3 months ago showed an FEV1 of 80% of predicted and FVC of 76% of predicted.    He has been using the Symbicort generic up to 8 puffs a day.  He is not sure it is providing much benefit.  He does continue to take Singulair.  He reduced the dose of omeprazole to 20 mg at night.  He does not feel like his burning sensation of the chest is related to reflux.  There does not seem to be any correlation with meals.    He has seen pulmonology on October 7, 2024.    PAST ALLERGY HISTORY (11/2024):  He saw cardiology (2022), for shortness of breath which was determined to be non-cardiac cause (stress echo, EKG, and CTA).      He was referred by Pulmonology due to asthma and seasonal allergies.     Omeprazole he takes, he tells me he did not have a clear cut history of GERD.  He feels his asthma may have improved with omeprazole.  He has not seen GI.    A chest CT  scan was normal.    He has increased shortness of breath, lightheadness, and sleep difficulty.    He is having chest burning sensation and chest tightness with shortness of breath without significant wheezing.     He did have blood test in 2022 that were positive to dust mites, grass, weeds and trees and minimally to cat and dog.     Latest Reference Range & Units 10/12/22 16:59   Allergen A alternata <0.10 KU(A)/L <0.10   Allergen A fumigatus <0.10 KU(A)/L <0.10   Allergen, [place] Grass <0.10 KU(A)/L 0.21 (H)   Allergen C herbarum <0.10 KU(A)/L <0.10   Allergen Cat Dander <0.10 KU(A)/L 0.37 (H)   Allergen Cockroach <0.10 KU(A)/L 0.12 (H)   Allergen D farinae <0.10 KU(A)/L 3.71 (H)   Allergen, D Pteronyssinus <0.10 KU(A)/L 2.95 (H)   Allergen Dog Dander <0.10 KU(A)/L 0.13 (H)   Allergen Elm <0.10 KU(A)/L 0.14 (H)   Allergen Maple <0.10 KU(A)/L 0.19 (H)   Allergen, Mtn Cedar <0.10 KU(A)/L 0.13 (H)   Allergen Oak(white) <0.10 KU(A)/L 0.17 (H)   Allergen P notatum <0.10 KU(A)/L <0.10   Allergen Blair <0.10 KU(A)/L 1.28 (H)   Allergen Tree White [place] IgE <0.10 KU(A)/L <0.10   Allergen Weed Nettle IgE <0.10 KU(A)/L 0.14 (H)   Allergen [place] <0.10 KU(A)/L 0.15 (H)   Allergen Marshelder <0.10 KU(A)/L 0.13 (H)   Allergen, Mouse Urine <0.10 KU(A)/L <0.10   Allergen, Ragweed Short <0.10 KU(A)/L 0.41 (H)   Allergen Russian Thistle <0.10 KU(A)/L 1.32 (H)   Allergen, Silver Birch <0.10 KU(A)/L 0.18 (H)   Allergen White Aniceto <0.10 KU(A)/L 0.76 (H)   Aspergillus Fumagatis 1 Antibody None Detected  None Detected   Aspergillus Fumagatis 6 Antibody None Detected  None Detected   Aureo Pullulans None Detected  None Detected   [place] serum None Detected  None Detected   Micropolyspora Faeni None Detected  None Detected   IGE 0 - 114 kU/L  0 - 114 kU/L 38  37   (H): Data is abnormally high    Past Medical History:   Diagnosis Date    Arthritis     Depressive disorder     Heart disease     Hypertension     NO ACTIVE PROBLEMS      Uncomplicated asthma      Family History   Problem Relation Age of Onset    Myocardial Infarction Mother 70         of MI    Breast Cancer Mother 50        60s or 50s    Coronary Artery Disease Father 65        in his 60s    Diabetes Type 2  Father     Sleep Apnea Father         diagnosed in mid 70's    Multiple myeloma Father 78    No Known Problems Sister     Myocardial Infarction Brother 55        no blockages    Prostate Cancer Maternal Grandfather          in his 60s of prostate cancer    Depression Daughter     No Known Problems Daughter     No Known Problems Daughter     No Known Problems Son     No Known Problems Son     Prostate Cancer Maternal Uncle 72         age 72    Prostate Cancer Maternal Uncle 75        doing well     Past Surgical History:   Procedure Laterality Date    [facility] ECHO HEART XTHORACIC, STRESS/REST  2007    Negative         Current Outpatient Medications:     Acetaminophen (TYLENOL PO), Take by mouth as needed., Disp: , Rfl:     albuterol (PROAIR HFA/PROVENTIL HFA/VENTOLIN HFA) 108 (90 Base) MCG/ACT inhaler, Inhale 1-2 puffs into the lungs every 6 hours as needed for shortness of breath or wheezing, Disp: 18 g, Rfl: 1    amLODIPine (NORVASC) 5 MG tablet, Take 1 tablet (5 mg) by mouth daily, Disp: 90 tablet, Rfl: 1    aspirin (ASA) 81 MG EC tablet, Take 1 tablet (81 mg) by mouth daily, Disp: 90 tablet, Rfl: 3    atorvastatin (LIPITOR) 40 MG tablet, Take 1 tablet (40 mg) by mouth daily, Disp: 90 tablet, Rfl: 3    budesonide-formoterol (BREYNA) 160-4.5 MCG/ACT Inhaler, INHALE 2 PUFFS BY MOUTH TWICE DAILY PLUS 1-2 PUFFS AS NEEDED MAY USE UP TO 12 PUFFS PER DAY, Disp: 20.4 g, Rfl: 11    hydrochlorothiazide (HYDRODIURIL) 25 MG tablet, Take 1 tablet (25 mg) by mouth daily, Disp: 90 tablet, Rfl: 3    montelukast (SINGULAIR) 10 MG tablet, Take 1 tablet (10 mg) by mouth at bedtime, Disp: 90 tablet, Rfl: 3    nortriptyline (PAMELOR) 10 MG capsule, Take 1 capsule (10 mg) by mouth at  bedtime, Disp: 91 capsule, Rfl: 3    omeprazole (PRILOSEC) 20 MG DR capsule, Take 1 capsule (20 mg) by mouth 2 times daily, Disp: 180 capsule, Rfl: 3    predniSONE (DELTASONE) 20 MG tablet, Take 2 tabs daily x 5 days, then 1 tab daily x 5 days, Disp: 15 tablet, Rfl: 0    VITAMIN D OR, 1 tablet daily, Disp: , Rfl:     predniSONE (DELTASONE) 20 MG tablet, Take 2 tabs daily x 5 days (Patient not taking: Reported on 1/23/2025), Disp: 10 tablet, Rfl: 0  Allergies   Allergen Reactions    No Known Drug Allergy     Seasonal Allergies          EXAM:   /84 (BP Location: Left arm, Patient Position: Sitting, Cuff Size: Adult Large)   Pulse 76   Resp 16   Wt 116.8 kg (257 lb 8 oz)   SpO2 99%   BMI 31.36 kg/m      Constitutional:       General: He is not in acute distress.     Appearance: Normal appearance. He is not ill-appearing.   HENT:      Head: Normocephalic and atraumatic.      Nose: Nose normal. No congestion or rhinorrhea.      Mouth/Throat:      Mouth: Mucous membranes are moist.      Pharynx: Oropharynx is clear. No posterior oropharyngeal erythema.   Eyes:      General:         Right eye: No discharge.         Left eye: No discharge.   Cardiovascular:      Rate and Rhythm: Normal rate and regular rhythm.      Heart sounds: Normal heart sounds.   Pulmonary:      Effort: Pulmonary effort is normal.      Breath sounds: Normal breath sounds. No wheezing or rhonchi.   Skin:     General: Skin is warm.      Findings: No erythema or rash.   Neurological:      General: No focal deficit present.      Mental Status: He is alert. Mental status is at baseline.   Psychiatric:         Mood and Affect: Mood normal.         Behavior: Behavior normal.        ASSESSMENT/PLAN:  Fito Frye is a 54 year old male seen today for evaluation of chest pressure and shortness of breath with recent muscle mascorro pain and fatigue.  He almost went to the emergency department this morning.  He has had negative cardiac evaluation in  2022 and did see cardiology.  We discussed possible causes such as an asthma exacerbation, reflux since he did recently reduce omeprazole, or cardiac as a potential cause of his symptoms.  Since the symptoms are similar to the last appointment which were responsive to prednisone, will provide prednisone again with prolonged duration.  If his symptoms worsen or do not quickly improve, did recommend going to emergency department for further evaluation.      Will order a troponin due to his concern for a possible cardiac cause.  It is unusual that he is not responding to the Symbicort.  His pulse, blood pressure and oxygen saturation are all normal.  May consider referral to gastroenterology as reflux could be contributing to some of the symptoms that he is experiencing.  However he was on twice daily omeprazole at the last appointment without much benefit.    Prednisone prescribed  Labs ordered  If not responding to prednisone he can reach out to his primary in regards to next steps for evaluation.  If he does respond, we will need to change his medication regimen.  May consider stopping Singulair and starting Trelegy and consider Biologics.    Follow-up in 2 months      Thank you for allowing me to participate in the care of Fito Frye.      I spent 45 minutes on the date of the encounter doing chart review, history and exam, documentation and further coordination as noted above exclusive of separately reported interpretations    Domingo Ny MD  Allergy/Immunology  [facility]

## 2025-01-23 NOTE — LETTER
1/23/2025      Fito Frye  [address]  [facility] [address]      Dear Colleague,    Thank you for referring your patient, Fito Frye, to the [facility] [place]. Please see a copy of my visit note below.    Fito Frye was seen in the Allergy Clinic at [facility].    Fito Frye is a 54 year old male being seen today for ongoing evaluation of Seasonal allergic rhinitis, with a history of asthma and possible reflux.    Since the last visit the patient has had chest tightness and shortness of breath on and off since last visit. This morning has been the worse. He has used the breyna 4 times.    He is having chest burning which he describes as a chest tightness.  He has no diaphoresis or pain radiating to his arms.  He has had some lightheadedness similar to previous.  He does have shortness of breath and also some exercise intolerance.  At the last appointment he was given prednisone for 5 days which completely resolved his symptoms.  Since stopping prednisone symptoms have started to return.  The last 2 weeks he has felt terrible.  He has also had some fatigue and muscle pain as of last Friday with a mild cough.  There is been other coworkers that have been sick.  He has not had any fevers or chills.    A recent pulmonary function test 3 months ago showed an FEV1 of 80% of predicted and FVC of 76% of predicted.    He has been using the Symbicort generic up to 8 puffs a day.  He is not sure it is providing much benefit.  He does continue to take Singulair.  He reduced the dose of omeprazole to 20 mg at night.  He does not feel like his burning sensation of the chest is related to reflux.  There does not seem to be any correlation with meals.    He has seen pulmonology on October 7, 2024.    PAST ALLERGY HISTORY (11/2024):  He saw cardiology (2022), for shortness of breath which was determined to be non-cardiac cause (stress echo, EKG, and  CTA).      He was referred by Pulmonology due to asthma and seasonal allergies.     Omeprazole he takes, he tells me he did not have a clear cut history of GERD.  He feels his asthma may have improved with omeprazole.  He has not seen GI.    A chest CT scan was normal.    He has increased shortness of breath, lightheadness, and sleep difficulty.    He is having chest burning sensation and chest tightness with shortness of breath without significant wheezing.     He did have blood test in 2022 that were positive to dust mites, grass, weeds and trees and minimally to cat and dog.     Latest Reference Range & Units 10/12/22 16:59   Allergen A alternata <0.10 KU(A)/L <0.10   Allergen A fumigatus <0.10 KU(A)/L <0.10   Allergen, Bermuda Grass <0.10 KU(A)/L 0.21 (H)   Allergen C herbarum <0.10 KU(A)/L <0.10   Allergen Cat Dander <0.10 KU(A)/L 0.37 (H)   Allergen Cockroach <0.10 KU(A)/L 0.12 (H)   Allergen D farinae <0.10 KU(A)/L 3.71 (H)   Allergen, D Pteronyssinus <0.10 KU(A)/L 2.95 (H)   Allergen Dog Dander <0.10 KU(A)/L 0.13 (H)   Allergen Elm <0.10 KU(A)/L 0.14 (H)   Allergen Maple <0.10 KU(A)/L 0.19 (H)   Allergen, Mtn Cedar <0.10 KU(A)/L 0.13 (H)   Allergen Oak(white) <0.10 KU(A)/L 0.17 (H)   Allergen P notatum <0.10 KU(A)/L <0.10   Allergen Blair <0.10 KU(A)/L 1.28 (H)   Allergen Tree White Greenleaf IgE <0.10 KU(A)/L <0.10   Allergen Weed Nettle IgE <0.10 KU(A)/L 0.14 (H)   Allergen Regent <0.10 KU(A)/L 0.15 (H)   Allergen Marshelder <0.10 KU(A)/L 0.13 (H)   Allergen, Mouse Urine <0.10 KU(A)/L <0.10   Allergen, Ragweed Short <0.10 KU(A)/L 0.41 (H)   Allergen Russian Thistle <0.10 KU(A)/L 1.32 (H)   Allergen, Silver Birch <0.10 KU(A)/L 0.18 (H)   Allergen White Aniceto <0.10 KU(A)/L 0.76 (H)   Aspergillus Fumagatis 1 Antibody None Detected  None Detected   Aspergillus Fumagatis 6 Antibody None Detected  None Detected   Aureo Pullulans None Detected  None Detected   Haigler serum None Detected  None Detected    Micropolyspora Faeni None Detected  None Detected   IGE 0 - 114 kU/L  0 - 114 kU/L 38  37   (H): Data is abnormally high    Past Medical History:   Diagnosis Date     Arthritis      Depressive disorder      Heart disease      Hypertension      NO ACTIVE PROBLEMS      Uncomplicated asthma      Family History   Problem Relation Age of Onset     Myocardial Infarction Mother 70         of MI     Breast Cancer Mother 50        60s or 50s     Coronary Artery Disease Father 65        in his 60s     Diabetes Type 2  Father      Sleep Apnea Father         diagnosed in mid 70's     Multiple myeloma Father 78     No Known Problems Sister      Myocardial Infarction Brother 55        no blockages     Prostate Cancer Maternal Grandfather          in his 60s of prostate cancer     Depression Daughter      No Known Problems Daughter      No Known Problems Daughter      No Known Problems Son      No Known Problems Son      Prostate Cancer Maternal Uncle 72         age 72     Prostate Cancer Maternal Uncle 75        doing well     Past Surgical History:   Procedure Laterality Date     [facility] ECHO HEART XTHORACIC, STRESS/REST  2007    Negative         Current Outpatient Medications:      Acetaminophen (TYLENOL PO), Take by mouth as needed., Disp: , Rfl:      albuterol (PROAIR HFA/PROVENTIL HFA/VENTOLIN HFA) 108 (90 Base) MCG/ACT inhaler, Inhale 1-2 puffs into the lungs every 6 hours as needed for shortness of breath or wheezing, Disp: 18 g, Rfl: 1     amLODIPine (NORVASC) 5 MG tablet, Take 1 tablet (5 mg) by mouth daily, Disp: 90 tablet, Rfl: 1     aspirin (ASA) 81 MG EC tablet, Take 1 tablet (81 mg) by mouth daily, Disp: 90 tablet, Rfl: 3     atorvastatin (LIPITOR) 40 MG tablet, Take 1 tablet (40 mg) by mouth daily, Disp: 90 tablet, Rfl: 3     budesonide-formoterol (BREYNA) 160-4.5 MCG/ACT Inhaler, INHALE 2 PUFFS BY MOUTH TWICE DAILY PLUS 1-2 PUFFS AS NEEDED MAY USE UP TO 12 PUFFS PER DAY, Disp: 20.4 g, Rfl: 11      hydrochlorothiazide (HYDRODIURIL) 25 MG tablet, Take 1 tablet (25 mg) by mouth daily, Disp: 90 tablet, Rfl: 3     montelukast (SINGULAIR) 10 MG tablet, Take 1 tablet (10 mg) by mouth at bedtime, Disp: 90 tablet, Rfl: 3     nortriptyline (PAMELOR) 10 MG capsule, Take 1 capsule (10 mg) by mouth at bedtime, Disp: 91 capsule, Rfl: 3     omeprazole (PRILOSEC) 20 MG DR capsule, Take 1 capsule (20 mg) by mouth 2 times daily, Disp: 180 capsule, Rfl: 3     predniSONE (DELTASONE) 20 MG tablet, Take 2 tabs daily x 5 days, then 1 tab daily x 5 days, Disp: 15 tablet, Rfl: 0     VITAMIN D OR, 1 tablet daily, Disp: , Rfl:      predniSONE (DELTASONE) 20 MG tablet, Take 2 tabs daily x 5 days (Patient not taking: Reported on 1/23/2025), Disp: 10 tablet, Rfl: 0  Allergies   Allergen Reactions     No Known Drug Allergy      Seasonal Allergies          EXAM:   /84 (BP Location: Left arm, Patient Position: Sitting, Cuff Size: Adult Large)   Pulse 76   Resp 16   Wt 116.8 kg (257 lb 8 oz)   SpO2 99%   BMI 31.36 kg/m      Constitutional:       General: He is not in acute distress.     Appearance: Normal appearance. He is not ill-appearing.   HENT:      Head: Normocephalic and atraumatic.      Nose: Nose normal. No congestion or rhinorrhea.      Mouth/Throat:      Mouth: Mucous membranes are moist.      Pharynx: Oropharynx is clear. No posterior oropharyngeal erythema.   Eyes:      General:         Right eye: No discharge.         Left eye: No discharge.   Cardiovascular:      Rate and Rhythm: Normal rate and regular rhythm.      Heart sounds: Normal heart sounds.   Pulmonary:      Effort: Pulmonary effort is normal.      Breath sounds: Normal breath sounds. No wheezing or rhonchi.   Skin:     General: Skin is warm.      Findings: No erythema or rash.   Neurological:      General: No focal deficit present.      Mental Status: He is alert. Mental status is at baseline.   Psychiatric:         Mood and Affect: Mood normal.          Behavior: Behavior normal.        ASSESSMENT/PLAN:  Fito Frye is a 54 year old male seen today for evaluation of chest pressure and shortness of breath with recent muscle mascorro pain and fatigue.  He almost went to the emergency department this morning.  He has had negative cardiac evaluation in 2022 and did see cardiology.  We discussed possible causes such as an asthma exacerbation, reflux since he did recently reduce omeprazole, or cardiac as a potential cause of his symptoms.  Since the symptoms are similar to the last appointment which were responsive to prednisone, will provide prednisone again with prolonged duration.  If his symptoms worsen or do not quickly improve, did recommend going to emergency department for further evaluation.      Will order a troponin due to his concern for a possible cardiac cause.  It is unusual that he is not responding to the Symbicort.  His pulse, blood pressure and oxygen saturation are all normal.  May consider referral to gastroenterology as reflux could be contributing to some of the symptoms that he is experiencing.  However he was on twice daily omeprazole at the last appointment without much benefit.    Prednisone prescribed  Labs ordered  If not responding to prednisone he can reach out to his primary in regards to next steps for evaluation.  If he does respond, we will need to change his medication regimen.  May consider stopping Singulair and starting Trelegy and consider Biologics.    Follow-up in 2 months      Thank you for allowing me to participate in the care of Fito Frye.      I spent 45 minutes on the date of the encounter doing chart review, history and exam, documentation and further coordination as noted above exclusive of separately reported interpretations    Domingo Ny MD  Allergy/Immunology  [facility]      Again, thank you for allowing me to participate in the care of your patient.        Sincerely,        Domingo  MD [place]    Electronically signed [facility] Pregnancy And Lactation Text: The risk during pregnancy and breastfeeding is uncertain with this medication.

## 2025-01-23 NOTE — PATIENT INSTRUCTIONS
Allergy Staff Appt Hours Shot Hours Location       Physician   Domingo Ny MD      Support Staff   REYES Mckinley RN, MA Emily J., MA      Mondays Tuesdays Thursdays and Fridays:      [place] 7-5      Wednesdays         Close                Mondays, Tuesdays and Fridays:  7:20 - 3:40              [facility]  [address]  [place] [address]  Allergy appointment  line: [phone]    Pulmonary Function Scheduling:  [place]: [phone]           Questions about cost of your care  For questions about your cost of your visit, procedure, lab or imaging contact: [facility] [phone] or visit:  [email or web address]    Prescription Assistance  If you need assistance with your prescriptions (cost, coverage, etc) please contact: [facility] [employer] [facility] Prescription Assistance Program [phone]    Important Scheduling Information  All visits for food challenges, medication/drug allergy testing, and drug challenges MUST be scheduled through the allergy clinic nurse. Please contact them via [employer] or by calling the clinic at [phone] and asking to speak with an allergy nurse. They will provide additional information and instructions for the appointment. Discontinue oral antihistamines 7 days prior to the appointment. Discontinue nasal and ocular antihistamines 1 day prior to the appointment.    Appointments for skin testing: Appointment will last approximately 45 minutes.  Please call the appointment line for your clinic to schedule.  Discontinue oral antihistamines 7 days prior to the appointment.  Discontinue nasal and ocular antihistamines 1 days prior to appointment.    Thank you for trusting us with your care. Please feel free to contact us with any questions or concerns you may have.

## 2025-01-25 ENCOUNTER — OFFICE VISIT (OUTPATIENT)
Dept: URGENT CARE | Facility: URGENT CARE | Age: 55
End: 2025-01-25
Payer: COMMERCIAL

## 2025-01-25 VITALS
DIASTOLIC BLOOD PRESSURE: 88 MMHG | HEART RATE: 74 BPM | WEIGHT: 253 LBS | OXYGEN SATURATION: 97 % | TEMPERATURE: 97.8 F | RESPIRATION RATE: 16 BRPM | BODY MASS INDEX: 30.81 KG/M2 | SYSTOLIC BLOOD PRESSURE: 143 MMHG

## 2025-01-25 DIAGNOSIS — J02.9 SORE THROAT: Primary | ICD-10-CM

## 2025-01-25 DIAGNOSIS — J06.9 UPPER RESPIRATORY TRACT INFECTION, UNSPECIFIED TYPE: ICD-10-CM

## 2025-01-25 LAB
DEPRECATED S PYO AG THROAT QL EIA: NEGATIVE
S PYO DNA THROAT QL NAA+PROBE: NOT DETECTED

## 2025-01-25 PROCEDURE — 99213 OFFICE O/P EST LOW 20 MIN: CPT | Performed by: FAMILY MEDICINE

## 2025-01-25 PROCEDURE — 87651 STREP A DNA AMP PROBE: CPT | Performed by: FAMILY MEDICINE

## 2025-01-25 ASSESSMENT — PAIN SCALES - GENERAL: PAINLEVEL_OUTOF10: MODERATE PAIN (4)

## 2025-01-25 NOTE — PROGRESS NOTES
Assessment & Plan     Sore throat  - Streptococcus A Rapid Screen w/Reflex to PCR - Clinic Collect  - Group A Streptococcus PCR Throat Swab    Upper respiratory tract infection, unspecified type     Normal throat exam -- symptoms consistent with viral URI symptomatic management advised.   Strep PCR pending.  No signs of peritonsillar abscess.  Patient confirms no respiratory difficulties.  Advised ibuprofen as needed for discomfort      Jerrod Mcadams MD   [place] [facility]    Berkley Payton is a 54 year old male who presents to clinic today for the following health issues:  Chief Complaint   Patient presents with    Urgent Care    Pharyngitis     St for a week     HPI    Illness at work recently -- no exposures to influenza/covid/walking pneumonia  Absent of headache. No exposures to strep  Received prednisone 2 days ago by asthma doctor for concern for URI.   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      albuterol (PROAIR HFA/PROVENTIL HFA/VENTOLIN HFA) 108 (90 Base) MCG/ACT inhaler Inhale 1-2 puffs into the lungs every 6 hours as needed for shortness of breath or wheezing 18 g 1    amLODIPine (NORVASC) 5 MG tablet Take 1 tablet (5 mg) by mouth daily 90 tablet 1    aspirin (ASA) 81 MG EC tablet Take 1 tablet (81 mg) by mouth daily 90 tablet 3    atorvastatin (LIPITOR) 40 MG tablet Take 1 tablet (40 mg) by mouth daily 90 tablet 3     budesonide-formoterol (BREYNA) 160-4.5 MCG/ACT Inhaler INHALE 2 PUFFS BY MOUTH TWICE DAILY PLUS 1-2 PUFFS AS NEEDED MAY USE UP TO 12 PUFFS PER DAY 20.4 g 11    hydrochlorothiazide (HYDRODIURIL) 25 MG tablet Take 1 tablet (25 mg) by mouth daily 90 tablet 3    montelukast (SINGULAIR) 10 MG tablet Take 1 tablet (10 mg) by mouth at bedtime 90 tablet 3    nortriptyline (PAMELOR) 10 MG capsule Take 1 capsule (10 mg) by mouth at bedtime 91 capsule 3    omeprazole (PRILOSEC) 20 MG DR capsule Take 1 capsule (20 mg) by mouth 2 times daily 180 capsule 3    predniSONE (DELTASONE) 20 MG tablet Take 2 tabs daily x 5 days, then 1 tab daily x 5 days 15 tablet 0    predniSONE (DELTASONE) 20 MG tablet Take 2 tabs daily x 5 days (Patient not taking: Reported on 1/23/2025) 10 tablet 0    VITAMIN D OR 1 tablet daily       No current facility-administered medications for this visit.           Objective    BP (!) 143/88   Pulse 74   Temp 97.8  F (36.6  C)   Resp 16   Wt 114.8 kg (253 lb)   SpO2 97%   BMI 30.81 kg/m    Physical Exam   As noted above and including:   GEN: NAD  Throat: no enlarged tonsils minimal erythema no trismus uvula midline  Pulm: clear bilaterally without notable wheezes/rhonchi, good aeration  Neck : no enlarged nodes  Results for orders placed or performed in visit on 01/25/25   Streptococcus A Rapid Screen w/Reflex to PCR - Clinic Collect     Status: Normal    Specimen: Throat; Swab   Result Value Ref Range    Group A Strep antigen Negative Negative                     The use of Dragon/QA on [employer] services may have been used to construct the content in this note; any grammatical or spelling errors are non-intentional. Please contact the author of this note directly if you are in need of any clarification.

## 2025-01-25 NOTE — PATIENT INSTRUCTIONS
Ibuprofen 600mg every 6 hours as needed for pain      Continue your prednisone as prescribed by your asthma doctor      We will call you if the strep PCR is positive      If symptoms worsen please seek help right away

## 2025-01-27 ENCOUNTER — MYC MEDICAL ADVICE (OUTPATIENT)
Dept: ALLERGY | Facility: CLINIC | Age: 55
End: 2025-01-27
Payer: COMMERCIAL

## 2025-03-10 DIAGNOSIS — I10 BENIGN ESSENTIAL HYPERTENSION: ICD-10-CM

## 2025-03-10 RX ORDER — AMLODIPINE BESYLATE 5 MG/1
5 TABLET ORAL DAILY
Qty: 90 TABLET | Refills: 1 | Status: SHIPPED | OUTPATIENT
Start: 2025-03-10

## 2025-03-10 NOTE — TELEPHONE ENCOUNTER
Pending Prescriptions:                       Disp   Refills    amLODIPine (NORVASC) 5 MG tablet          90 tab*1            Sig: Take 1 tablet (5 mg) by mouth daily.

## 2025-03-31 ENCOUNTER — OFFICE VISIT (OUTPATIENT)
Dept: ALLERGY | Facility: CLINIC | Age: 55
End: 2025-03-31
Payer: COMMERCIAL

## 2025-03-31 VITALS
SYSTOLIC BLOOD PRESSURE: 135 MMHG | RESPIRATION RATE: 16 BRPM | HEART RATE: 70 BPM | DIASTOLIC BLOOD PRESSURE: 89 MMHG | BODY MASS INDEX: 31.46 KG/M2 | WEIGHT: 258.3 LBS | OXYGEN SATURATION: 97 %

## 2025-03-31 DIAGNOSIS — J45.50 SEVERE PERSISTENT ASTHMA WITHOUT COMPLICATION (H): Primary | ICD-10-CM

## 2025-03-31 DIAGNOSIS — J30.1 SEASONAL ALLERGIC RHINITIS DUE TO POLLEN: ICD-10-CM

## 2025-03-31 PROCEDURE — 3075F SYST BP GE 130 - 139MM HG: CPT | Performed by: INTERNAL MEDICINE

## 2025-03-31 PROCEDURE — 99214 OFFICE O/P EST MOD 30 MIN: CPT | Performed by: INTERNAL MEDICINE

## 2025-03-31 PROCEDURE — 3079F DIAST BP 80-89 MM HG: CPT | Performed by: INTERNAL MEDICINE

## 2025-03-31 RX ORDER — FLUTICASONE FUROATE, UMECLIDINIUM BROMIDE AND VILANTEROL TRIFENATATE 200; 62.5; 25 UG/1; UG/1; UG/1
1 POWDER RESPIRATORY (INHALATION) DAILY
Qty: 1 EACH | Refills: 11 | Status: SHIPPED | OUTPATIENT
Start: 2025-03-31

## 2025-03-31 ASSESSMENT — ASTHMA QUESTIONNAIRES
QUESTION_2 LAST FOUR WEEKS HOW OFTEN HAVE YOU HAD SHORTNESS OF BREATH: ONCE A DAY
QUESTION_1 LAST FOUR WEEKS HOW MUCH OF THE TIME DID YOUR ASTHMA KEEP YOU FROM GETTING AS MUCH DONE AT WORK, SCHOOL OR AT HOME: A LITTLE OF THE TIME
ACT_TOTALSCORE: 17
ACT_TOTALSCORE: 17
QUESTION_4 LAST FOUR WEEKS HOW OFTEN HAVE YOU USED YOUR RESCUE INHALER OR NEBULIZER MEDICATION (SUCH AS ALBUTEROL): NOT AT ALL
QUESTION_5 LAST FOUR WEEKS HOW WOULD YOU RATE YOUR ASTHMA CONTROL: SOMEWHAT CONTROLLED
QUESTION_3 LAST FOUR WEEKS HOW OFTEN DID YOUR ASTHMA SYMPTOMS (WHEEZING, COUGHING, SHORTNESS OF BREATH, CHEST TIGHTNESS OR PAIN) WAKE YOU UP AT NIGHT OR EARLIER THAN USUAL IN THE MORNING: ONCE A WEEK

## 2025-03-31 NOTE — LETTER
3/31/2025      Fito Frye  [address]  [facility] [address]      Dear Colleague,    Thank you for referring your patient, Fito Frye, to the [facility] [place]. Please see a copy of my visit note below.    Fito Frye was seen in the Allergy Clinic at [facility].    Fito Frye is a 54 year old male being seen today for ongoing evaluation of seasonal allergic rhinitis, with a history of asthma and possible reflux.      At the last appointment he was having significant symptoms of shortness of breath as well as chest tightness and pressure with fatigue and he felt terrible.  He was given prednisone and his symptoms did improve.  He did go to urgent care a few days later and strep testing was negative.  He also has chest burning for which omeprazole seems to provide mild benefit.    He also brought up concerns in regards to weight gain.  He estimates he has had a 20 to 30 pound weight gain over the last several months despite lifestyle changes with increased exercise and decreased amount of alcohol drinking to 2-3 a week.    He also has allergic rhinitis.    PAST ALLERGY HISTORY (2024):  Pulmonary function testing showed an FEV1 of 80% of predicted and FVC of 76% of predicted.    He has seen pulmonology on 2024.    He saw cardiology (), for shortness of breath which was determined to be non-cardiac cause (stress echo, EKG, and CTA).      He was referred by Pulmonology due to asthma and seasonal allergies.     A chest CT scan was normal.    He did have blood test in  that were positive to dust mites, grass, weeds and trees and minimally to cat and dog.      Past Medical History:   Diagnosis Date     Arthritis      Depressive disorder      Heart disease      Hypertension      NO ACTIVE PROBLEMS      Uncomplicated asthma      Family History   Problem Relation Age of Onset     Myocardial Infarction Mother 70         of  MI     Breast Cancer Mother 50        60s or 50s     Coronary Artery Disease Father 65        in his 60s     Diabetes Type 2  Father      Sleep Apnea Father         diagnosed in mid 70's     Multiple myeloma Father 78     No Known Problems Sister      Myocardial Infarction Brother 55        no blockages     Prostate Cancer Maternal Grandfather          in his 60s of prostate cancer     Depression Daughter      No Known Problems Daughter      No Known Problems Daughter      No Known Problems Son      No Known Problems Son      Prostate Cancer Maternal Uncle 72         age 72     Prostate Cancer Maternal Uncle 75        doing well     Past Surgical History:   Procedure Laterality Date     [facility] ECHO HEART XTHORACIC, STRESS/REST  2007    Negative         Current Outpatient Medications:      Acetaminophen (TYLENOL PO), Take by mouth as needed., Disp: , Rfl:      albuterol (PROAIR HFA/PROVENTIL HFA/VENTOLIN HFA) 108 (90 Base) MCG/ACT inhaler, Inhale 1-2 puffs into the lungs every 6 hours as needed for shortness of breath or wheezing, Disp: 18 g, Rfl: 1     amLODIPine (NORVASC) 5 MG tablet, Take 1 tablet (5 mg) by mouth daily., Disp: 90 tablet, Rfl: 1     aspirin (ASA) 81 MG EC tablet, Take 1 tablet (81 mg) by mouth daily, Disp: 90 tablet, Rfl: 3     atorvastatin (LIPITOR) 40 MG tablet, Take 1 tablet (40 mg) by mouth daily, Disp: 90 tablet, Rfl: 3     budesonide-formoterol (BREYNA) 160-4.5 MCG/ACT Inhaler, INHALE 2 PUFFS BY MOUTH TWICE DAILY PLUS 1-2 PUFFS AS NEEDED MAY USE UP TO 12 PUFFS PER DAY, Disp: 20.4 g, Rfl: 11     Fluticasone-Umeclidin-Vilanterol (TRELEGY ELLIPTA) 200-62.5-25 MCG/ACT oral inhaler, Inhale 1 puff into the lungs daily., Disp: 1 each, Rfl: 11     hydrochlorothiazide (HYDRODIURIL) 25 MG tablet, Take 1 tablet (25 mg) by mouth daily, Disp: 90 tablet, Rfl: 3     montelukast (SINGULAIR) 10 MG tablet, Take 1 tablet (10 mg) by mouth at bedtime, Disp: 90 tablet, Rfl: 3     nortriptyline (PAMELOR) 10  MG capsule, Take 1 capsule (10 mg) by mouth at bedtime, Disp: 91 capsule, Rfl: 3     omeprazole (PRILOSEC) 20 MG DR capsule, Take 1 capsule (20 mg) by mouth 2 times daily, Disp: 180 capsule, Rfl: 3     VITAMIN D OR, 1 tablet daily, Disp: , Rfl:      predniSONE (DELTASONE) 20 MG tablet, Take 2 tabs daily x 5 days, then 1 tab daily x 5 days (Patient not taking: Reported on 3/31/2025), Disp: 15 tablet, Rfl: 0     predniSONE (DELTASONE) 20 MG tablet, Take 2 tabs daily x 5 days (Patient not taking: Reported on 3/31/2025), Disp: 10 tablet, Rfl: 0  Allergies   Allergen Reactions     No Known Drug Allergy      Seasonal Allergies          EXAM:   /89 (BP Location: Left arm, Patient Position: Sitting, Cuff Size: Adult Large)   Pulse 70   Resp 16   Wt 117.2 kg (258 lb 4.8 oz)   SpO2 97%   BMI 31.46 kg/m      Constitutional:       General: He is not in acute distress.     Appearance: Normal appearance. He is not ill-appearing.   HENT:      Head: Normocephalic and atraumatic.      Nose: Nose normal. No congestion or rhinorrhea.      Mouth/Throat:      Mouth: Mucous membranes are moist.      Pharynx: Oropharynx is clear. No posterior oropharyngeal erythema.   Eyes:      General:         Right eye: No discharge.         Left eye: No discharge.   Cardiovascular:      Rate and Rhythm: Normal rate and regular rhythm.      Heart sounds: Normal heart sounds.   Pulmonary:      Effort: Pulmonary effort is normal.      Breath sounds: Normal breath sounds. No wheezing or rhonchi.   Skin:     General: Skin is warm.      Findings: No erythema or rash.   Neurological:      General: No focal deficit present.      Mental Status: He is alert. Mental status is at baseline.   Psychiatric:         Mood and Affect: Mood normal.         Behavior: Behavior normal.        ASSESSMENT/PLAN:  Fito Frye is a 54 year old male seen today for both allergic rhinitis as well as asthma.  His symptoms are improved since being treated with  prednisone.  He is using Symbicort 2 puffs twice daily.  A troponin was drawn at the last appointment due to chest pressure which was normal.    I did recommend reaching out to his primary care provider in regards to the concern for weight gain of 20 to 30 pounds over the last several months.    Since his symptoms are not completely resolved we will make a change of his inhalers to Trelegy 1 puff daily.    [employer] to print off a coupon  Trelegy 1 puff daily.  May still use Symbicort 2 puffs as needed as a rescue inhaler or Albuterol.    Follow-up in 3 months      Thank you for allowing me to participate in the care of Fito Frye.      I spent 35 minutes on the date of the encounter doing chart review, history and exam, documentation and further coordination as noted above exclusive of separately reported interpretations    Domingo Ny MD  Allergy/Immunology  [facility]      Again, thank you for allowing me to participate in the care of your patient.        Sincerely,        Domingo Ny MD    Electronically signed

## 2025-03-31 NOTE — PROGRESS NOTES
Fito Frye was seen in the Allergy Clinic at [facility].    Fito Frye is a 54 year old male being seen today for ongoing evaluation of seasonal allergic rhinitis, with a history of asthma and possible reflux.      At the last appointment he was having significant symptoms of shortness of breath as well as chest tightness and pressure with fatigue and he felt terrible.  He was given prednisone and his symptoms did improve.  He did go to urgent care a few days later and strep testing was negative.  He also has chest burning for which omeprazole seems to provide mild benefit.    He also brought up concerns in regards to weight gain.  He estimates he has had a 20 to 30 pound weight gain over the last several months despite lifestyle changes with increased exercise and decreased amount of alcohol drinking to 2-3 a week.    He also has allergic rhinitis.    PAST ALLERGY HISTORY (2024):  Pulmonary function testing showed an FEV1 of 80% of predicted and FVC of 76% of predicted.    He has seen pulmonology on 2024.    He saw cardiology (), for shortness of breath which was determined to be non-cardiac cause (stress echo, EKG, and CTA).      He was referred by Pulmonology due to asthma and seasonal allergies.     A chest CT scan was normal.    He did have blood test in  that were positive to dust mites, grass, weeds and trees and minimally to cat and dog.      Past Medical History:   Diagnosis Date    Arthritis     Depressive disorder     Heart disease     Hypertension     NO ACTIVE PROBLEMS     Uncomplicated asthma      Family History   Problem Relation Age of Onset    Myocardial Infarction Mother 70         of MI    Breast Cancer Mother 50        60s or 50s    Coronary Artery Disease Father 65        in his 60s    Diabetes Type 2  Father     Sleep Apnea Father         diagnosed in mid 70's    Multiple myeloma Father 78    No Known Problems Sister     Myocardial  Infarction Brother 55        no blockages    Prostate Cancer Maternal Grandfather          in his 60s of prostate cancer    Depression Daughter     No Known Problems Daughter     No Known Problems Daughter     No Known Problems Son     No Known Problems Son     Prostate Cancer Maternal Uncle 72         age 72    Prostate Cancer Maternal Uncle 75        doing well     Past Surgical History:   Procedure Laterality Date    [facility] ECHO HEART XTHORACIC, STRESS/REST  2007    Negative         Current Outpatient Medications:     Acetaminophen (TYLENOL PO), Take by mouth as needed., Disp: , Rfl:     albuterol (PROAIR HFA/PROVENTIL HFA/VENTOLIN HFA) 108 (90 Base) MCG/ACT inhaler, Inhale 1-2 puffs into the lungs every 6 hours as needed for shortness of breath or wheezing, Disp: 18 g, Rfl: 1    amLODIPine (NORVASC) 5 MG tablet, Take 1 tablet (5 mg) by mouth daily., Disp: 90 tablet, Rfl: 1    aspirin (ASA) 81 MG EC tablet, Take 1 tablet (81 mg) by mouth daily, Disp: 90 tablet, Rfl: 3    atorvastatin (LIPITOR) 40 MG tablet, Take 1 tablet (40 mg) by mouth daily, Disp: 90 tablet, Rfl: 3    budesonide-formoterol (BREYNA) 160-4.5 MCG/ACT Inhaler, INHALE 2 PUFFS BY MOUTH TWICE DAILY PLUS 1-2 PUFFS AS NEEDED MAY USE UP TO 12 PUFFS PER DAY, Disp: 20.4 g, Rfl: 11    Fluticasone-Umeclidin-Vilanterol (TRELEGY ELLIPTA) 200-62.5-25 MCG/ACT oral inhaler, Inhale 1 puff into the lungs daily., Disp: 1 each, Rfl: 11    hydrochlorothiazide (HYDRODIURIL) 25 MG tablet, Take 1 tablet (25 mg) by mouth daily, Disp: 90 tablet, Rfl: 3    montelukast (SINGULAIR) 10 MG tablet, Take 1 tablet (10 mg) by mouth at bedtime, Disp: 90 tablet, Rfl: 3    nortriptyline (PAMELOR) 10 MG capsule, Take 1 capsule (10 mg) by mouth at bedtime, Disp: 91 capsule, Rfl: 3    omeprazole (PRILOSEC) 20 MG DR capsule, Take 1 capsule (20 mg) by mouth 2 times daily, Disp: 180 capsule, Rfl: 3    VITAMIN D OR, 1 tablet daily, Disp: , Rfl:     predniSONE (DELTASONE) 20 MG  tablet, Take 2 tabs daily x 5 days, then 1 tab daily x 5 days (Patient not taking: Reported on 3/31/2025), Disp: 15 tablet, Rfl: 0    predniSONE (DELTASONE) 20 MG tablet, Take 2 tabs daily x 5 days (Patient not taking: Reported on 3/31/2025), Disp: 10 tablet, Rfl: 0  Allergies   Allergen Reactions    No Known Drug Allergy     Seasonal Allergies          EXAM:   /89 (BP Location: Left arm, Patient Position: Sitting, Cuff Size: Adult Large)   Pulse 70   Resp 16   Wt 117.2 kg (258 lb 4.8 oz)   SpO2 97%   BMI 31.46 kg/m      Constitutional:       General: He is not in acute distress.     Appearance: Normal appearance. He is not ill-appearing.   HENT:      Head: Normocephalic and atraumatic.      Nose: Nose normal. No congestion or rhinorrhea.      Mouth/Throat:      Mouth: Mucous membranes are moist.      Pharynx: Oropharynx is clear. No posterior oropharyngeal erythema.   Eyes:      General:         Right eye: No discharge.         Left eye: No discharge.   Cardiovascular:      Rate and Rhythm: Normal rate and regular rhythm.      Heart sounds: Normal heart sounds.   Pulmonary:      Effort: Pulmonary effort is normal.      Breath sounds: Normal breath sounds. No wheezing or rhonchi.   Skin:     General: Skin is warm.      Findings: No erythema or rash.   Neurological:      General: No focal deficit present.      Mental Status: He is alert. Mental status is at baseline.   Psychiatric:         Mood and Affect: Mood normal.         Behavior: Behavior normal.        ASSESSMENT/PLAN:  Fito Frye is a 54 year old male seen today for both allergic rhinitis as well as asthma.  His symptoms are improved since being treated with prednisone.  He is using Symbicort 2 puffs twice daily.  A troponin was drawn at the last appointment due to chest pressure which was normal.    I did recommend reaching out to his primary care provider in regards to the concern for weight gain of 20 to 30 pounds over the last several  months.    Since his symptoms are not completely resolved we will make a change of his inhalers to Trelegy 1 puff daily.    [employer] to print off a coupon  Trelegy 1 puff daily.  May still use Symbicort 2 puffs as needed as a rescue inhaler or Albuterol.    Follow-up in 3 months      Thank you for allowing me to participate in the care of Fito Frye.      I spent 35 minutes on the date of the encounter doing chart review, history and exam, documentation and further coordination as noted above exclusive of separately reported interpretations    Domingo Ny MD  Allergy/Immunology  [facility]

## 2025-03-31 NOTE — PATIENT INSTRUCTIONS
[email or web address] to print off a coupon  Trelegy 1 puff daily.  May still use Symbicort 2 puffs as needed as a rescue inhaler or Albuterol.      Allergy Staff Appt Hours Shot Hours Location       Physician   Domingo Ny MD      Support Staff   REYES Mckinley RN, MA Emily J., MA      Mondays Tuesdays Thursdays and Fridays:      [place] 7-5      Wednesdays         Close                Mondays, Tuesdays and Fridays:  7:20 - 3:40              [facility]  [address]  [place] [address]  Allergy appointment  line: [phone]    Pulmonary Function Scheduling:  [place]: [phone]           Questions about cost of your care  For questions about your cost of your visit, procedure, lab or imaging contact: [employer] Price Line [phone] or visit:  [email or web address]    Prescription Assistance  If you need assistance with your prescriptions (cost, coverage, etc) please contact: [employer] Prescription Assistance Program [phone]    Important Scheduling Information  All visits for food challenges, medication/drug allergy testing, and drug challenges MUST be scheduled through the allergy clinic nurse. Please contact them via [employer] or by calling the clinic at [phone] and asking to speak with an allergy nurse. They will provide additional information and instructions for the appointment. Discontinue oral antihistamines 7 days prior to the appointment. Discontinue nasal and ocular antihistamines 1 day prior to the appointment.    Appointments for skin testing: Appointment will last approximately 45 minutes.  Please call the appointment line for your clinic to schedule.  Discontinue oral antihistamines 7 days prior to the appointment.  Discontinue nasal and ocular antihistamines 1 days prior to appointment.    Thank you for trusting us with your care. Please feel free to contact us with any questions or concerns  you may have.

## 2025-05-28 ENCOUNTER — PATIENT OUTREACH (OUTPATIENT)
Dept: CARE COORDINATION | Facility: CLINIC | Age: 55
End: 2025-05-28
Payer: COMMERCIAL

## 2025-06-17 DIAGNOSIS — I10 BENIGN ESSENTIAL HYPERTENSION: ICD-10-CM

## 2025-06-18 RX ORDER — HYDROCHLOROTHIAZIDE 25 MG/1
25 TABLET ORAL DAILY
Qty: 90 TABLET | Refills: 0 | Status: SHIPPED | OUTPATIENT
Start: 2025-06-18

## 2025-06-23 ASSESSMENT — ASTHMA QUESTIONNAIRES
QUESTION_1 LAST FOUR WEEKS HOW MUCH OF THE TIME DID YOUR ASTHMA KEEP YOU FROM GETTING AS MUCH DONE AT WORK, SCHOOL OR AT HOME: SOME OF THE TIME
ACT_TOTALSCORE: 15
QUESTION_2 LAST FOUR WEEKS HOW OFTEN HAVE YOU HAD SHORTNESS OF BREATH: THREE TO SIX TIMES A WEEK
QUESTION_4 LAST FOUR WEEKS HOW OFTEN HAVE YOU USED YOUR RESCUE INHALER OR NEBULIZER MEDICATION (SUCH AS ALBUTEROL): TWO OR THREE TIMES PER WEEK
QUESTION_3 LAST FOUR WEEKS HOW OFTEN DID YOUR ASTHMA SYMPTOMS (WHEEZING, COUGHING, SHORTNESS OF BREATH, CHEST TIGHTNESS OR PAIN) WAKE YOU UP AT NIGHT OR EARLIER THAN USUAL IN THE MORNING: ONCE A WEEK
QUESTION_5 LAST FOUR WEEKS HOW WOULD YOU RATE YOUR ASTHMA CONTROL: SOMEWHAT CONTROLLED

## 2025-06-24 ENCOUNTER — OFFICE VISIT (OUTPATIENT)
Dept: ALLERGY | Facility: CLINIC | Age: 55
End: 2025-06-24
Attending: INTERNAL MEDICINE
Payer: COMMERCIAL

## 2025-06-24 VITALS
HEART RATE: 75 BPM | BODY MASS INDEX: 30.73 KG/M2 | SYSTOLIC BLOOD PRESSURE: 132 MMHG | DIASTOLIC BLOOD PRESSURE: 86 MMHG | WEIGHT: 252.3 LBS | OXYGEN SATURATION: 98 %

## 2025-06-24 DIAGNOSIS — J30.1 SEASONAL ALLERGIC RHINITIS DUE TO POLLEN: ICD-10-CM

## 2025-06-24 DIAGNOSIS — J45.50 SEVERE PERSISTENT ASTHMA WITHOUT COMPLICATION (H): ICD-10-CM

## 2025-06-24 PROCEDURE — 3079F DIAST BP 80-89 MM HG: CPT | Performed by: INTERNAL MEDICINE

## 2025-06-24 PROCEDURE — 99214 OFFICE O/P EST MOD 30 MIN: CPT | Performed by: INTERNAL MEDICINE

## 2025-06-24 PROCEDURE — 3075F SYST BP GE 130 - 139MM HG: CPT | Performed by: INTERNAL MEDICINE

## 2025-06-24 NOTE — PROGRESS NOTES
Fito Frye was seen in the Allergy Clinic at [facility].    Fito Frye is a 54 year old male being seen today for ongoing evaluation of seasonal allergic rhinitis, with a history of asthma and reflux.      He was given prednisone in November as well as January for asthma symptoms which completely resolved his symptoms.  He does continue to take Singulair and is uncertain if it provides any benefit.  Trelegy was prescribed at the last appointment however he did not find this to provide any additional benefit compared to Breyna.  He would like to continue with Breyna and has not used Trelegy in the last 1 to 2 months.    He does use the Breyna 2 puffs twice daily and occasionally will use additional doses as prescribed.  Sometimes this will be up to 3 times a week.      Symptoms today include mild chest congestion as well as his ears feel clogged and he feels like his decreased hearing.    PAST ALLERGY HISTORY (2024):  Pulmonary function testing showed an FEV1 of 80% of predicted and FVC of 76% of predicted.    He has seen pulmonology on 2024.    He saw cardiology (), for shortness of breath which was determined to be non-cardiac cause (stress echo, EKG, and CTA).      He was referred by Pulmonology due to asthma and seasonal allergies.     A chest CT scan was normal.    He did have blood test in  that were positive to dust mites, grass, weeds and trees and minimally to cat and dog.      Past Medical History:   Diagnosis Date    Arthritis     Depressive disorder     Heart disease     Hypertension     NO ACTIVE PROBLEMS     Uncomplicated asthma      Family History   Problem Relation Age of Onset    Myocardial Infarction Mother 70         of MI    Breast Cancer Mother 50        60s or 50s    Coronary Artery Disease Father 65        in his 60s    Diabetes Type 2  Father     Sleep Apnea Father         diagnosed in mid 70's    Multiple myeloma Father 78    No  Known Problems Sister     Myocardial Infarction Brother 55        no blockages    Prostate Cancer Maternal Grandfather          in his 60s of prostate cancer    Depression Daughter     No Known Problems Daughter     No Known Problems Daughter     No Known Problems Son     No Known Problems Son     Prostate Cancer Maternal Uncle 72         age 72    Prostate Cancer Maternal Uncle 75        doing well     Past Surgical History:   Procedure Laterality Date    [facility] ECHO HEART XTHORACIC, STRESS/REST  2007    Negative         Current Outpatient Medications:     Acetaminophen (TYLENOL PO), Take by mouth as needed., Disp: , Rfl:     amLODIPine (NORVASC) 5 MG tablet, Take 1 tablet (5 mg) by mouth daily., Disp: 90 tablet, Rfl: 1    aspirin (ASA) 81 MG EC tablet, Take 1 tablet (81 mg) by mouth daily, Disp: 90 tablet, Rfl: 3    atorvastatin (LIPITOR) 40 MG tablet, Take 1 tablet (40 mg) by mouth daily, Disp: 90 tablet, Rfl: 3    budesonide-formoterol (BREYNA) 160-4.5 MCG/ACT Inhaler, INHALE 2 PUFFS BY MOUTH TWICE DAILY PLUS 1-2 PUFFS AS NEEDED MAY USE UP TO 12 PUFFS PER DAY (Patient taking differently: Inhale 2 puffs into the lungs two times daily. INHALE 2 PUFFS BY MOUTH TWICE DAILY PLUS 1-2 PUFFS AS NEEDED MAY USE UP TO 12 PUFFS PER DAY), Disp: 20.4 g, Rfl: 11    hydrochlorothiazide (HYDRODIURIL) 25 MG tablet, TAKE 1 TABLET(25 MG) BY MOUTH DAILY, Disp: 90 tablet, Rfl: 0    montelukast (SINGULAIR) 10 MG tablet, Take 1 tablet (10 mg) by mouth at bedtime, Disp: 90 tablet, Rfl: 3    nortriptyline (PAMELOR) 10 MG capsule, Take 1 capsule (10 mg) by mouth at bedtime, Disp: 91 capsule, Rfl: 3    omeprazole (PRILOSEC) 20 MG DR capsule, Take 1 capsule (20 mg) by mouth 2 times daily, Disp: 180 capsule, Rfl: 3    VITAMIN D OR, 1 tablet daily, Disp: , Rfl:     albuterol (PROAIR HFA/PROVENTIL HFA/VENTOLIN HFA) 108 (90 Base) MCG/ACT inhaler, Inhale 1-2 puffs into the lungs every 6 hours as needed for shortness of breath or wheezing  (Patient not taking: Reported on 6/24/2025), Disp: 18 g, Rfl: 1    predniSONE (DELTASONE) 20 MG tablet, Take 2 tabs daily x 5 days, then 1 tab daily x 5 days (Patient not taking: Reported on 3/31/2025), Disp: 15 tablet, Rfl: 0    predniSONE (DELTASONE) 20 MG tablet, Take 2 tabs daily x 5 days (Patient not taking: Reported on 3/31/2025), Disp: 10 tablet, Rfl: 0  Allergies   Allergen Reactions    No Known Drug Allergy     Seasonal Allergies          EXAM:   /86 (BP Location: Left arm, Patient Position: Sitting, Cuff Size: Adult Large)   Pulse 75   Wt 114.4 kg (252 lb 4.8 oz)   SpO2 98%   BMI 30.73 kg/m      Constitutional:       General: He is not in acute distress.     Appearance: Normal appearance. He is not ill-appearing.   HENT:      Head: Normocephalic and atraumatic.      Nose: Nose normal. No congestion or rhinorrhea.      Mouth/Throat:      Mouth: Mucous membranes are moist.      Pharynx: Oropharynx is clear. No posterior oropharyngeal erythema.   Ear canals are clear bilaterally with no evidence of erythema of the eardrums  Eyes:      General:         Right eye: No discharge.         Left eye: No discharge.   Cardiovascular:      Rate and Rhythm: Normal rate and regular rhythm.      Heart sounds: Normal heart sounds.   Pulmonary:      Effort: Pulmonary effort is normal.      Breath sounds: Normal breath sounds. No wheezing or rhonchi.   Skin:     General: Skin is warm.      Findings: No erythema or rash.   Neurological:      General: No focal deficit present.      Mental Status: He is alert. Mental status is at baseline.   Psychiatric:         Mood and Affect: Mood normal.         Behavior: Behavior normal.        ASSESSMENT/PLAN:  Fito Frye is a 54 year old male seen today for ongoing evaluation of allergic rhinitis as well as asthma.  He prefers Symbicort over Trelegy.  He is having mild hearing changes.    Stop Singulair, message with any symptom changes   Allegra/fexofenadine 180 mg once  daily and Flonase 2 sprays each nostril daily x 7 days.  Continue Breyna   Continue Omeprazole   ENT/audiology evaluation if hearing does not improve    Follow-up in 6 months      Thank you for allowing me to participate in the care of Fito Frye.      I spent 35 minutes on the date of the encounter doing chart review, history and exam, documentation and further coordination as noted above exclusive of separately reported interpretations    Domingo Ny MD  Allergy/Immunology  [facility]

## 2025-06-24 NOTE — PATIENT INSTRUCTIONS
Stop Singulair, message with any symptoms changes   Allegra/fexofenadine 180 mg once daily and Flonase 2 sprays each nostril daily x 7 days.  Continue Breyna   Continue Omeprazole   ENT/audiology evaluation if hearing does not improve      Allergy Staff Appt Hours Shot Hours Location       Physician   Domingo Ny MD      Support Staff   REYES Mckinley RN, OVIDIO NICOLE MA      Mondays Tuesdays Thursdays and Fridays:      [place] 7-5 Wednesdays         Close                Mondays, Tuesdays and Fridays:  7:20 - 3:40              [facility]  [address]  [place] [address]  Allergy appointment  line: [phone]    Pulmonary Function Scheduling:  [place]: [phone]           Questions about cost of your care  For questions about your cost of your visit, procedure, lab or imaging contact: [employer] Price Line [phone] or visit:  [email or web address]    Prescription Assistance  If you need assistance with your prescriptions (cost, coverage, etc) please contact: [employer] Prescription Assistance Program [phone]    Important Scheduling Information  All visits for food challenges, medication/drug allergy testing, and drug challenges MUST be scheduled through the allergy clinic nurse. Please contact them via [employer] or by calling the clinic at [phone] and asking to speak with an allergy nurse. They will provide additional information and instructions for the appointment. Discontinue oral antihistamines 7 days prior to the appointment. Discontinue nasal and ocular antihistamines 1 day prior to the appointment.    Appointments for skin testing: Appointment will last approximately 45 minutes.  Please call the appointment line for your clinic to schedule.  Discontinue oral antihistamines 7 days prior to the appointment.  Discontinue nasal and ocular antihistamines 1 days prior to  appointment.    Thank you for trusting us with your care. Please feel free to contact us with any questions or concerns you may have.

## 2025-06-24 NOTE — LETTER
6/24/2025      Fito Frye  [address]  [facility] [address]      Dear Colleague,    Thank you for referring your patient, Fito Frye, to the [facility] [place]. Please see a copy of my visit note below.    Fito Frye was seen in the Allergy Clinic at [facility].    Fito Frye is a 54 year old male being seen today for ongoing evaluation of seasonal allergic rhinitis, with a history of asthma and reflux.      He was given prednisone in November as well as January for asthma symptoms which completely resolved his symptoms.  He does continue to take Singulair and is uncertain if it provides any benefit.  Trelegy was prescribed at the last appointment however he did not find this to provide any additional benefit compared to Breyna.  He would like to continue with Breyna and has not used Trelegy in the last 1 to 2 months.    He does use the Breyna 2 puffs twice daily and occasionally will use additional doses as prescribed.  Sometimes this will be up to 3 times a week.      Symptoms today include mild chest congestion as well as his ears feel clogged and he feels like his decreased hearing.    PAST ALLERGY HISTORY (11/2024):  Pulmonary function testing showed an FEV1 of 80% of predicted and FVC of 76% of predicted.    He has seen pulmonology on October 7, 2024.    He saw cardiology (2022), for shortness of breath which was determined to be non-cardiac cause (stress echo, EKG, and CTA).      He was referred by Pulmonology due to asthma and seasonal allergies.     A chest CT scan was normal.    He did have blood test in 2022 that were positive to dust mites, grass, weeds and trees and minimally to cat and dog.      Past Medical History:   Diagnosis Date     Arthritis      Depressive disorder      Heart disease      Hypertension      NO ACTIVE PROBLEMS      Uncomplicated asthma      Family History   Problem Relation Age of Onset     Myocardial  Infarction Mother 70         of MI     Breast Cancer Mother 50        60s or 50s     Coronary Artery Disease Father 65        in his 60s     Diabetes Type 2  Father      Sleep Apnea Father         diagnosed in mid 70's     Multiple myeloma Father 78     No Known Problems Sister      Myocardial Infarction Brother 55        no blockages     Prostate Cancer Maternal Grandfather          in his 60s of prostate cancer     Depression Daughter      No Known Problems Daughter      No Known Problems Daughter      No Known Problems Son      No Known Problems Son      Prostate Cancer Maternal Uncle 72         age 72     Prostate Cancer Maternal Uncle 75        doing well     Past Surgical History:   Procedure Laterality Date     [facility] ECHO HEART XTHORACIC, STRESS/REST  2007    Negative         Current Outpatient Medications:      Acetaminophen (TYLENOL PO), Take by mouth as needed., Disp: , Rfl:      amLODIPine (NORVASC) 5 MG tablet, Take 1 tablet (5 mg) by mouth daily., Disp: 90 tablet, Rfl: 1     aspirin (ASA) 81 MG EC tablet, Take 1 tablet (81 mg) by mouth daily, Disp: 90 tablet, Rfl: 3     atorvastatin (LIPITOR) 40 MG tablet, Take 1 tablet (40 mg) by mouth daily, Disp: 90 tablet, Rfl: 3     budesonide-formoterol (BREYNA) 160-4.5 MCG/ACT Inhaler, INHALE 2 PUFFS BY MOUTH TWICE DAILY PLUS 1-2 PUFFS AS NEEDED MAY USE UP TO 12 PUFFS PER DAY (Patient taking differently: Inhale 2 puffs into the lungs two times daily. INHALE 2 PUFFS BY MOUTH TWICE DAILY PLUS 1-2 PUFFS AS NEEDED MAY USE UP TO 12 PUFFS PER DAY), Disp: 20.4 g, Rfl: 11     hydrochlorothiazide (HYDRODIURIL) 25 MG tablet, TAKE 1 TABLET(25 MG) BY MOUTH DAILY, Disp: 90 tablet, Rfl: 0     montelukast (SINGULAIR) 10 MG tablet, Take 1 tablet (10 mg) by mouth at bedtime, Disp: 90 tablet, Rfl: 3     nortriptyline (PAMELOR) 10 MG capsule, Take 1 capsule (10 mg) by mouth at bedtime, Disp: 91 capsule, Rfl: 3     omeprazole (PRILOSEC) 20 MG DR capsule, Take 1 capsule  (20 mg) by mouth 2 times daily, Disp: 180 capsule, Rfl: 3     VITAMIN D OR, 1 tablet daily, Disp: , Rfl:      albuterol (PROAIR HFA/PROVENTIL HFA/VENTOLIN HFA) 108 (90 Base) MCG/ACT inhaler, Inhale 1-2 puffs into the lungs every 6 hours as needed for shortness of breath or wheezing (Patient not taking: Reported on 6/24/2025), Disp: 18 g, Rfl: 1     predniSONE (DELTASONE) 20 MG tablet, Take 2 tabs daily x 5 days, then 1 tab daily x 5 days (Patient not taking: Reported on 3/31/2025), Disp: 15 tablet, Rfl: 0     predniSONE (DELTASONE) 20 MG tablet, Take 2 tabs daily x 5 days (Patient not taking: Reported on 3/31/2025), Disp: 10 tablet, Rfl: 0  Allergies   Allergen Reactions     No Known Drug Allergy      Seasonal Allergies          EXAM:   /86 (BP Location: Left arm, Patient Position: Sitting, Cuff Size: Adult Large)   Pulse 75   Wt 114.4 kg (252 lb 4.8 oz)   SpO2 98%   BMI 30.73 kg/m      Constitutional:       General: He is not in acute distress.     Appearance: Normal appearance. He is not ill-appearing.   HENT:      Head: Normocephalic and atraumatic.      Nose: Nose normal. No congestion or rhinorrhea.      Mouth/Throat:      Mouth: Mucous membranes are moist.      Pharynx: Oropharynx is clear. No posterior oropharyngeal erythema.   Ear canals are clear bilaterally with no evidence of erythema of the eardrums  Eyes:      General:         Right eye: No discharge.         Left eye: No discharge.   Cardiovascular:      Rate and Rhythm: Normal rate and regular rhythm.      Heart sounds: Normal heart sounds.   Pulmonary:      Effort: Pulmonary effort is normal.      Breath sounds: Normal breath sounds. No wheezing or rhonchi.   Skin:     General: Skin is warm.      Findings: No erythema or rash.   Neurological:      General: No focal deficit present.      Mental Status: He is alert. Mental status is at baseline.   Psychiatric:         Mood and Affect: Mood normal.         Behavior: Behavior normal.         ASSESSMENT/PLAN:  Fito Frye is a 54 year old male seen today for ongoing evaluation of allergic rhinitis as well as asthma.  He prefers Symbicort over Trelegy.  He is having mild hearing changes.    Stop Singulair, message with any symptom changes   Allegra/fexofenadine 180 mg once daily and Flonase 2 sprays each nostril daily x 7 days.  Continue Breyna   Continue Omeprazole   ENT/audiology evaluation if hearing does not improve    Follow-up in 6 months      Thank you for allowing me to participate in the care of Fito Frye.      I spent 35 minutes on the date of the encounter doing chart review, history and exam, documentation and further coordination as noted above exclusive of separately reported interpretations    Domingo Ny MD  Allergy/Immunology  [facility]      Again, thank you for allowing me to participate in the care of your patient.        Sincerely,        Domingo Ny MD    Electronically signed

## 2025-07-07 ENCOUNTER — TELEPHONE (OUTPATIENT)
Dept: PULMONOLOGY | Facility: CLINIC | Age: 55
End: 2025-07-07
Payer: COMMERCIAL

## 2025-07-07 NOTE — TELEPHONE ENCOUNTER
Patient Contacted for the patient to call back and schedule the following:    Appointment type:RS RTA  Provider: Flor  Return date: 10/7/2025 to next avail  Specialty phone number: [phone]  Additional appointment(s) needed: na  Additonal Notes: provider unavailable

## 2025-07-15 DIAGNOSIS — J45.50 SEVERE PERSISTENT ASTHMA WITHOUT COMPLICATION (H): ICD-10-CM

## 2025-07-15 DIAGNOSIS — I25.10 CORONARY ARTERY DISEASE INVOLVING NATIVE CORONARY ARTERY OF NATIVE HEART WITHOUT ANGINA PECTORIS: ICD-10-CM

## 2025-07-15 DIAGNOSIS — E78.5 HYPERLIPIDEMIA LDL GOAL <70: ICD-10-CM

## 2025-07-15 DIAGNOSIS — J30.81 CAT ALLERGIES: ICD-10-CM

## 2025-07-15 RX ORDER — ATORVASTATIN CALCIUM 40 MG/1
40 TABLET, FILM COATED ORAL DAILY
Qty: 30 TABLET | Refills: 0 | Status: SHIPPED | OUTPATIENT
Start: 2025-07-15

## 2025-07-15 RX ORDER — ATORVASTATIN CALCIUM 40 MG/1
40 TABLET, FILM COATED ORAL DAILY
Qty: 90 TABLET | OUTPATIENT
Start: 2025-07-15

## 2025-07-16 RX ORDER — MONTELUKAST SODIUM 10 MG/1
1 TABLET ORAL AT BEDTIME
Qty: 90 TABLET | Refills: 0 | Status: SHIPPED | OUTPATIENT
Start: 2025-07-16

## 2025-07-16 NOTE — TELEPHONE ENCOUNTER
Patient is due for an appointment.   I sent a gala refill for 90 days.   Reception: Please call to schedule patient for an appointment.   Thank you!!   AH

## 2025-09-01 SDOH — HEALTH STABILITY: PHYSICAL HEALTH: ON AVERAGE, HOW MANY DAYS PER WEEK DO YOU ENGAGE IN MODERATE TO STRENUOUS EXERCISE (LIKE A BRISK WALK)?: 5 DAYS

## 2025-09-01 SDOH — HEALTH STABILITY: PHYSICAL HEALTH: ON AVERAGE, HOW MANY MINUTES DO YOU ENGAGE IN EXERCISE AT THIS LEVEL?: 40 MIN

## 2025-09-01 ASSESSMENT — ASTHMA QUESTIONNAIRES
QUESTION_4 LAST FOUR WEEKS HOW OFTEN HAVE YOU USED YOUR RESCUE INHALER OR NEBULIZER MEDICATION (SUCH AS ALBUTEROL): TWO OR THREE TIMES PER WEEK
ACT_TOTALSCORE: 13
QUESTION_3 LAST FOUR WEEKS HOW OFTEN DID YOUR ASTHMA SYMPTOMS (WHEEZING, COUGHING, SHORTNESS OF BREATH, CHEST TIGHTNESS OR PAIN) WAKE YOU UP AT NIGHT OR EARLIER THAN USUAL IN THE MORNING: TWO OR THREE NIGHTS A WEEK
QUESTION_2 LAST FOUR WEEKS HOW OFTEN HAVE YOU HAD SHORTNESS OF BREATH: ONCE A DAY
QUESTION_5 LAST FOUR WEEKS HOW WOULD YOU RATE YOUR ASTHMA CONTROL: SOMEWHAT CONTROLLED
QUESTION_1 LAST FOUR WEEKS HOW MUCH OF THE TIME DID YOUR ASTHMA KEEP YOU FROM GETTING AS MUCH DONE AT WORK, SCHOOL OR AT HOME: SOME OF THE TIME

## 2025-09-02 ENCOUNTER — OFFICE VISIT (OUTPATIENT)
Dept: FAMILY MEDICINE | Facility: CLINIC | Age: 55
End: 2025-09-02
Attending: FAMILY MEDICINE
Payer: COMMERCIAL

## 2025-09-02 VITALS
DIASTOLIC BLOOD PRESSURE: 84 MMHG | HEART RATE: 78 BPM | WEIGHT: 253.3 LBS | SYSTOLIC BLOOD PRESSURE: 128 MMHG | OXYGEN SATURATION: 98 % | BODY MASS INDEX: 30.84 KG/M2 | RESPIRATION RATE: 16 BRPM | TEMPERATURE: 97.2 F | HEIGHT: 76 IN

## 2025-09-02 DIAGNOSIS — Z12.5 SCREENING FOR PROSTATE CANCER: ICD-10-CM

## 2025-09-02 DIAGNOSIS — E78.5 HYPERLIPIDEMIA LDL GOAL <70: ICD-10-CM

## 2025-09-02 DIAGNOSIS — K21.00 GASTROESOPHAGEAL REFLUX DISEASE WITH ESOPHAGITIS WITHOUT HEMORRHAGE: ICD-10-CM

## 2025-09-02 DIAGNOSIS — G44.209 TENSION HEADACHE: ICD-10-CM

## 2025-09-02 DIAGNOSIS — I10 BENIGN ESSENTIAL HYPERTENSION: ICD-10-CM

## 2025-09-02 DIAGNOSIS — J30.81 CAT ALLERGIES: ICD-10-CM

## 2025-09-02 DIAGNOSIS — R14.0 BLOATING: ICD-10-CM

## 2025-09-02 DIAGNOSIS — Z00.00 ROUTINE GENERAL MEDICAL EXAMINATION AT A HEALTH CARE FACILITY: Primary | ICD-10-CM

## 2025-09-02 DIAGNOSIS — I25.10 CORONARY ARTERY DISEASE INVOLVING NATIVE CORONARY ARTERY OF NATIVE HEART WITHOUT ANGINA PECTORIS: ICD-10-CM

## 2025-09-02 DIAGNOSIS — Z12.83 SKIN CANCER SCREENING: ICD-10-CM

## 2025-09-02 DIAGNOSIS — J45.50 SEVERE PERSISTENT ASTHMA WITHOUT COMPLICATION (H): ICD-10-CM

## 2025-09-02 DIAGNOSIS — H91.92 HEARING DIFFICULTY OF LEFT EAR: ICD-10-CM

## 2025-09-02 LAB
ALBUMIN SERPL BCG-MCNC: 4.5 G/DL (ref 3.5–5.2)
ALP SERPL-CCNC: 94 U/L (ref 40–150)
ALT SERPL W P-5'-P-CCNC: 30 U/L (ref 0–70)
ANION GAP SERPL CALCULATED.3IONS-SCNC: 10 MMOL/L (ref 7–15)
AST SERPL W P-5'-P-CCNC: 29 U/L (ref 0–45)
BILIRUB SERPL-MCNC: 0.4 MG/DL
BUN SERPL-MCNC: 17.4 MG/DL (ref 6–20)
CALCIUM SERPL-MCNC: 10 MG/DL (ref 8.8–10.4)
CHLORIDE SERPL-SCNC: 101 MMOL/L (ref 98–107)
CHOLEST SERPL-MCNC: 230 MG/DL
CREAT SERPL-MCNC: 1 MG/DL (ref 0.67–1.17)
EGFRCR SERPLBLD CKD-EPI 2021: 88 ML/MIN/1.73M2
FASTING STATUS PATIENT QL REPORTED: NO
FASTING STATUS PATIENT QL REPORTED: NO
GLUCOSE SERPL-MCNC: 83 MG/DL (ref 70–99)
HCO3 SERPL-SCNC: 27 MMOL/L (ref 22–29)
HDLC SERPL-MCNC: 59 MG/DL
LDLC SERPL CALC-MCNC: 151 MG/DL
NONHDLC SERPL-MCNC: 171 MG/DL
POTASSIUM SERPL-SCNC: 4.1 MMOL/L (ref 3.4–5.3)
PROT SERPL-MCNC: 7.7 G/DL (ref 6.4–8.3)
PSA SERPL DL<=0.01 NG/ML-MCNC: 0.83 NG/ML (ref 0–3.5)
SODIUM SERPL-SCNC: 138 MMOL/L (ref 135–145)
TRIGL SERPL-MCNC: 99 MG/DL

## 2025-09-02 PROCEDURE — 36415 COLL VENOUS BLD VENIPUNCTURE: CPT | Performed by: FAMILY MEDICINE

## 2025-09-02 PROCEDURE — 80053 COMPREHEN METABOLIC PANEL: CPT | Performed by: FAMILY MEDICINE

## 2025-09-02 PROCEDURE — G0103 PSA SCREENING: HCPCS | Performed by: FAMILY MEDICINE

## 2025-09-02 PROCEDURE — 80061 LIPID PANEL: CPT | Performed by: FAMILY MEDICINE

## 2025-09-02 RX ORDER — OMEPRAZOLE 20 MG/1
20 CAPSULE, DELAYED RELEASE ORAL 2 TIMES DAILY
Qty: 180 CAPSULE | Refills: 3 | Status: SHIPPED | OUTPATIENT
Start: 2025-09-02

## 2025-09-02 RX ORDER — MONTELUKAST SODIUM 10 MG/1
1 TABLET ORAL AT BEDTIME
Qty: 90 TABLET | Refills: 3 | Status: SHIPPED | OUTPATIENT
Start: 2025-09-02

## 2025-09-02 RX ORDER — NORTRIPTYLINE HYDROCHLORIDE 10 MG/1
10 CAPSULE ORAL AT BEDTIME
Qty: 91 CAPSULE | Refills: 3 | Status: SHIPPED | OUTPATIENT
Start: 2025-09-02

## 2025-09-02 RX ORDER — HYDROCHLOROTHIAZIDE 25 MG/1
25 TABLET ORAL DAILY
Qty: 90 TABLET | Refills: 3 | Status: SHIPPED | OUTPATIENT
Start: 2025-09-02

## 2025-09-02 RX ORDER — BUDESONIDE AND FORMOTEROL FUMARATE DIHYDRATE 160; 4.5 UG/1; UG/1
2 AEROSOL RESPIRATORY (INHALATION)
Qty: 30.6 G | Refills: 11 | Status: SHIPPED | OUTPATIENT
Start: 2025-09-02

## 2025-09-02 RX ORDER — ATORVASTATIN CALCIUM 40 MG/1
40 TABLET, FILM COATED ORAL DAILY
Qty: 90 TABLET | Refills: 3 | Status: SHIPPED | OUTPATIENT
Start: 2025-09-02

## 2025-09-02 RX ORDER — AMLODIPINE BESYLATE 5 MG/1
5 TABLET ORAL DAILY
Qty: 90 TABLET | Refills: 3 | Status: SHIPPED | OUTPATIENT
Start: 2025-09-02

## 2025-09-02 ASSESSMENT — PAIN SCALES - GENERAL: PAINLEVEL_OUTOF10: NO PAIN (0)

## 2025-09-03 ENCOUNTER — PATIENT OUTREACH (OUTPATIENT)
Dept: CARE COORDINATION | Facility: CLINIC | Age: 55
End: 2025-09-03
Payer: COMMERCIAL

## (undated) RX ORDER — IVABRADINE 5 MG/1
TABLET, FILM COATED ORAL
Status: DISPENSED
Start: 2022-09-15

## (undated) RX ORDER — METOPROLOL TARTRATE 50 MG
TABLET ORAL
Status: DISPENSED
Start: 2022-09-15

## (undated) RX ORDER — NITROGLYCERIN 0.4 MG/1
TABLET SUBLINGUAL
Status: DISPENSED
Start: 2022-09-15

## (undated) RX ORDER — ALBUTEROL SULFATE 0.83 MG/ML
SOLUTION RESPIRATORY (INHALATION)
Status: DISPENSED
Start: 2022-10-12